# Patient Record
Sex: FEMALE | Race: OTHER | HISPANIC OR LATINO
[De-identification: names, ages, dates, MRNs, and addresses within clinical notes are randomized per-mention and may not be internally consistent; named-entity substitution may affect disease eponyms.]

---

## 2017-01-03 ENCOUNTER — APPOINTMENT (OUTPATIENT)
Dept: OBGYN | Facility: CLINIC | Age: 27
End: 2017-01-03

## 2017-01-05 ENCOUNTER — FORM ENCOUNTER (OUTPATIENT)
Age: 27
End: 2017-01-05

## 2017-01-06 ENCOUNTER — APPOINTMENT (OUTPATIENT)
Dept: ULTRASOUND IMAGING | Facility: IMAGING CENTER | Age: 27
End: 2017-01-06

## 2017-01-06 ENCOUNTER — OUTPATIENT (OUTPATIENT)
Dept: OUTPATIENT SERVICES | Facility: HOSPITAL | Age: 27
LOS: 1 days | End: 2017-01-06
Payer: MEDICAID

## 2017-01-06 DIAGNOSIS — E04.1 NONTOXIC SINGLE THYROID NODULE: ICD-10-CM

## 2017-01-06 DIAGNOSIS — Z33.2 ENCOUNTER FOR ELECTIVE TERMINATION OF PREGNANCY: Chronic | ICD-10-CM

## 2017-01-06 DIAGNOSIS — Z98.89 OTHER SPECIFIED POSTPROCEDURAL STATES: Chronic | ICD-10-CM

## 2017-01-06 PROCEDURE — 76536 US EXAM OF HEAD AND NECK: CPT

## 2017-01-10 ENCOUNTER — APPOINTMENT (OUTPATIENT)
Dept: OBGYN | Facility: CLINIC | Age: 27
End: 2017-01-10

## 2017-01-10 ENCOUNTER — OUTPATIENT (OUTPATIENT)
Dept: OUTPATIENT SERVICES | Facility: HOSPITAL | Age: 27
LOS: 1 days | End: 2017-01-10
Payer: MEDICAID

## 2017-01-10 ENCOUNTER — RESULT CHARGE (OUTPATIENT)
Age: 27
End: 2017-01-10

## 2017-01-10 VITALS
HEIGHT: 67 IN | SYSTOLIC BLOOD PRESSURE: 120 MMHG | BODY MASS INDEX: 30.29 KG/M2 | WEIGHT: 193 LBS | DIASTOLIC BLOOD PRESSURE: 82 MMHG

## 2017-01-10 DIAGNOSIS — N39.0 URINARY TRACT INFECTION, SITE NOT SPECIFIED: ICD-10-CM

## 2017-01-10 DIAGNOSIS — N76.0 ACUTE VAGINITIS: ICD-10-CM

## 2017-01-10 DIAGNOSIS — Z98.89 OTHER SPECIFIED POSTPROCEDURAL STATES: Chronic | ICD-10-CM

## 2017-01-10 DIAGNOSIS — Z33.2 ENCOUNTER FOR ELECTIVE TERMINATION OF PREGNANCY: Chronic | ICD-10-CM

## 2017-01-10 LAB
HCG UR QL: NEGATIVE
QUALITY CONTROL: NORMAL

## 2017-01-10 PROCEDURE — G0463: CPT

## 2017-01-10 PROCEDURE — 81025 URINE PREGNANCY TEST: CPT

## 2017-01-11 DIAGNOSIS — R53.83 OTHER FATIGUE: ICD-10-CM

## 2017-02-28 LAB
CORTIS SERPL-MCNC: 9.5 UG/DL
T4 FREE SERPL-MCNC: 1 NG/DL
TSH SERPL-ACNC: 5.52 UIU/ML

## 2017-03-05 ENCOUNTER — EMERGENCY (EMERGENCY)
Facility: HOSPITAL | Age: 27
LOS: 1 days | Discharge: HOME | End: 2017-03-05
Attending: EMERGENCY MEDICINE | Admitting: EMERGENCY MEDICINE
Payer: MEDICAID

## 2017-03-05 VITALS
OXYGEN SATURATION: 98 % | SYSTOLIC BLOOD PRESSURE: 127 MMHG | HEART RATE: 85 BPM | DIASTOLIC BLOOD PRESSURE: 78 MMHG | RESPIRATION RATE: 17 BRPM | TEMPERATURE: 98 F

## 2017-03-05 VITALS
RESPIRATION RATE: 17 BRPM | HEART RATE: 106 BPM | SYSTOLIC BLOOD PRESSURE: 136 MMHG | TEMPERATURE: 98 F | DIASTOLIC BLOOD PRESSURE: 83 MMHG | OXYGEN SATURATION: 98 %

## 2017-03-05 DIAGNOSIS — Z98.89 OTHER SPECIFIED POSTPROCEDURAL STATES: Chronic | ICD-10-CM

## 2017-03-05 DIAGNOSIS — Z33.2 ENCOUNTER FOR ELECTIVE TERMINATION OF PREGNANCY: Chronic | ICD-10-CM

## 2017-03-05 LAB
ALBUMIN SERPL ELPH-MCNC: 4.3 G/DL — SIGNIFICANT CHANGE UP (ref 3.3–5)
ALP SERPL-CCNC: 81 U/L — SIGNIFICANT CHANGE UP (ref 40–120)
ALT FLD-CCNC: 14 U/L RC — SIGNIFICANT CHANGE UP (ref 10–45)
ANION GAP SERPL CALC-SCNC: 11 MMOL/L — SIGNIFICANT CHANGE UP (ref 5–17)
APTT BLD: 28.4 SEC — SIGNIFICANT CHANGE UP (ref 27.5–37.4)
AST SERPL-CCNC: 18 U/L — SIGNIFICANT CHANGE UP (ref 10–40)
BASOPHILS # BLD AUTO: 0 K/UL — SIGNIFICANT CHANGE UP (ref 0–0.2)
BILIRUB SERPL-MCNC: 0.5 MG/DL — SIGNIFICANT CHANGE UP (ref 0.2–1.2)
BUN SERPL-MCNC: 15 MG/DL — SIGNIFICANT CHANGE UP (ref 7–23)
CALCIUM SERPL-MCNC: 8.6 MG/DL — SIGNIFICANT CHANGE UP (ref 8.4–10.5)
CHLORIDE SERPL-SCNC: 104 MMOL/L — SIGNIFICANT CHANGE UP (ref 96–108)
CO2 SERPL-SCNC: 23 MMOL/L — SIGNIFICANT CHANGE UP (ref 22–31)
CREAT SERPL-MCNC: 0.56 MG/DL — SIGNIFICANT CHANGE UP (ref 0.5–1.3)
EOSINOPHIL # BLD AUTO: 0.1 K/UL — SIGNIFICANT CHANGE UP (ref 0–0.5)
EOSINOPHIL NFR BLD AUTO: 2 % — SIGNIFICANT CHANGE UP (ref 0–6)
GLUCOSE SERPL-MCNC: 105 MG/DL — HIGH (ref 70–99)
HCT VFR BLD CALC: 38.1 % — SIGNIFICANT CHANGE UP (ref 34.5–45)
HGB BLD-MCNC: 12.8 G/DL — SIGNIFICANT CHANGE UP (ref 11.5–15.5)
INR BLD: 1.17 RATIO — HIGH (ref 0.88–1.16)
LYMPHOCYTES # BLD AUTO: 1.3 K/UL — SIGNIFICANT CHANGE UP (ref 1–3.3)
LYMPHOCYTES # BLD AUTO: 38 % — SIGNIFICANT CHANGE UP (ref 13–44)
MCHC RBC-ENTMCNC: 29.2 PG — SIGNIFICANT CHANGE UP (ref 27–34)
MCHC RBC-ENTMCNC: 33.5 GM/DL — SIGNIFICANT CHANGE UP (ref 32–36)
MCV RBC AUTO: 87.3 FL — SIGNIFICANT CHANGE UP (ref 80–100)
MONOCYTES # BLD AUTO: 0.4 K/UL — SIGNIFICANT CHANGE UP (ref 0–0.9)
MONOCYTES NFR BLD AUTO: 5 % — SIGNIFICANT CHANGE UP (ref 2–14)
NEUTROPHILS # BLD AUTO: 1.9 K/UL — SIGNIFICANT CHANGE UP (ref 1.8–7.4)
NEUTROPHILS NFR BLD AUTO: 44 % — SIGNIFICANT CHANGE UP (ref 43–77)
PLATELET # BLD AUTO: 301 K/UL — SIGNIFICANT CHANGE UP (ref 150–400)
POTASSIUM SERPL-MCNC: 4.1 MMOL/L — SIGNIFICANT CHANGE UP (ref 3.5–5.3)
POTASSIUM SERPL-SCNC: 4.1 MMOL/L — SIGNIFICANT CHANGE UP (ref 3.5–5.3)
PROT SERPL-MCNC: 7.9 G/DL — SIGNIFICANT CHANGE UP (ref 6–8.3)
PROTHROM AB SERPL-ACNC: 12.7 SEC — SIGNIFICANT CHANGE UP (ref 10–13.1)
RBC # BLD: 4.37 M/UL — SIGNIFICANT CHANGE UP (ref 3.8–5.2)
RBC # FLD: 14.1 % — SIGNIFICANT CHANGE UP (ref 10.3–14.5)
SODIUM SERPL-SCNC: 138 MMOL/L — SIGNIFICANT CHANGE UP (ref 135–145)
WBC # BLD: 3.5 K/UL — LOW (ref 3.8–10.5)
WBC # FLD AUTO: 3.5 K/UL — LOW (ref 3.8–10.5)

## 2017-03-05 PROCEDURE — 96374 THER/PROPH/DIAG INJ IV PUSH: CPT

## 2017-03-05 PROCEDURE — 80053 COMPREHEN METABOLIC PANEL: CPT

## 2017-03-05 PROCEDURE — 73110 X-RAY EXAM OF WRIST: CPT | Mod: 26,50

## 2017-03-05 PROCEDURE — 99284 EMERGENCY DEPT VISIT MOD MDM: CPT

## 2017-03-05 PROCEDURE — 85610 PROTHROMBIN TIME: CPT

## 2017-03-05 PROCEDURE — 73610 X-RAY EXAM OF ANKLE: CPT | Mod: 26,50

## 2017-03-05 PROCEDURE — 86140 C-REACTIVE PROTEIN: CPT

## 2017-03-05 PROCEDURE — 73610 X-RAY EXAM OF ANKLE: CPT

## 2017-03-05 PROCEDURE — 73110 X-RAY EXAM OF WRIST: CPT

## 2017-03-05 PROCEDURE — 73120 X-RAY EXAM OF HAND: CPT

## 2017-03-05 PROCEDURE — 85730 THROMBOPLASTIN TIME PARTIAL: CPT

## 2017-03-05 PROCEDURE — 85652 RBC SED RATE AUTOMATED: CPT

## 2017-03-05 PROCEDURE — 73120 X-RAY EXAM OF HAND: CPT | Mod: 26,50

## 2017-03-05 PROCEDURE — 85027 COMPLETE CBC AUTOMATED: CPT

## 2017-03-05 PROCEDURE — 99284 EMERGENCY DEPT VISIT MOD MDM: CPT | Mod: 25

## 2017-03-05 RX ADMIN — Medication 125 MILLIGRAM(S): at 12:04

## 2017-03-05 NOTE — ED PROVIDER NOTE - OBJECTIVE STATEMENT
27 year old, with pmh emphysema, seronegative arthitis, presenting with swollen lymph nodes, swollen right hand worst in her last 3 digits compromising her ability to  and hold things. new bruising on her thighs, swollen feet. flares similar have happened since age 19, usually gets prednisone 60mg and then tapers. currently not on steroids. symptoms refractory to ibu and tylenol. Has tried methotrexate 1.5 years ago but she developed fatty liver disease. this is her worst "flare" in 1.5 years ago. diagnosed with thyroid nodules 3 months ago with no acute interventions yet.     PMD: kaykay bustamante  Rheum: in between doctors  endocrinolgist: gayla orozco 27 year old, with pmh emphysema, seronegative arthitis, presenting with swollen lymph nodes, swollen right hand worst in her last 3 digits compromising her ability to  and hold things. new bruising on her thighs, swollen feet. flares similar have happened since age 19, usually gets prednisone 60mg and then tapers. currently not on steroids. symptoms refractory to ibu and tylenol. Has tried methotrexate 1.5 years ago but she developed fatty liver disease. this is her worst "flare" in 1.5 years ago. diagnosed with thyroid nodules 3 months ago with no acute interventions yet.   denies recent travel or fevers.   PMD: kaykay bustamante  Rheum: in between doctors  endocrinolgist: gayla orozco

## 2017-03-05 NOTE — ED PROVIDER NOTE - ATTENDING CONTRIBUTION TO CARE
Patient with chronic arthritis of unclear etiology (patient reporting that workups have not been diagnostic to date) presenting with exacerbation of her chronic arthritis - having problems moving or using her hands.  Has not seen her rheumatologist recently and is getting frustrated that her symptoms are only getting worse without a known cause.  Does report that her symptoms have responded to steroids in the past but currently not on them.    On exam patient VS WNL, well appearing. RRR S1/S2, CTABL, abdomen soft.  Diffuse polyarthritis noted without erythema/warmth noted.  Small old bruising on inner thighs - patient reporting this is new.    Suspect exacerbation of chronic arthritis of unclear cause.  Brusing on thighs appears more suggestive of a possible vasculitis - unclear if this is associated with the underlying arthritis/rheumatologic disease.  Will check basic labs for platelet/clotting deficiency at this time, will also treat with steroids in ED for symptom management.  Had lengthy discussion with patient - will refer to rheumatology and pain management for re-evaluate and second opinion of her chronic issue as ED workup unlikely to be diagnostic at this time - she agreed with plan.

## 2017-03-05 NOTE — ED PROVIDER NOTE - CARE PLAN
Principal Discharge DX:	Joint swelling  Instructions for follow-up, activity and diet:	take 20mg Prednisone for 2 weeks  You may take 600mg of ibuprofen (example: motrin or advil) every 4-6 hours for baseline pain control as indicated with respect to the warnings on the label. This is an over the counter medication.   Follow up with [facility] Rheumatology clinic within 1 week   You must return for new, worsening or concerning symptom; specifically including those listed on the attached sheet. Principal Discharge DX:	Joint swelling  Instructions for follow-up, activity and diet:	take 20mg Prednisone for 2 weeks  You may take 600mg of ibuprofen (example: motrin or advil) every 4-6 hours for baseline pain control as indicated with respect to the warnings on the label. This is an over the counter medication.   Follow up with [facility] Rheumatology clinic within 1 week   You must return for new, worsening or concerning symptom; specifically including those listed on the attached sheet.

## 2017-03-05 NOTE — ED PROVIDER NOTE - PMH
Anemia    Asthma  No h/o intubation, dose not use ventolin on daily basis, denies hospitalization due to asthma  Chronic tonsillitis    Emphysema lung    Enlarged lymph nodes  dx: 2014  History of Clostridium difficile infection  2016, not an active infection  Migraine    Multigravida    Seronegative arthritis    Vitamin D deficiency

## 2017-03-05 NOTE — ED PROVIDER NOTE - NS ED ROS FT
ROS: No fever/chills, no eye pain, left neck lymph node swelling but no throat pain, no chest pain, no shortness of breath, no abdominal pain,  no dysuria, no muscle pain, no rashes, no focal neurologic complaints, no known mental health issues.

## 2017-03-05 NOTE — ED PROVIDER NOTE - PLAN OF CARE
take 20mg Prednisone for 2 weeks  You may take 600mg of ibuprofen (example: motrin or advil) every 4-6 hours for baseline pain control as indicated with respect to the warnings on the label. This is an over the counter medication.   Follow up with [facility] Rheumatology clinic within 1 week   You must return for new, worsening or concerning symptom; specifically including those listed on the attached sheet.

## 2017-03-05 NOTE — ED PROVIDER NOTE - PROGRESS NOTE DETAILS
Evan PGY2: discussed case with Dr. Montana from Rheum, patients records reviewed over the phone. plan for prednisone and follow up with xrays here in ER.

## 2017-03-05 NOTE — ED PROVIDER NOTE - PHYSICAL EXAMINATION
Evan: A & O x 3, NAD, HEENT with neck asymmetry consistent with known thyroid nodule and no facial asymmetry; lungs CTAB, heart with reg rhythm without murmur; abdomen soft NTND; extremities with right proximal phalynx swelling in right hand 3, 4, 5 digits, left 2nd digit proximal phalynx swelling in left hand, left lateral malleolus swelling in left leg; skin with nonspecific ecchymosis in thighs, neuro exam non focal with no motor or sensory deficits

## 2017-03-05 NOTE — ED ADULT NURSE NOTE - OBJECTIVE STATEMENT
26 yo female with PMHx significant for emphysema, asthma, thyroid nodules, and "lupus-like syndrome" with joint swelling presents ambulatory to ED c/o swollen neck and axillary lymph nodes, swollen right hand worst in her last 3 digits compromising her ability to  and hold things, new bruising on her thighs, and swollen feet. Patient states that she has had intermittent "flares" of similar symptoms since aage 19 but has never experienced easy bruising or hand swelling like she is now. Not currently on steroids. No chest pain or shortness of breath at present. Lungs are clear. Abdomen is soft, nondistended, nontender to palpation. Patient moving all extremities and reports bilateral foot pain with walking.

## 2017-03-06 NOTE — ED POST DISCHARGE NOTE - REASON FOR FOLLOW-UP
Other crp elevated 0.89. patient's telephone number is 'not a working number.' left message on cell phone of patient's mother to speak with Aisha. patient will need to give these results to the rheumatologist when she follows up at the clinic this week. -Komal Adamson PA-C

## 2017-03-07 ENCOUNTER — MEDICATION RENEWAL (OUTPATIENT)
Age: 27
End: 2017-03-07

## 2017-03-09 DIAGNOSIS — M79.89 OTHER SPECIFIED SOFT TISSUE DISORDERS: ICD-10-CM

## 2017-03-10 ENCOUNTER — APPOINTMENT (OUTPATIENT)
Dept: RHEUMATOLOGY | Facility: CLINIC | Age: 27
End: 2017-03-10

## 2017-03-10 ENCOUNTER — LABORATORY RESULT (OUTPATIENT)
Age: 27
End: 2017-03-10

## 2017-03-10 ENCOUNTER — FORM ENCOUNTER (OUTPATIENT)
Age: 27
End: 2017-03-10

## 2017-03-10 VITALS
HEART RATE: 83 BPM | DIASTOLIC BLOOD PRESSURE: 83 MMHG | TEMPERATURE: 97.5 F | SYSTOLIC BLOOD PRESSURE: 123 MMHG | OXYGEN SATURATION: 98 % | RESPIRATION RATE: 14 BRPM

## 2017-03-10 DIAGNOSIS — M12.9 ARTHROPATHY, UNSPECIFIED: ICD-10-CM

## 2017-03-10 DIAGNOSIS — Z78.9 OTHER SPECIFIED HEALTH STATUS: ICD-10-CM

## 2017-03-10 DIAGNOSIS — M85.80 OTHER SPECIFIED DISORDERS OF BONE DENSITY AND STRUCTURE, UNSPECIFIED SITE: ICD-10-CM

## 2017-03-10 RX ORDER — PREDNISONE 10 MG/1
10 TABLET ORAL DAILY
Qty: 90 | Refills: 1 | Status: DISCONTINUED | COMMUNITY
Start: 2017-03-10 | End: 2017-03-10

## 2017-03-11 ENCOUNTER — OUTPATIENT (OUTPATIENT)
Dept: OUTPATIENT SERVICES | Facility: HOSPITAL | Age: 27
LOS: 1 days | End: 2017-03-11
Payer: MEDICAID

## 2017-03-11 ENCOUNTER — APPOINTMENT (OUTPATIENT)
Dept: RADIOLOGY | Facility: CLINIC | Age: 27
End: 2017-03-11

## 2017-03-11 DIAGNOSIS — Z33.2 ENCOUNTER FOR ELECTIVE TERMINATION OF PREGNANCY: Chronic | ICD-10-CM

## 2017-03-11 DIAGNOSIS — Z98.89 OTHER SPECIFIED POSTPROCEDURAL STATES: Chronic | ICD-10-CM

## 2017-03-11 DIAGNOSIS — M85.80 OTHER SPECIFIED DISORDERS OF BONE DENSITY AND STRUCTURE, UNSPECIFIED SITE: ICD-10-CM

## 2017-03-11 LAB
APPEARANCE: CLEAR
BILIRUBIN URINE: NEGATIVE
BLOOD URINE: NEGATIVE
C3 SERPL-MCNC: 128 MG/DL
C4 SERPL-MCNC: 16 MG/DL
CK SERPL-CCNC: 37 U/L
COLOR: YELLOW
CREAT SPEC-SCNC: 27 MG/DL
CREAT/PROT UR: <0.1 RATIO
ENA RNP AB SER IA-ACNC: <0.2 AL
ENA SCL70 IGG SER IA-ACNC: <0.2 AL
ENA SM AB SER IA-ACNC: <0.2 AL
ENA SS-A AB SER IA-ACNC: <0.2 AL
ENA SS-B AB SER IA-ACNC: <0.2 AL
GLUCOSE QUALITATIVE U: NORMAL MG/DL
HCYS SERPL-MCNC: 8.3 UMOL/L
KETONES URINE: NEGATIVE
LEUKOCYTE ESTERASE URINE: ABNORMAL
NITRITE URINE: NEGATIVE
PH URINE: 6.5
PROT UR-MCNC: <4 MG/DL
PROT UR-MCNC: <4 MG/DL
PROTEIN URINE: NEGATIVE MG/DL
RHEUMATOID FACT SER QL: 10.7 IU/ML
SPECIFIC GRAVITY URINE: 1.01
UROBILINOGEN URINE: NORMAL MG/DL

## 2017-03-11 PROCEDURE — 77080 DXA BONE DENSITY AXIAL: CPT

## 2017-03-13 ENCOUNTER — LABORATORY RESULT (OUTPATIENT)
Age: 27
End: 2017-03-13

## 2017-03-13 LAB
ANA PAT FLD IF-IMP: ABNORMAL
ANA SER IF-ACNC: ABNORMAL
CCP AB SER IA-ACNC: <8 UNITS
DSDNA AB SER-ACNC: <12 IU/ML
ENDOMYSIUM IGA SER QL: NORMAL
ENDOMYSIUM IGA TITR SER: NORMAL
GLIADIN IGA SER QL: 5.9 UNITS
GLIADIN IGG SER QL: <5 UNITS
GLIADIN PEPTIDE IGA SER-ACNC: NEGATIVE
GLIADIN PEPTIDE IGG SER-ACNC: NEGATIVE
PROT C PPP CHRO-ACNC: 115 %
RF+CCP IGG SER-IMP: NEGATIVE

## 2017-03-15 ENCOUNTER — RX RENEWAL (OUTPATIENT)
Age: 27
End: 2017-03-15

## 2017-03-15 PROBLEM — M85.80 OSTEOPENIA: Status: ACTIVE | Noted: 2017-03-10

## 2017-03-15 LAB
B19V IGG SER QL IA: 6.3 INDEX
B19V IGG+IGM SER-IMP: NORMAL
B19V IGG+IGM SER-IMP: POSITIVE
B19V IGM FLD-ACNC: 0.1 INDEX
B19V IGM SER-ACNC: NEGATIVE
B2 GLYCOPROT1 IGA SERPL IA-ACNC: <5 SAU
B2 GLYCOPROT1 IGG SER-ACNC: <5 SGU
B2 GLYCOPROT1 IGM SER-ACNC: <5 SMU
CARDIOLIPIN IGM SER-MCNC: 5.5 MPL
CARDIOLIPIN IGM SER-MCNC: 5.7 GPL
CONFIRM: 26.7 SEC
DEPRECATED CARDIOLIPIN IGA SER: <5 APL
DRVVT IMM 1:2 NP PPP: NORMAL
DRVVT SCREEN TO CONFIRM RATIO: 0.97 RATIO
SCREEN DRVVT: 29 SEC
TTG IGA SER IA-ACNC: <5 UNITS
TTG IGA SER-ACNC: NEGATIVE
TTG IGG SER IA-ACNC: 5.8 UNITS
TTG IGG SER IA-ACNC: NEGATIVE

## 2017-03-15 RX ORDER — METHYLPREDNISOLONE 8 MG/1
8 TABLET ORAL
Qty: 90 | Refills: 0 | Status: DISCONTINUED | COMMUNITY
Start: 2017-03-10 | End: 2017-03-15

## 2017-03-16 LAB
DNA PLOIDY SPEC FC-IMP: NORMAL
PTR INTERP: NORMAL

## 2017-03-23 ENCOUNTER — APPOINTMENT (OUTPATIENT)
Dept: RHEUMATOLOGY | Facility: CLINIC | Age: 27
End: 2017-03-23

## 2017-03-23 VITALS
DIASTOLIC BLOOD PRESSURE: 76 MMHG | HEIGHT: 67 IN | OXYGEN SATURATION: 98 % | HEART RATE: 84 BPM | BODY MASS INDEX: 29.98 KG/M2 | WEIGHT: 191 LBS | SYSTOLIC BLOOD PRESSURE: 110 MMHG

## 2017-03-29 ENCOUNTER — RX RENEWAL (OUTPATIENT)
Age: 27
End: 2017-03-29

## 2017-03-30 ENCOUNTER — APPOINTMENT (OUTPATIENT)
Dept: RHEUMATOLOGY | Facility: CLINIC | Age: 27
End: 2017-03-30

## 2017-03-30 VITALS
DIASTOLIC BLOOD PRESSURE: 76 MMHG | HEIGHT: 67 IN | BODY MASS INDEX: 30.76 KG/M2 | WEIGHT: 196 LBS | SYSTOLIC BLOOD PRESSURE: 109 MMHG | OXYGEN SATURATION: 97 % | HEART RATE: 96 BPM

## 2017-03-30 RX ORDER — METHOTREXATE 25 MG/ML
50 INJECTION INTRA-ARTERIAL; INTRAMUSCULAR; INTRATHECAL; INTRAVENOUS
Refills: 0 | Status: COMPLETED | OUTPATIENT
Start: 2017-03-30

## 2017-04-04 ENCOUNTER — EMERGENCY (EMERGENCY)
Facility: HOSPITAL | Age: 27
LOS: 0 days | Discharge: HOME | End: 2017-04-05
Attending: EMERGENCY MEDICINE | Admitting: EMERGENCY MEDICINE
Payer: MEDICAID

## 2017-04-04 VITALS
SYSTOLIC BLOOD PRESSURE: 110 MMHG | WEIGHT: 190.04 LBS | HEART RATE: 92 BPM | DIASTOLIC BLOOD PRESSURE: 66 MMHG | RESPIRATION RATE: 18 BRPM | TEMPERATURE: 98 F | OXYGEN SATURATION: 100 %

## 2017-04-04 DIAGNOSIS — Z33.2 ENCOUNTER FOR ELECTIVE TERMINATION OF PREGNANCY: Chronic | ICD-10-CM

## 2017-04-04 DIAGNOSIS — Z98.89 OTHER SPECIFIED POSTPROCEDURAL STATES: Chronic | ICD-10-CM

## 2017-04-04 PROCEDURE — 99284 EMERGENCY DEPT VISIT MOD MDM: CPT

## 2017-04-04 NOTE — ED ADULT NURSE NOTE - CHPI ED SYMPTOMS NEG
no weakness/no confusion/no blurred vision/no change in level of consciousness/no numbness/no loss of consciousness/no fever/no dizziness

## 2017-04-04 NOTE — ED ADULT NURSE NOTE - OBJECTIVE STATEMENT
Patient presents to ED for eval of headache x 12 hours. Patient reports hx of migraines, but reports this one "feels different." Reports nausea, light-sensitivity. Denies injury or trauma. A&Ox3. No acute distress noted at this time.

## 2017-04-05 VITALS
HEART RATE: 80 BPM | RESPIRATION RATE: 16 BRPM | OXYGEN SATURATION: 99 % | SYSTOLIC BLOOD PRESSURE: 116 MMHG | DIASTOLIC BLOOD PRESSURE: 72 MMHG | TEMPERATURE: 98 F

## 2017-04-05 LAB
ALBUMIN SERPL ELPH-MCNC: 3.3 G/DL — SIGNIFICANT CHANGE UP (ref 3.3–5)
ALP SERPL-CCNC: 57 U/L — SIGNIFICANT CHANGE UP (ref 40–120)
ALT FLD-CCNC: 15 U/L — SIGNIFICANT CHANGE UP (ref 12–78)
ANION GAP SERPL CALC-SCNC: 7 MMOL/L — SIGNIFICANT CHANGE UP (ref 5–17)
AST SERPL-CCNC: 10 U/L — LOW (ref 15–37)
BASOPHILS # BLD AUTO: 0.1 K/UL — SIGNIFICANT CHANGE UP (ref 0–0.2)
BASOPHILS NFR BLD AUTO: 1.2 % — SIGNIFICANT CHANGE UP (ref 0–2)
BILIRUB SERPL-MCNC: 0.3 MG/DL — SIGNIFICANT CHANGE UP (ref 0.2–1.2)
BUN SERPL-MCNC: 16 MG/DL — SIGNIFICANT CHANGE UP (ref 7–23)
CALCIUM SERPL-MCNC: 8.6 MG/DL — SIGNIFICANT CHANGE UP (ref 8.5–10.1)
CHLORIDE SERPL-SCNC: 106 MMOL/L — SIGNIFICANT CHANGE UP (ref 96–108)
CO2 SERPL-SCNC: 30 MMOL/L — SIGNIFICANT CHANGE UP (ref 22–31)
CREAT SERPL-MCNC: 0.56 MG/DL — SIGNIFICANT CHANGE UP (ref 0.5–1.3)
EOSINOPHIL # BLD AUTO: 0.1 K/UL — SIGNIFICANT CHANGE UP (ref 0–0.5)
EOSINOPHIL NFR BLD AUTO: 0.9 % — SIGNIFICANT CHANGE UP (ref 0–6)
GLUCOSE SERPL-MCNC: 101 MG/DL — HIGH (ref 70–99)
HCT VFR BLD CALC: 40.1 % — SIGNIFICANT CHANGE UP (ref 34.5–45)
HGB BLD-MCNC: 13.5 G/DL — SIGNIFICANT CHANGE UP (ref 11.5–15.5)
LYMPHOCYTES # BLD AUTO: 2.5 K/UL — SIGNIFICANT CHANGE UP (ref 1–3.3)
LYMPHOCYTES # BLD AUTO: 37.7 % — SIGNIFICANT CHANGE UP (ref 13–44)
MCHC RBC-ENTMCNC: 29.7 PG — SIGNIFICANT CHANGE UP (ref 27–34)
MCHC RBC-ENTMCNC: 33.6 GM/DL — SIGNIFICANT CHANGE UP (ref 32–36)
MCV RBC AUTO: 88.5 FL — SIGNIFICANT CHANGE UP (ref 80–100)
MONOCYTES # BLD AUTO: 0.5 K/UL — SIGNIFICANT CHANGE UP (ref 0–0.9)
MONOCYTES NFR BLD AUTO: 7.2 % — SIGNIFICANT CHANGE UP (ref 2–14)
NEUTROPHILS # BLD AUTO: 3.6 K/UL — SIGNIFICANT CHANGE UP (ref 1.8–7.4)
NEUTROPHILS NFR BLD AUTO: 53.1 % — SIGNIFICANT CHANGE UP (ref 43–77)
PLATELET # BLD AUTO: 348 K/UL — SIGNIFICANT CHANGE UP (ref 150–400)
POTASSIUM SERPL-MCNC: 3.8 MMOL/L — SIGNIFICANT CHANGE UP (ref 3.5–5.3)
POTASSIUM SERPL-SCNC: 3.8 MMOL/L — SIGNIFICANT CHANGE UP (ref 3.5–5.3)
PROT SERPL-MCNC: 7 GM/DL — SIGNIFICANT CHANGE UP (ref 6–8.3)
RBC # BLD: 4.53 M/UL — SIGNIFICANT CHANGE UP (ref 3.8–5.2)
RBC # FLD: 15.5 % — HIGH (ref 10.3–14.5)
SODIUM SERPL-SCNC: 143 MMOL/L — SIGNIFICANT CHANGE UP (ref 135–145)
WBC # BLD: 6.7 K/UL — SIGNIFICANT CHANGE UP (ref 3.8–10.5)
WBC # FLD AUTO: 6.7 K/UL — SIGNIFICANT CHANGE UP (ref 3.8–10.5)

## 2017-04-05 RX ORDER — DIPHENHYDRAMINE HCL 50 MG
25 CAPSULE ORAL ONCE
Qty: 0 | Refills: 0 | Status: COMPLETED | OUTPATIENT
Start: 2017-04-05 | End: 2017-04-05

## 2017-04-05 RX ORDER — MAGNESIUM SULFATE 500 MG/ML
1 VIAL (ML) INJECTION ONCE
Qty: 0 | Refills: 0 | Status: COMPLETED | OUTPATIENT
Start: 2017-04-05 | End: 2017-04-05

## 2017-04-05 RX ORDER — SODIUM CHLORIDE 9 MG/ML
1000 INJECTION INTRAMUSCULAR; INTRAVENOUS; SUBCUTANEOUS ONCE
Qty: 0 | Refills: 0 | Status: COMPLETED | OUTPATIENT
Start: 2017-04-05 | End: 2017-04-05

## 2017-04-05 RX ORDER — KETOROLAC TROMETHAMINE 30 MG/ML
30 SYRINGE (ML) INJECTION ONCE
Qty: 0 | Refills: 0 | Status: DISCONTINUED | OUTPATIENT
Start: 2017-04-05 | End: 2017-04-05

## 2017-04-05 RX ORDER — PROCHLORPERAZINE MALEATE 5 MG
10 TABLET ORAL ONCE
Qty: 0 | Refills: 0 | Status: COMPLETED | OUTPATIENT
Start: 2017-04-05 | End: 2017-04-05

## 2017-04-05 RX ADMIN — Medication 30 MILLIGRAM(S): at 01:29

## 2017-04-05 RX ADMIN — SODIUM CHLORIDE 1000 MILLILITER(S): 9 INJECTION INTRAMUSCULAR; INTRAVENOUS; SUBCUTANEOUS at 01:29

## 2017-04-05 RX ADMIN — Medication 200 GRAM(S): at 02:46

## 2017-04-05 RX ADMIN — Medication 25 MILLIGRAM(S): at 01:29

## 2017-04-05 RX ADMIN — Medication 30 MILLIGRAM(S): at 02:46

## 2017-04-05 RX ADMIN — Medication 10 MILLIGRAM(S): at 01:49

## 2017-04-05 NOTE — ED PROVIDER NOTE - MEDICAL DECISION MAKING DETAILS
Pt much more comfortable, NAD wo any HA at this time.  Improved with fluids and medication.  Stable for DC to follow up with PMD.  Pt agrees with plan

## 2017-04-05 NOTE — ED PROVIDER NOTE - OBJECTIVE STATEMENT
27 F with a hx of migraine ha's presents with co the same.  Sx started at about 1300 and pt has had multiple episodes of vomiting.  No co dizziness, blurry vision, cp, sob, abd pain, neck pain, fevers, diarrhea.  No extremity weakness.

## 2017-04-05 NOTE — ED PROVIDER NOTE - ENMT, MLM
Airway patent, Nasal mucosa clear. Mouth with normal mucosa. Throat has no vesicles, no oropharyngeal exudates and uvula is midline.  Neg kurnigs , Neg brudzinski's. No neck tenderness

## 2017-04-06 DIAGNOSIS — G43.909 MIGRAINE, UNSPECIFIED, NOT INTRACTABLE, WITHOUT STATUS MIGRAINOSUS: ICD-10-CM

## 2017-04-06 DIAGNOSIS — M54.9 DORSALGIA, UNSPECIFIED: ICD-10-CM

## 2017-04-06 DIAGNOSIS — R11.10 VOMITING, UNSPECIFIED: ICD-10-CM

## 2017-04-06 DIAGNOSIS — R51 HEADACHE: ICD-10-CM

## 2017-04-07 ENCOUNTER — APPOINTMENT (OUTPATIENT)
Dept: RHEUMATOLOGY | Facility: CLINIC | Age: 27
End: 2017-04-07

## 2017-04-13 ENCOUNTER — APPOINTMENT (OUTPATIENT)
Dept: RHEUMATOLOGY | Facility: CLINIC | Age: 27
End: 2017-04-13

## 2017-04-13 VITALS
HEART RATE: 76 BPM | BODY MASS INDEX: 31.71 KG/M2 | WEIGHT: 202 LBS | HEIGHT: 67 IN | OXYGEN SATURATION: 96 % | SYSTOLIC BLOOD PRESSURE: 123 MMHG | DIASTOLIC BLOOD PRESSURE: 78 MMHG

## 2017-04-14 LAB
25(OH)D3 SERPL-MCNC: 23.5 NG/ML
ALBUMIN SERPL ELPH-MCNC: 4 G/DL
ALP BLD-CCNC: 62 U/L
ALT SERPL-CCNC: 12 U/L
ANION GAP SERPL CALC-SCNC: 14 MMOL/L
APPEARANCE: CLEAR
AST SERPL-CCNC: 8 U/L
BASOPHILS # BLD AUTO: 0.03 K/UL
BASOPHILS NFR BLD AUTO: 0.3 %
BILIRUB SERPL-MCNC: 0.2 MG/DL
BILIRUBIN URINE: NEGATIVE
BLOOD URINE: NEGATIVE
BUN SERPL-MCNC: 14 MG/DL
CALCIUM SERPL-MCNC: 9.3 MG/DL
CHLORIDE SERPL-SCNC: 101 MMOL/L
CO2 SERPL-SCNC: 26 MMOL/L
COLOR: YELLOW
CREAT SERPL-MCNC: 0.54 MG/DL
CRP SERPL-MCNC: 0.58 MG/DL
EOSINOPHIL # BLD AUTO: 0.07 K/UL
EOSINOPHIL NFR BLD AUTO: 0.8 %
ERYTHROCYTE [SEDIMENTATION RATE] IN BLOOD BY WESTERGREN METHOD: 16 MM/HR
GLUCOSE QUALITATIVE U: NORMAL MG/DL
GLUCOSE SERPL-MCNC: 64 MG/DL
HBA1C MFR BLD HPLC: 5.9 %
HCT VFR BLD CALC: 38.8 %
HGB BLD-MCNC: 12.6 G/DL
IMM GRANULOCYTES NFR BLD AUTO: 0.2 %
KETONES URINE: NEGATIVE
LEUKOCYTE ESTERASE URINE: NEGATIVE
LYMPHOCYTES # BLD AUTO: 2.87 K/UL
LYMPHOCYTES NFR BLD AUTO: 32.6 %
MAN DIFF?: NORMAL
MCHC RBC-ENTMCNC: 29.9 PG
MCHC RBC-ENTMCNC: 32.5 GM/DL
MCV RBC AUTO: 91.9 FL
MONOCYTES # BLD AUTO: 0.56 K/UL
MONOCYTES NFR BLD AUTO: 6.4 %
NEUTROPHILS # BLD AUTO: 5.25 K/UL
NEUTROPHILS NFR BLD AUTO: 59.7 %
NITRITE URINE: NEGATIVE
PH URINE: 6
PLATELET # BLD AUTO: 390 K/UL
POTASSIUM SERPL-SCNC: 3.6 MMOL/L
PROT SERPL-MCNC: 6.8 G/DL
PROTEIN URINE: NEGATIVE MG/DL
RBC # BLD: 4.22 M/UL
RBC # FLD: 19.2 %
SODIUM SERPL-SCNC: 141 MMOL/L
SPECIFIC GRAVITY URINE: 1.02
UROBILINOGEN URINE: NORMAL MG/DL
WBC # FLD AUTO: 8.8 K/UL

## 2017-04-17 ENCOUNTER — OTHER (OUTPATIENT)
Age: 27
End: 2017-04-17

## 2017-04-17 ENCOUNTER — TRANSCRIPTION ENCOUNTER (OUTPATIENT)
Age: 27
End: 2017-04-17

## 2017-04-18 ENCOUNTER — RX RENEWAL (OUTPATIENT)
Age: 27
End: 2017-04-18

## 2017-04-20 ENCOUNTER — APPOINTMENT (OUTPATIENT)
Dept: RHEUMATOLOGY | Facility: CLINIC | Age: 27
End: 2017-04-20

## 2017-04-20 VITALS
SYSTOLIC BLOOD PRESSURE: 111 MMHG | OXYGEN SATURATION: 98 % | BODY MASS INDEX: 31.71 KG/M2 | DIASTOLIC BLOOD PRESSURE: 77 MMHG | HEART RATE: 106 BPM | WEIGHT: 202 LBS | HEIGHT: 67 IN

## 2017-04-20 RX ORDER — AZITHROMYCIN 250 MG/1
250 TABLET, FILM COATED ORAL
Qty: 6 | Refills: 0 | Status: DISCONTINUED | COMMUNITY
Start: 2017-03-23 | End: 2017-04-20

## 2017-04-20 RX ORDER — PROMETHAZINE HYDROCHLORIDE 6.25 MG/5ML
6.25 SOLUTION ORAL
Qty: 1 | Refills: 0 | Status: DISCONTINUED | COMMUNITY
Start: 2017-03-29 | End: 2017-04-20

## 2017-04-21 ENCOUNTER — APPOINTMENT (OUTPATIENT)
Dept: ENDOCRINOLOGY | Facility: CLINIC | Age: 27
End: 2017-04-21

## 2017-04-21 VITALS
WEIGHT: 202 LBS | SYSTOLIC BLOOD PRESSURE: 110 MMHG | OXYGEN SATURATION: 98 % | HEART RATE: 80 BPM | DIASTOLIC BLOOD PRESSURE: 80 MMHG | BODY MASS INDEX: 31.71 KG/M2 | HEIGHT: 67 IN

## 2017-04-24 LAB
25(OH)D3 SERPL-MCNC: 31.4 NG/ML
T4 FREE SERPL-MCNC: 1.3 NG/DL
TSH SERPL-ACNC: 1.01 UIU/ML

## 2017-04-26 ENCOUNTER — APPOINTMENT (OUTPATIENT)
Dept: RHEUMATOLOGY | Facility: CLINIC | Age: 27
End: 2017-04-26

## 2017-04-26 ENCOUNTER — FORM ENCOUNTER (OUTPATIENT)
Age: 27
End: 2017-04-26

## 2017-04-26 VITALS
TEMPERATURE: 98.3 F | DIASTOLIC BLOOD PRESSURE: 81 MMHG | HEART RATE: 98 BPM | SYSTOLIC BLOOD PRESSURE: 121 MMHG | OXYGEN SATURATION: 97 %

## 2017-04-27 ENCOUNTER — OUTPATIENT (OUTPATIENT)
Dept: OUTPATIENT SERVICES | Facility: HOSPITAL | Age: 27
LOS: 1 days | End: 2017-04-27
Payer: MEDICAID

## 2017-04-27 ENCOUNTER — APPOINTMENT (OUTPATIENT)
Dept: RADIOLOGY | Facility: CLINIC | Age: 27
End: 2017-04-27

## 2017-04-27 ENCOUNTER — RX RENEWAL (OUTPATIENT)
Age: 27
End: 2017-04-27

## 2017-04-27 DIAGNOSIS — Z98.89 OTHER SPECIFIED POSTPROCEDURAL STATES: Chronic | ICD-10-CM

## 2017-04-27 DIAGNOSIS — Z00.8 ENCOUNTER FOR OTHER GENERAL EXAMINATION: ICD-10-CM

## 2017-04-27 DIAGNOSIS — Z33.2 ENCOUNTER FOR ELECTIVE TERMINATION OF PREGNANCY: Chronic | ICD-10-CM

## 2017-04-27 PROCEDURE — 72200 X-RAY EXAM SI JOINTS: CPT

## 2017-05-04 ENCOUNTER — APPOINTMENT (OUTPATIENT)
Dept: RHEUMATOLOGY | Facility: CLINIC | Age: 27
End: 2017-05-04

## 2017-05-04 VITALS
HEIGHT: 67 IN | WEIGHT: 207 LBS | SYSTOLIC BLOOD PRESSURE: 109 MMHG | BODY MASS INDEX: 32.49 KG/M2 | DIASTOLIC BLOOD PRESSURE: 75 MMHG | OXYGEN SATURATION: 99 % | HEART RATE: 81 BPM

## 2017-05-11 ENCOUNTER — APPOINTMENT (OUTPATIENT)
Dept: RHEUMATOLOGY | Facility: CLINIC | Age: 27
End: 2017-05-11

## 2017-05-11 VITALS
SYSTOLIC BLOOD PRESSURE: 123 MMHG | DIASTOLIC BLOOD PRESSURE: 73 MMHG | BODY MASS INDEX: 33.59 KG/M2 | WEIGHT: 214 LBS | HEIGHT: 67 IN | HEART RATE: 103 BPM | OXYGEN SATURATION: 98 %

## 2017-05-13 ENCOUNTER — RX RENEWAL (OUTPATIENT)
Age: 27
End: 2017-05-13

## 2017-05-16 LAB
25(OH)D3 SERPL-MCNC: 26.1 NG/ML
ALBUMIN SERPL ELPH-MCNC: 4.4 G/DL
ALP BLD-CCNC: 72 U/L
ALT SERPL-CCNC: 14 U/L
ANION GAP SERPL CALC-SCNC: 20 MMOL/L
APPEARANCE: CLEAR
AST SERPL-CCNC: 10 U/L
BASOPHILS # BLD AUTO: 0.01 K/UL
BASOPHILS NFR BLD AUTO: 0.1 %
BILIRUB SERPL-MCNC: 0.2 MG/DL
BILIRUBIN URINE: NEGATIVE
BLOOD URINE: NEGATIVE
BUN SERPL-MCNC: 17 MG/DL
CALCIUM SERPL-MCNC: 10.1 MG/DL
CHLORIDE SERPL-SCNC: 96 MMOL/L
CO2 SERPL-SCNC: 21 MMOL/L
COLOR: YELLOW
CREAT SERPL-MCNC: 0.71 MG/DL
CRP SERPL-MCNC: 0.4 MG/DL
EOSINOPHIL # BLD AUTO: 0 K/UL
EOSINOPHIL NFR BLD AUTO: 0 %
ERYTHROCYTE [SEDIMENTATION RATE] IN BLOOD BY WESTERGREN METHOD: 24 MM/HR
GLUCOSE QUALITATIVE U: NORMAL MG/DL
GLUCOSE SERPL-MCNC: 145 MG/DL
HCT VFR BLD CALC: 40.6 %
HGB BLD-MCNC: 13.4 G/DL
IMM GRANULOCYTES NFR BLD AUTO: 0.2 %
KETONES URINE: ABNORMAL
LEUKOCYTE ESTERASE URINE: NEGATIVE
LYMPHOCYTES # BLD AUTO: 1.32 K/UL
LYMPHOCYTES NFR BLD AUTO: 10.6 %
MAN DIFF?: NORMAL
MCHC RBC-ENTMCNC: 29.6 PG
MCHC RBC-ENTMCNC: 33 GM/DL
MCV RBC AUTO: 89.6 FL
MONOCYTES # BLD AUTO: 0.41 K/UL
MONOCYTES NFR BLD AUTO: 3.3 %
NEUTROPHILS # BLD AUTO: 10.68 K/UL
NEUTROPHILS NFR BLD AUTO: 85.8 %
NITRITE URINE: NEGATIVE
PH URINE: 6
PLATELET # BLD AUTO: 496 K/UL
POTASSIUM SERPL-SCNC: 4.5 MMOL/L
PROT SERPL-MCNC: 7.9 G/DL
PROTEIN URINE: NEGATIVE MG/DL
RBC # BLD: 4.53 M/UL
RBC # FLD: 17.9 %
SODIUM SERPL-SCNC: 137 MMOL/L
SPECIFIC GRAVITY URINE: 1.03
UROBILINOGEN URINE: 1 MG/DL
WBC # FLD AUTO: 12.45 K/UL

## 2017-05-17 LAB
ADJUSTED MITOGEN: 0.04 IU/ML
ADJUSTED TB AG: 0.01 IU/ML
ANA PAT FLD IF-IMP: ABNORMAL
ANA SER IF-ACNC: ABNORMAL
M TB IFN-G BLD-IMP: ABNORMAL
QUANTIFERON GOLD NIL: 0.02 IU/ML

## 2017-05-18 ENCOUNTER — APPOINTMENT (OUTPATIENT)
Dept: RHEUMATOLOGY | Facility: CLINIC | Age: 27
End: 2017-05-18

## 2017-05-18 ENCOUNTER — FORM ENCOUNTER (OUTPATIENT)
Age: 27
End: 2017-05-18

## 2017-05-18 VITALS
SYSTOLIC BLOOD PRESSURE: 100 MMHG | HEIGHT: 67 IN | DIASTOLIC BLOOD PRESSURE: 70 MMHG | OXYGEN SATURATION: 98 % | HEART RATE: 111 BPM

## 2017-05-18 RX ORDER — PREDNISONE 20 MG/1
20 TABLET ORAL TWICE DAILY
Qty: 60 | Refills: 0 | Status: DISCONTINUED | COMMUNITY
Start: 2017-03-15 | End: 2017-05-18

## 2017-05-19 ENCOUNTER — APPOINTMENT (OUTPATIENT)
Dept: MRI IMAGING | Facility: CLINIC | Age: 27
End: 2017-05-19

## 2017-05-19 ENCOUNTER — OUTPATIENT (OUTPATIENT)
Dept: OUTPATIENT SERVICES | Facility: HOSPITAL | Age: 27
LOS: 1 days | End: 2017-05-19
Payer: MEDICAID

## 2017-05-19 DIAGNOSIS — Z00.8 ENCOUNTER FOR OTHER GENERAL EXAMINATION: ICD-10-CM

## 2017-05-19 DIAGNOSIS — Z33.2 ENCOUNTER FOR ELECTIVE TERMINATION OF PREGNANCY: Chronic | ICD-10-CM

## 2017-05-19 DIAGNOSIS — Z98.89 OTHER SPECIFIED POSTPROCEDURAL STATES: Chronic | ICD-10-CM

## 2017-05-19 PROCEDURE — 73221 MRI JOINT UPR EXTREM W/O DYE: CPT

## 2017-05-31 ENCOUNTER — APPOINTMENT (OUTPATIENT)
Dept: INTERNAL MEDICINE | Facility: CLINIC | Age: 27
End: 2017-05-31

## 2017-05-31 ENCOUNTER — NON-APPOINTMENT (OUTPATIENT)
Age: 27
End: 2017-05-31

## 2017-05-31 VITALS
OXYGEN SATURATION: 98 % | BODY MASS INDEX: 33.74 KG/M2 | TEMPERATURE: 98.4 F | DIASTOLIC BLOOD PRESSURE: 84 MMHG | RESPIRATION RATE: 18 BRPM | SYSTOLIC BLOOD PRESSURE: 106 MMHG | WEIGHT: 215 LBS | HEIGHT: 67 IN | HEART RATE: 86 BPM

## 2017-05-31 DIAGNOSIS — J03.01 ACUTE RECURRENT STREPTOCOCCAL TONSILLITIS: ICD-10-CM

## 2017-05-31 DIAGNOSIS — Z86.19 PERSONAL HISTORY OF OTHER INFECTIOUS AND PARASITIC DISEASES: ICD-10-CM

## 2017-05-31 DIAGNOSIS — R05 COUGH: ICD-10-CM

## 2017-05-31 DIAGNOSIS — J35.01 CHRONIC TONSILLITIS: ICD-10-CM

## 2017-05-31 DIAGNOSIS — R21 RASH AND OTHER NONSPECIFIC SKIN ERUPTION: ICD-10-CM

## 2017-06-05 ENCOUNTER — MEDICATION RENEWAL (OUTPATIENT)
Age: 27
End: 2017-06-05

## 2017-06-05 ENCOUNTER — RX RENEWAL (OUTPATIENT)
Age: 27
End: 2017-06-05

## 2017-06-05 RX ORDER — BUDESONIDE AND FORMOTEROL FUMARATE DIHYDRATE 160; 4.5 UG/1; UG/1
160-4.5 AEROSOL RESPIRATORY (INHALATION) TWICE DAILY
Qty: 1 | Refills: 5 | Status: COMPLETED | COMMUNITY
Start: 2017-05-31 | End: 2017-06-05

## 2017-06-06 RX ORDER — MOMETASONE FUROATE AND FORMOTEROL FUMARATE DIHYDRATE 200; 5 UG/1; UG/1
200-5 AEROSOL RESPIRATORY (INHALATION) TWICE DAILY
Qty: 1 | Refills: 5 | Status: COMPLETED | COMMUNITY
Start: 2017-06-05 | End: 2017-06-06

## 2017-06-08 ENCOUNTER — APPOINTMENT (OUTPATIENT)
Dept: RHEUMATOLOGY | Facility: CLINIC | Age: 27
End: 2017-06-08

## 2017-06-10 ENCOUNTER — RX RENEWAL (OUTPATIENT)
Age: 27
End: 2017-06-10

## 2017-06-10 ENCOUNTER — LABORATORY RESULT (OUTPATIENT)
Age: 27
End: 2017-06-10

## 2017-06-10 ENCOUNTER — APPOINTMENT (OUTPATIENT)
Dept: RHEUMATOLOGY | Facility: CLINIC | Age: 27
End: 2017-06-10

## 2017-06-13 ENCOUNTER — RX RENEWAL (OUTPATIENT)
Age: 27
End: 2017-06-13

## 2017-06-28 ENCOUNTER — APPOINTMENT (OUTPATIENT)
Dept: RHEUMATOLOGY | Facility: CLINIC | Age: 27
End: 2017-06-28

## 2017-06-28 ENCOUNTER — LABORATORY RESULT (OUTPATIENT)
Age: 27
End: 2017-06-28

## 2017-06-28 ENCOUNTER — RX RENEWAL (OUTPATIENT)
Age: 27
End: 2017-06-28

## 2017-06-29 ENCOUNTER — APPOINTMENT (OUTPATIENT)
Dept: PAIN MANAGEMENT | Facility: CLINIC | Age: 27
End: 2017-06-29

## 2017-06-29 ENCOUNTER — EMERGENCY (EMERGENCY)
Facility: HOSPITAL | Age: 27
LOS: 1 days | Discharge: HOME | End: 2017-06-29
Admitting: EMERGENCY MEDICINE
Payer: MEDICAID

## 2017-06-29 VITALS
SYSTOLIC BLOOD PRESSURE: 104 MMHG | DIASTOLIC BLOOD PRESSURE: 66 MMHG | TEMPERATURE: 99 F | RESPIRATION RATE: 16 BRPM | OXYGEN SATURATION: 100 % | HEART RATE: 72 BPM

## 2017-06-29 VITALS
OXYGEN SATURATION: 98 % | DIASTOLIC BLOOD PRESSURE: 77 MMHG | RESPIRATION RATE: 16 BRPM | HEART RATE: 91 BPM | TEMPERATURE: 99 F | SYSTOLIC BLOOD PRESSURE: 120 MMHG

## 2017-06-29 VITALS
DIASTOLIC BLOOD PRESSURE: 82 MMHG | SYSTOLIC BLOOD PRESSURE: 131 MMHG | BODY MASS INDEX: 33.74 KG/M2 | WEIGHT: 215 LBS | HEIGHT: 67 IN | HEART RATE: 109 BPM

## 2017-06-29 DIAGNOSIS — Z33.2 ENCOUNTER FOR ELECTIVE TERMINATION OF PREGNANCY: Chronic | ICD-10-CM

## 2017-06-29 DIAGNOSIS — Z82.0 FAMILY HISTORY OF EPILEPSY AND OTHER DISEASES OF THE NERVOUS SYSTEM: ICD-10-CM

## 2017-06-29 DIAGNOSIS — Z98.89 OTHER SPECIFIED POSTPROCEDURAL STATES: Chronic | ICD-10-CM

## 2017-06-29 LAB
ALBUMIN SERPL ELPH-MCNC: 4 G/DL — SIGNIFICANT CHANGE UP (ref 3.3–5)
ALP SERPL-CCNC: 67 U/L — SIGNIFICANT CHANGE UP (ref 40–120)
ALT FLD-CCNC: 17 U/L RC — SIGNIFICANT CHANGE UP (ref 10–45)
ANION GAP SERPL CALC-SCNC: 13 MMOL/L — SIGNIFICANT CHANGE UP (ref 5–17)
APTT BLD: 29.3 SEC — SIGNIFICANT CHANGE UP (ref 27.5–37.4)
AST SERPL-CCNC: 16 U/L — SIGNIFICANT CHANGE UP (ref 10–40)
BASOPHILS # BLD AUTO: 0 K/UL — SIGNIFICANT CHANGE UP (ref 0–0.2)
BASOPHILS NFR BLD AUTO: 0.3 % — SIGNIFICANT CHANGE UP (ref 0–2)
BILIRUB SERPL-MCNC: 0.3 MG/DL — SIGNIFICANT CHANGE UP (ref 0.2–1.2)
BUN SERPL-MCNC: 8 MG/DL — SIGNIFICANT CHANGE UP (ref 7–23)
CALCIUM SERPL-MCNC: 9.5 MG/DL — SIGNIFICANT CHANGE UP (ref 8.4–10.5)
CHLORIDE SERPL-SCNC: 99 MMOL/L — SIGNIFICANT CHANGE UP (ref 96–108)
CO2 SERPL-SCNC: 26 MMOL/L — SIGNIFICANT CHANGE UP (ref 22–31)
CREAT SERPL-MCNC: 0.58 MG/DL — SIGNIFICANT CHANGE UP (ref 0.5–1.3)
EOSINOPHIL # BLD AUTO: 0 K/UL — SIGNIFICANT CHANGE UP (ref 0–0.5)
EOSINOPHIL NFR BLD AUTO: 0.9 % — SIGNIFICANT CHANGE UP (ref 0–6)
ERYTHROCYTE [SEDIMENTATION RATE] IN BLOOD: 23 MM/HR — HIGH (ref 0–15)
GAS PNL BLDV: SIGNIFICANT CHANGE UP
GLUCOSE SERPL-MCNC: 104 MG/DL — HIGH (ref 70–99)
HCG SERPL-ACNC: <2 MIU/ML — SIGNIFICANT CHANGE UP
HCT VFR BLD CALC: 37.3 % — SIGNIFICANT CHANGE UP (ref 34.5–45)
HGB BLD-MCNC: 13.2 G/DL — SIGNIFICANT CHANGE UP (ref 11.5–15.5)
INR BLD: 1.14 RATIO — SIGNIFICANT CHANGE UP (ref 0.88–1.16)
LYMPHOCYTES # BLD AUTO: 1.3 K/UL — SIGNIFICANT CHANGE UP (ref 1–3.3)
LYMPHOCYTES # BLD AUTO: 24.9 % — SIGNIFICANT CHANGE UP (ref 13–44)
MCHC RBC-ENTMCNC: 33.4 PG — SIGNIFICANT CHANGE UP (ref 27–34)
MCHC RBC-ENTMCNC: 35.3 GM/DL — SIGNIFICANT CHANGE UP (ref 32–36)
MCV RBC AUTO: 94.8 FL — SIGNIFICANT CHANGE UP (ref 80–100)
MONOCYTES # BLD AUTO: 0.4 K/UL — SIGNIFICANT CHANGE UP (ref 0–0.9)
MONOCYTES NFR BLD AUTO: 8.4 % — SIGNIFICANT CHANGE UP (ref 2–14)
NEUTROPHILS # BLD AUTO: 3.5 K/UL — SIGNIFICANT CHANGE UP (ref 1.8–7.4)
NEUTROPHILS NFR BLD AUTO: 65.5 % — SIGNIFICANT CHANGE UP (ref 43–77)
PLATELET # BLD AUTO: 303 K/UL — SIGNIFICANT CHANGE UP (ref 150–400)
POTASSIUM SERPL-MCNC: 3.5 MMOL/L — SIGNIFICANT CHANGE UP (ref 3.5–5.3)
POTASSIUM SERPL-SCNC: 3.5 MMOL/L — SIGNIFICANT CHANGE UP (ref 3.5–5.3)
PROT SERPL-MCNC: 7.2 G/DL — SIGNIFICANT CHANGE UP (ref 6–8.3)
PROTHROM AB SERPL-ACNC: 12.5 SEC — SIGNIFICANT CHANGE UP (ref 9.8–12.7)
RBC # BLD: 3.94 M/UL — SIGNIFICANT CHANGE UP (ref 3.8–5.2)
RBC # FLD: 13.6 % — SIGNIFICANT CHANGE UP (ref 10.3–14.5)
SODIUM SERPL-SCNC: 138 MMOL/L — SIGNIFICANT CHANGE UP (ref 135–145)
WBC # BLD: 5.3 K/UL — SIGNIFICANT CHANGE UP (ref 3.8–10.5)
WBC # FLD AUTO: 5.3 K/UL — SIGNIFICANT CHANGE UP (ref 3.8–10.5)

## 2017-06-29 PROCEDURE — 82330 ASSAY OF CALCIUM: CPT

## 2017-06-29 PROCEDURE — 85730 THROMBOPLASTIN TIME PARTIAL: CPT

## 2017-06-29 PROCEDURE — 84132 ASSAY OF SERUM POTASSIUM: CPT

## 2017-06-29 PROCEDURE — 96375 TX/PRO/DX INJ NEW DRUG ADDON: CPT | Mod: XU

## 2017-06-29 PROCEDURE — 84702 CHORIONIC GONADOTROPIN TEST: CPT

## 2017-06-29 PROCEDURE — 85027 COMPLETE CBC AUTOMATED: CPT

## 2017-06-29 PROCEDURE — 82435 ASSAY OF BLOOD CHLORIDE: CPT

## 2017-06-29 PROCEDURE — 82803 BLOOD GASES ANY COMBINATION: CPT

## 2017-06-29 PROCEDURE — 85014 HEMATOCRIT: CPT

## 2017-06-29 PROCEDURE — 82947 ASSAY GLUCOSE BLOOD QUANT: CPT

## 2017-06-29 PROCEDURE — 85610 PROTHROMBIN TIME: CPT

## 2017-06-29 PROCEDURE — 93010 ELECTROCARDIOGRAM REPORT: CPT

## 2017-06-29 PROCEDURE — 99284 EMERGENCY DEPT VISIT MOD MDM: CPT | Mod: 25

## 2017-06-29 PROCEDURE — 70496 CT ANGIOGRAPHY HEAD: CPT

## 2017-06-29 PROCEDURE — 99285 EMERGENCY DEPT VISIT HI MDM: CPT | Mod: 25

## 2017-06-29 PROCEDURE — 93005 ELECTROCARDIOGRAM TRACING: CPT

## 2017-06-29 PROCEDURE — 84295 ASSAY OF SERUM SODIUM: CPT

## 2017-06-29 PROCEDURE — 83605 ASSAY OF LACTIC ACID: CPT

## 2017-06-29 PROCEDURE — 80053 COMPREHEN METABOLIC PANEL: CPT

## 2017-06-29 PROCEDURE — 85652 RBC SED RATE AUTOMATED: CPT

## 2017-06-29 PROCEDURE — 96374 THER/PROPH/DIAG INJ IV PUSH: CPT | Mod: XU

## 2017-06-29 PROCEDURE — 70496 CT ANGIOGRAPHY HEAD: CPT | Mod: 26

## 2017-06-29 RX ORDER — KETOROLAC TROMETHAMINE 30 MG/ML
15 SYRINGE (ML) INJECTION ONCE
Qty: 0 | Refills: 0 | Status: DISCONTINUED | OUTPATIENT
Start: 2017-06-29 | End: 2017-06-29

## 2017-06-29 RX ORDER — TRIAMCINOLONE ACETONIDE 1 MG/G
0.1 OINTMENT TOPICAL
Qty: 30 | Refills: 0 | Status: COMPLETED | COMMUNITY
Start: 2017-03-13

## 2017-06-29 RX ORDER — ACETAMINOPHEN 500 MG
1000 TABLET ORAL ONCE
Qty: 0 | Refills: 0 | Status: COMPLETED | OUTPATIENT
Start: 2017-06-29 | End: 2017-06-29

## 2017-06-29 RX ORDER — SODIUM CHLORIDE 9 MG/ML
1000 INJECTION INTRAMUSCULAR; INTRAVENOUS; SUBCUTANEOUS ONCE
Qty: 0 | Refills: 0 | Status: COMPLETED | OUTPATIENT
Start: 2017-06-29 | End: 2017-06-29

## 2017-06-29 RX ORDER — MAGNESIUM SULFATE 500 MG/ML
2 VIAL (ML) INJECTION ONCE
Qty: 0 | Refills: 0 | Status: COMPLETED | OUTPATIENT
Start: 2017-06-29 | End: 2017-06-29

## 2017-06-29 RX ORDER — METHOCARBAMOL 750 MG/1
750 TABLET, FILM COATED ORAL
Qty: 20 | Refills: 0 | Status: COMPLETED | COMMUNITY
Start: 2017-02-07

## 2017-06-29 RX ORDER — ERGOCALCIFEROL 1.25 MG/1
1.25 MG CAPSULE, LIQUID FILLED ORAL
Qty: 4 | Refills: 0 | Status: COMPLETED | COMMUNITY
Start: 2017-01-11

## 2017-06-29 RX ORDER — MULTIVITAMIN WITH FOLIC ACID 400 MCG
TABLET ORAL
Qty: 30 | Refills: 0 | Status: COMPLETED | COMMUNITY
Start: 2017-01-10

## 2017-06-29 RX ORDER — METOCLOPRAMIDE HCL 10 MG
10 TABLET ORAL ONCE
Qty: 0 | Refills: 0 | Status: DISCONTINUED | OUTPATIENT
Start: 2017-06-29 | End: 2017-06-29

## 2017-06-29 RX ADMIN — Medication 50 GRAM(S): at 14:02

## 2017-06-29 RX ADMIN — Medication 1000 MILLIGRAM(S): at 12:17

## 2017-06-29 RX ADMIN — SODIUM CHLORIDE 2000 MILLILITER(S): 9 INJECTION INTRAMUSCULAR; INTRAVENOUS; SUBCUTANEOUS at 12:06

## 2017-06-29 RX ADMIN — Medication 400 MILLIGRAM(S): at 12:06

## 2017-06-29 RX ADMIN — Medication 204 MILLIGRAM(S): at 12:16

## 2017-06-29 RX ADMIN — Medication 60 MILLIGRAM(S): at 14:02

## 2017-06-29 NOTE — ED PROVIDER NOTE - ATTENDING CONTRIBUTION TO CARE
MD Fraser:  patient seen and evaluated with the resident.  I was present for key portions of the History & Physical, and I agree with the Impression & Plan.  MD Fraser:  26 yo F, c/o atypical HA, but on progesterone-contraception.  +MHx of migraine but no relief from her migraine meds.  No photophobia.  VS - wnl.  Physical Exam: adult F, NAD, NCAT, PERRL, EOMI, Neck supple, CTA B, RRR, Abd:  s/nd/nt.  Neuro:  PERRL EOMI, neck supple, strength 5/5 throughout, ambulates w/o difficulty.  Impression:  migraine vs dural sinus thrombosis.  Plan:  CTV head, phenergan, reassess.

## 2017-06-29 NOTE — ED ADULT NURSE NOTE - CHPI ED SYMPTOMS NEG
no numbness/no weakness/no confusion/no fever/no loss of consciousness/no change in level of consciousness

## 2017-06-29 NOTE — ED PROVIDER NOTE - PHYSICAL EXAMINATION
Gen: NAD, AOx3  Head: NCAT  HEENT: PERRL, oral mucosa moist, normal conjunctiva, neck supple, +ttp left temporal region   Lung: CTAB, no respiratory distress  CV: rrr, no murmur, Normal perfusion  Abd: soft, NTND  MSK: trace non pitting pedal edema b/l, no visible deformities  Neuro: No focal neurologic deficits, CN II-XII intact, 5/5 global strength and sensation intact  Skin: No rash   Psych: normal affect

## 2017-06-29 NOTE — ED PROVIDER NOTE - PROGRESS NOTE DETAILS
MONTERROSO improved, comes in waves, sleepy, slightly dizzy, labs and CT wnl except for mildly elevated ESR, but not elevated enoguh to be concerned for giant cell arteritis, and mild respiratory acidosis. will reasses after Mg/steroids -Slowey DO HA improved, not dizzy, awake and alert, feels good to go, has about with Dr. Ramos at 4pm will d/c to apt -Sander DO

## 2017-06-29 NOTE — ED PROVIDER NOTE - PLAN OF CARE
1. Return to ED for worsening, progressive or any other concerning symptoms   2. Follow up with your primary care doctor in 2-3days   3. See your neurologist today at 4pm

## 2017-06-29 NOTE — ED PROVIDER NOTE - MEDICAL DECISION MAKING DETAILS
migraine not typical for patient, has mirena IUD, venous sinus thrombosis on differential- will obtain CT with con. also with rheumatologic disease- not DC, will send ESR with temporal ttp and vision changes. no acute deficits now to be concerned for CVA. no infectious symptoms- no concern for meningitis/encephelitis. will tx pain. labs, CT-V and reasses. neuro consult

## 2017-06-29 NOTE — ED PROVIDER NOTE - OBJECTIVE STATEMENT
28yo F with h/o migraine, connective tissue disease of prednisone 20mg daily and IV infusion of abatacept. dull/slow onset of HA, no inciting event, was improved with motrin/excedrin initially, tried sumatriptan x2 no relief, ongoing, daily, intermittent between left/right eye, usually photo/phonophobic- currently not. neurologist told not to take any meds except excedrin for last 2 days, HA now severely worse, behind left eye, sharp, no neck pain. no fever/chills. had transient vision loss yesterday in left eye- took nap and resolved. intermittent chronic blurry vision. no vision change today, thought left eye looked droopy, no focal weakness, no gait off.     Neuro- Jill/Rigo

## 2017-06-29 NOTE — ED ADULT NURSE NOTE - OBJECTIVE STATEMENT
27F comes to ED c/o headache x20 days. Patient has PMH connective tissue disease, urinary retention and migraines. Patient states the headache began feeling similar to her typical migraines which are described as dull with photosensitivity and sensitivity to sound. Patient now has sharp headache to left side of head associated with dizziness, blurry vision, inability to concentrate and unsteady gait. She was told by outpatient provider to only take excedrin OTC. Patient vomited in ED. Yesterday she had a period of vision loss in left eye, and feels her left eye is not opening as wide as right. Patient denies SOB/chest pain/cough/fever/abdominal pain/recent travel. Patient currently has IUD placed, LMP 3 years ago. Neuro exam intact. Bedrails up for safety. Will continue to monitor.

## 2017-06-29 NOTE — ED PROVIDER NOTE - NS ED ROS FT
ROS: no CP/SOB. no cough. no n/v/d/c. no abd pain. no rash. no bleeding. no urinary complaints. no weakness. + vision changes. + HA. no neck/back pain. + extremity swelling.

## 2017-06-29 NOTE — ED PROVIDER NOTE - CARE PLAN
Principal Discharge DX:	Migraine without aura and without status migrainosus, not intractable  Instructions for follow-up, activity and diet:	1. Return to ED for worsening, progressive or any other concerning symptoms   2. Follow up with your primary care doctor in 2-3days   3. See your neurologist today at 4pm

## 2017-07-10 ENCOUNTER — FORM ENCOUNTER (OUTPATIENT)
Age: 27
End: 2017-07-10

## 2017-07-11 ENCOUNTER — OUTPATIENT (OUTPATIENT)
Dept: OUTPATIENT SERVICES | Facility: HOSPITAL | Age: 27
LOS: 1 days | End: 2017-07-11
Payer: MEDICAID

## 2017-07-11 ENCOUNTER — APPOINTMENT (OUTPATIENT)
Dept: ULTRASOUND IMAGING | Facility: CLINIC | Age: 27
End: 2017-07-11

## 2017-07-11 DIAGNOSIS — Z00.8 ENCOUNTER FOR OTHER GENERAL EXAMINATION: ICD-10-CM

## 2017-07-11 DIAGNOSIS — Z33.2 ENCOUNTER FOR ELECTIVE TERMINATION OF PREGNANCY: Chronic | ICD-10-CM

## 2017-07-11 DIAGNOSIS — Z98.89 OTHER SPECIFIED POSTPROCEDURAL STATES: Chronic | ICD-10-CM

## 2017-07-11 PROCEDURE — 76536 US EXAM OF HEAD AND NECK: CPT

## 2017-07-17 ENCOUNTER — APPOINTMENT (OUTPATIENT)
Dept: NEUROLOGY | Facility: CLINIC | Age: 27
End: 2017-07-17

## 2017-07-18 ENCOUNTER — LABORATORY RESULT (OUTPATIENT)
Age: 27
End: 2017-07-18

## 2017-07-18 ENCOUNTER — APPOINTMENT (OUTPATIENT)
Dept: RHEUMATOLOGY | Facility: CLINIC | Age: 27
End: 2017-07-18

## 2017-08-02 ENCOUNTER — APPOINTMENT (OUTPATIENT)
Dept: INTERNAL MEDICINE | Facility: CLINIC | Age: 27
End: 2017-08-02

## 2017-08-15 ENCOUNTER — LABORATORY RESULT (OUTPATIENT)
Age: 27
End: 2017-08-15

## 2017-08-15 ENCOUNTER — APPOINTMENT (OUTPATIENT)
Dept: RHEUMATOLOGY | Facility: CLINIC | Age: 27
End: 2017-08-15
Payer: COMMERCIAL

## 2017-08-15 PROCEDURE — 96413 CHEMO IV INFUSION 1 HR: CPT

## 2017-08-15 PROCEDURE — 36415 COLL VENOUS BLD VENIPUNCTURE: CPT

## 2017-08-15 RX ORDER — METHYLPREDNISOLONE 8 MG/1
8 TABLET ORAL DAILY
Qty: 90 | Refills: 2 | Status: DISCONTINUED | COMMUNITY
Start: 2017-05-18 | End: 2017-08-15

## 2017-08-15 RX ORDER — METHOTREXATE 25 MG/ML
50 INJECTION, SOLUTION INTRA-ARTERIAL; INTRAMUSCULAR; INTRAVENOUS
Qty: 4 | Refills: 4 | Status: DISCONTINUED | COMMUNITY
Start: 2017-03-15 | End: 2017-08-15

## 2017-08-17 ENCOUNTER — FORM ENCOUNTER (OUTPATIENT)
Age: 27
End: 2017-08-17

## 2017-08-18 ENCOUNTER — OUTPATIENT (OUTPATIENT)
Dept: OUTPATIENT SERVICES | Facility: HOSPITAL | Age: 27
LOS: 1 days | End: 2017-08-18
Payer: MEDICAID

## 2017-08-18 DIAGNOSIS — Z98.89 OTHER SPECIFIED POSTPROCEDURAL STATES: Chronic | ICD-10-CM

## 2017-08-18 DIAGNOSIS — Z33.2 ENCOUNTER FOR ELECTIVE TERMINATION OF PREGNANCY: Chronic | ICD-10-CM

## 2017-08-18 DIAGNOSIS — R10.2 PELVIC AND PERINEAL PAIN: ICD-10-CM

## 2017-08-18 PROCEDURE — 72100 X-RAY EXAM L-S SPINE 2/3 VWS: CPT | Mod: 26

## 2017-08-18 PROCEDURE — 71110 X-RAY EXAM RIBS BIL 3 VIEWS: CPT

## 2017-08-18 PROCEDURE — 71110 X-RAY EXAM RIBS BIL 3 VIEWS: CPT | Mod: 26

## 2017-08-18 PROCEDURE — 72100 X-RAY EXAM L-S SPINE 2/3 VWS: CPT

## 2017-08-19 ENCOUNTER — EMERGENCY (EMERGENCY)
Facility: HOSPITAL | Age: 27
LOS: 1 days | Discharge: HOME | End: 2017-08-19
Attending: EMERGENCY MEDICINE | Admitting: EMERGENCY MEDICINE
Payer: MEDICAID

## 2017-08-19 VITALS
OXYGEN SATURATION: 98 % | DIASTOLIC BLOOD PRESSURE: 67 MMHG | RESPIRATION RATE: 20 BRPM | SYSTOLIC BLOOD PRESSURE: 118 MMHG | HEART RATE: 89 BPM | TEMPERATURE: 99 F

## 2017-08-19 VITALS
HEART RATE: 109 BPM | DIASTOLIC BLOOD PRESSURE: 73 MMHG | WEIGHT: 214.95 LBS | RESPIRATION RATE: 16 BRPM | OXYGEN SATURATION: 95 % | TEMPERATURE: 99 F | SYSTOLIC BLOOD PRESSURE: 115 MMHG

## 2017-08-19 DIAGNOSIS — Z98.89 OTHER SPECIFIED POSTPROCEDURAL STATES: Chronic | ICD-10-CM

## 2017-08-19 DIAGNOSIS — Z33.2 ENCOUNTER FOR ELECTIVE TERMINATION OF PREGNANCY: Chronic | ICD-10-CM

## 2017-08-19 DIAGNOSIS — G43.109 MIGRAINE WITH AURA, NOT INTRACTABLE, WITHOUT STATUS MIGRAINOSUS: ICD-10-CM

## 2017-08-19 LAB
ALBUMIN SERPL ELPH-MCNC: 3.9 G/DL — SIGNIFICANT CHANGE UP (ref 3.3–5)
ALP SERPL-CCNC: 69 U/L — SIGNIFICANT CHANGE UP (ref 40–120)
ALT FLD-CCNC: 11 U/L RC — SIGNIFICANT CHANGE UP (ref 10–45)
ANION GAP SERPL CALC-SCNC: 13 MMOL/L — SIGNIFICANT CHANGE UP (ref 5–17)
APPEARANCE UR: CLEAR — SIGNIFICANT CHANGE UP
AST SERPL-CCNC: 14 U/L — SIGNIFICANT CHANGE UP (ref 10–40)
BACTERIA # UR AUTO: ABNORMAL /HPF
BASOPHILS # BLD AUTO: 0 K/UL — SIGNIFICANT CHANGE UP (ref 0–0.2)
BASOPHILS NFR BLD AUTO: 0.6 % — SIGNIFICANT CHANGE UP (ref 0–2)
BILIRUB SERPL-MCNC: 0.4 MG/DL — SIGNIFICANT CHANGE UP (ref 0.2–1.2)
BILIRUB UR-MCNC: NEGATIVE — SIGNIFICANT CHANGE UP
BUN SERPL-MCNC: 12 MG/DL — SIGNIFICANT CHANGE UP (ref 7–23)
CALCIUM SERPL-MCNC: 9.1 MG/DL — SIGNIFICANT CHANGE UP (ref 8.4–10.5)
CHLORIDE SERPL-SCNC: 105 MMOL/L — SIGNIFICANT CHANGE UP (ref 96–108)
CO2 SERPL-SCNC: 22 MMOL/L — SIGNIFICANT CHANGE UP (ref 22–31)
COLOR SPEC: YELLOW — SIGNIFICANT CHANGE UP
CREAT SERPL-MCNC: 0.55 MG/DL — SIGNIFICANT CHANGE UP (ref 0.5–1.3)
DIFF PNL FLD: NEGATIVE — SIGNIFICANT CHANGE UP
EOSINOPHIL # BLD AUTO: 0 K/UL — SIGNIFICANT CHANGE UP (ref 0–0.5)
EOSINOPHIL NFR BLD AUTO: 0.8 % — SIGNIFICANT CHANGE UP (ref 0–6)
EPI CELLS # UR: SIGNIFICANT CHANGE UP /HPF
GLUCOSE SERPL-MCNC: 97 MG/DL — SIGNIFICANT CHANGE UP (ref 70–99)
GLUCOSE UR QL: NEGATIVE — SIGNIFICANT CHANGE UP
HCG UR QL: NEGATIVE — SIGNIFICANT CHANGE UP
HCT VFR BLD CALC: 38.8 % — SIGNIFICANT CHANGE UP (ref 34.5–45)
HGB BLD-MCNC: 13.1 G/DL — SIGNIFICANT CHANGE UP (ref 11.5–15.5)
KETONES UR-MCNC: NEGATIVE — SIGNIFICANT CHANGE UP
LEUKOCYTE ESTERASE UR-ACNC: ABNORMAL
LYMPHOCYTES # BLD AUTO: 1.5 K/UL — SIGNIFICANT CHANGE UP (ref 1–3.3)
LYMPHOCYTES # BLD AUTO: 38.9 % — SIGNIFICANT CHANGE UP (ref 13–44)
MCHC RBC-ENTMCNC: 31 PG — SIGNIFICANT CHANGE UP (ref 27–34)
MCHC RBC-ENTMCNC: 33.8 GM/DL — SIGNIFICANT CHANGE UP (ref 32–36)
MCV RBC AUTO: 91.8 FL — SIGNIFICANT CHANGE UP (ref 80–100)
MONOCYTES # BLD AUTO: 0.5 K/UL — SIGNIFICANT CHANGE UP (ref 0–0.9)
MONOCYTES NFR BLD AUTO: 14.3 % — HIGH (ref 2–14)
NEUTROPHILS # BLD AUTO: 1.7 K/UL — LOW (ref 1.8–7.4)
NEUTROPHILS NFR BLD AUTO: 45.4 % — SIGNIFICANT CHANGE UP (ref 43–77)
NITRITE UR-MCNC: NEGATIVE — SIGNIFICANT CHANGE UP
PH UR: 6 — SIGNIFICANT CHANGE UP (ref 5–8)
PLATELET # BLD AUTO: 319 K/UL — SIGNIFICANT CHANGE UP (ref 150–400)
POTASSIUM SERPL-MCNC: 3.6 MMOL/L — SIGNIFICANT CHANGE UP (ref 3.5–5.3)
POTASSIUM SERPL-SCNC: 3.6 MMOL/L — SIGNIFICANT CHANGE UP (ref 3.5–5.3)
PROT SERPL-MCNC: 7.3 G/DL — SIGNIFICANT CHANGE UP (ref 6–8.3)
PROT UR-MCNC: SIGNIFICANT CHANGE UP
RBC # BLD: 4.22 M/UL — SIGNIFICANT CHANGE UP (ref 3.8–5.2)
RBC # FLD: 12.5 % — SIGNIFICANT CHANGE UP (ref 10.3–14.5)
RBC CASTS # UR COMP ASSIST: SIGNIFICANT CHANGE UP /HPF (ref 0–2)
SODIUM SERPL-SCNC: 140 MMOL/L — SIGNIFICANT CHANGE UP (ref 135–145)
SP GR SPEC: 1.02 — SIGNIFICANT CHANGE UP (ref 1.01–1.02)
UROBILINOGEN FLD QL: NEGATIVE — SIGNIFICANT CHANGE UP
WBC # BLD: 3.8 K/UL — SIGNIFICANT CHANGE UP (ref 3.8–10.5)
WBC # FLD AUTO: 3.8 K/UL — SIGNIFICANT CHANGE UP (ref 3.8–10.5)
WBC UR QL: SIGNIFICANT CHANGE UP /HPF (ref 0–5)

## 2017-08-19 PROCEDURE — 84443 ASSAY THYROID STIM HORMONE: CPT

## 2017-08-19 PROCEDURE — 96374 THER/PROPH/DIAG INJ IV PUSH: CPT

## 2017-08-19 PROCEDURE — 99284 EMERGENCY DEPT VISIT MOD MDM: CPT | Mod: 25

## 2017-08-19 PROCEDURE — 80053 COMPREHEN METABOLIC PANEL: CPT

## 2017-08-19 PROCEDURE — 85027 COMPLETE CBC AUTOMATED: CPT

## 2017-08-19 PROCEDURE — 81025 URINE PREGNANCY TEST: CPT

## 2017-08-19 PROCEDURE — 99284 EMERGENCY DEPT VISIT MOD MDM: CPT

## 2017-08-19 PROCEDURE — 81001 URINALYSIS AUTO W/SCOPE: CPT

## 2017-08-19 RX ORDER — SODIUM CHLORIDE 9 MG/ML
1000 INJECTION INTRAMUSCULAR; INTRAVENOUS; SUBCUTANEOUS
Qty: 0 | Refills: 0 | Status: DISCONTINUED | OUTPATIENT
Start: 2017-08-19 | End: 2017-08-23

## 2017-08-19 RX ORDER — METOCLOPRAMIDE HCL 10 MG
10 TABLET ORAL ONCE
Qty: 0 | Refills: 0 | Status: DISCONTINUED | OUTPATIENT
Start: 2017-08-19 | End: 2017-08-19

## 2017-08-19 RX ORDER — METOCLOPRAMIDE HCL 10 MG
10 TABLET ORAL ONCE
Qty: 0 | Refills: 0 | Status: COMPLETED | OUTPATIENT
Start: 2017-08-19 | End: 2017-08-19

## 2017-08-19 RX ORDER — DIPHENHYDRAMINE HCL 50 MG
25 CAPSULE ORAL ONCE
Qty: 0 | Refills: 0 | Status: COMPLETED | OUTPATIENT
Start: 2017-08-19 | End: 2017-08-19

## 2017-08-19 RX ADMIN — Medication 10 MILLIGRAM(S): at 13:56

## 2017-08-19 RX ADMIN — SODIUM CHLORIDE 150 MILLILITER(S): 9 INJECTION INTRAMUSCULAR; INTRAVENOUS; SUBCUTANEOUS at 13:57

## 2017-08-19 NOTE — ED ADULT NURSE NOTE - OBJECTIVE STATEMENT
Pt presents to Ed with c/o feeling "spacey" experiencing intermittent visual changes  and periods of memory loss. Pt A&Ox3, denies headache or dizziness, no fever or chills  vital signs stable.

## 2017-08-19 NOTE — ED PROVIDER NOTE - ATTENDING CONTRIBUTION TO CARE
------------ATTENDING NOTE------------   26 yo F c/o weeks of vague symptoms, describing feeling confused and losing train of thought, feeling off balance, d/w her Neuro (Chong Sierra) who sent her to ED for Neuro consult, no fevers/trauma, recent ED eval for same 1 month ago, ED Sign Out pending Neuro recs.  - Dustin Aguiar MD   ---------------------------------------------------------------------------------------------------

## 2017-08-19 NOTE — CONSULT NOTE ADULT - PROBLEM SELECTOR RECOMMENDATION 9
Symptoms likely due to migraine with aura.  Patient symptoms resolved, she should follow up with her neurologist on Monday.  She should be notified that the symptoms may return, but should not return to ED unless significantly different than before

## 2017-08-19 NOTE — ED PROVIDER NOTE - OBJECTIVE STATEMENT
27 YOF pmh migraines presents to ed with confused "spacing" out sensation. Pt denies any current headache. Pt feels as if her vision is slightly zoomed in. Pt feels like she's having some word finding difficulties. Pt denies any nausea or vomiting. Pt denies any abdominal pain.

## 2017-08-19 NOTE — CONSULT NOTE ADULT - SUBJECTIVE AND OBJECTIVE BOX
Neurology Consult    Name  MAAME CROUCH    Patient is a 27 year old female w/ PMHx of migraine, lupus, RA, presenting with several weeks of vague symptoms, which initially started with feeling confused and losing train of thought and some word finding difficulty.  On 8/16, she began feeling off balance and had a strange sensation of drifting and not being able to move her eyes.  Patient sent email to her Neurologist (Chong Sierra) who informed her to go to the ED if symptoms worsen. Neurology consulted for symptoms.                                                      MEDICATIONS  (STANDING):  sodium chloride 0.9%. 1000 milliLiter(s) (150 mL/Hr) IV Continuous <Continuous>    MEDICATIONS  (PRN):      Allergies    bee stings (Anaphylaxis)  Demerol HCl (Rash)  morphine (Faint; Other (Moderate))    Intolerances        Objective  Vital Signs Last 24 Hrs  T(C): 37.4 (19 Aug 2017 12:45), Max: 37.4 (19 Aug 2017 12:45)  T(F): 99.3 (19 Aug 2017 12:45), Max: 99.3 (19 Aug 2017 12:45)  HR: 109 (19 Aug 2017 12:45) (109 - 109)  BP: 115/73 (19 Aug 2017 12:45) (115/73 - 115/73)  BP(mean): --  RR: 16 (19 Aug 2017 12:45) (16 - 16)  SpO2: 95% (19 Aug 2017 12:45) (95% - 95%)    General Exam   General appearance: No acute distress, well-nourished  Respiratory:    non-labored respirations               Neurological Exam  Mental Status:  alert and oriented x3, fluent speech, following commands, repetition and naming intact    Cranial Nerves: PERRL, EOMI without nystagmus, visual fields intact no facial droop, no dysarthria    Motor:   Tone:   normal               Strength:  Upper extremity                          Delt       Bicep    Tricep                                                  R         5/5        5/5        5/5       5/5                                               L          5/5        5/5        5/5      5/5    Lower extremity                           HF          KE          KF        DF         PF                                               R        5/5        5/5        5/5       5/5       5/5                                               L         5/5        5/5        5/5      5/5        5/5    Pronator drift:   none           Dysmetria: none with finger-to-nose testing  Tremor:  none appreciated at rest or in action    Sensation: intact grossly to light touch    Deep Tendon Reflexes: 2+ throughout  Toes flexor bilaterally     Gait:  normal gait    Other Studies  Romberg negative    08-19    140  |  105  |  12  ----------------------------<  97  3.6   |  22  |  0.55    Ca    9.1      19 Aug 2017 14:04    TPro  7.3  /  Alb  3.9  /  TBili  0.4  /  DBili  x   /  AST  14  /  ALT  11  /  AlkPhos  69  08-19 08-19    140  |  105  |  12  ----------------------------<  97  3.6   |  22  |  0.55    Ca    9.1      19 Aug 2017 14:04    TPro  7.3  /  Alb  3.9  /  TBili  0.4  /  DBili  x   /  AST  14  /  ALT  11  /  AlkPhos  69  08-19    LIVER FUNCTIONS - ( 19 Aug 2017 14:04 )  Alb: 3.9 g/dL / Pro: 7.3 g/dL / ALK PHOS: 69 U/L / ALT: 11 U/L RC / AST: 14 U/L / GGT: x             Radiology

## 2017-08-19 NOTE — CONSULT NOTE ADULT - ASSESSMENT
27 year old female w/ PMHx of migraine, lupus, RA, presenting with several weeks of vague symptoms including word finding difficulty and sensation of motion

## 2017-08-19 NOTE — ED ADULT NURSE NOTE - PMH
Anemia    Asthma  No h/o intubation, dose not use ventolin on daily basis, denies hospitalization due to asthma  Chronic tonsillitis    Emphysema lung    Enlarged lymph nodes  dx: 2014  History of Clostridium difficile infection  2016, not an active infection  Lupus    Migraine    Multigravida    RA (rheumatoid arthritis)    Seronegative arthritis    Vitamin D deficiency

## 2017-08-19 NOTE — ED PROVIDER NOTE - PROGRESS NOTE DETAILS
Pt feels much better, no longer has her spaced out feeling. Will d/c home. Neurology recommends d/c home with follow up with Dr. Sierra on monday.

## 2017-08-20 LAB — TSH SERPL-MCNC: 2.35 UIU/ML — SIGNIFICANT CHANGE UP (ref 0.27–4.2)

## 2017-08-22 ENCOUNTER — FORM ENCOUNTER (OUTPATIENT)
Age: 27
End: 2017-08-22

## 2017-08-23 ENCOUNTER — INPATIENT (INPATIENT)
Facility: HOSPITAL | Age: 27
LOS: 2 days | Discharge: HOME | DRG: 581 | End: 2017-08-26
Attending: INTERNAL MEDICINE | Admitting: INTERNAL MEDICINE
Payer: COMMERCIAL

## 2017-08-23 ENCOUNTER — APPOINTMENT (OUTPATIENT)
Dept: MRI IMAGING | Facility: HOSPITAL | Age: 27
End: 2017-08-23

## 2017-08-23 ENCOUNTER — OUTPATIENT (OUTPATIENT)
Dept: OUTPATIENT SERVICES | Facility: HOSPITAL | Age: 27
LOS: 1 days | End: 2017-08-23
Payer: COMMERCIAL

## 2017-08-23 ENCOUNTER — APPOINTMENT (OUTPATIENT)
Dept: RHEUMATOLOGY | Facility: CLINIC | Age: 27
End: 2017-08-23
Payer: COMMERCIAL

## 2017-08-23 VITALS
DIASTOLIC BLOOD PRESSURE: 78 MMHG | OXYGEN SATURATION: 99 % | HEART RATE: 103 BPM | SYSTOLIC BLOOD PRESSURE: 119 MMHG | RESPIRATION RATE: 18 BRPM | TEMPERATURE: 99 F | WEIGHT: 210.1 LBS

## 2017-08-23 VITALS
OXYGEN SATURATION: 98 % | DIASTOLIC BLOOD PRESSURE: 66 MMHG | BODY MASS INDEX: 33.12 KG/M2 | HEART RATE: 110 BPM | SYSTOLIC BLOOD PRESSURE: 114 MMHG | TEMPERATURE: 98 F | RESPIRATION RATE: 18 BRPM | WEIGHT: 211 LBS | HEIGHT: 67 IN

## 2017-08-23 DIAGNOSIS — L03.90 CELLULITIS, UNSPECIFIED: ICD-10-CM

## 2017-08-23 DIAGNOSIS — R51 HEADACHE: ICD-10-CM

## 2017-08-23 DIAGNOSIS — Z98.89 OTHER SPECIFIED POSTPROCEDURAL STATES: Chronic | ICD-10-CM

## 2017-08-23 DIAGNOSIS — G93.9 DISORDER OF BRAIN, UNSPECIFIED: ICD-10-CM

## 2017-08-23 DIAGNOSIS — Z33.2 ENCOUNTER FOR ELECTIVE TERMINATION OF PREGNANCY: Chronic | ICD-10-CM

## 2017-08-23 LAB
ALBUMIN SERPL ELPH-MCNC: 4.5 G/DL — SIGNIFICANT CHANGE UP (ref 3.3–5)
ALP SERPL-CCNC: 75 U/L — SIGNIFICANT CHANGE UP (ref 40–120)
ALT FLD-CCNC: 12 U/L RC — SIGNIFICANT CHANGE UP (ref 10–45)
ANION GAP SERPL CALC-SCNC: 14 MMOL/L — SIGNIFICANT CHANGE UP (ref 5–17)
AST SERPL-CCNC: 14 U/L — SIGNIFICANT CHANGE UP (ref 10–40)
BASOPHILS # BLD AUTO: 0 K/UL — SIGNIFICANT CHANGE UP (ref 0–0.2)
BASOPHILS NFR BLD AUTO: 0.6 % — SIGNIFICANT CHANGE UP (ref 0–2)
BILIRUB SERPL-MCNC: 0.3 MG/DL — SIGNIFICANT CHANGE UP (ref 0.2–1.2)
BUN SERPL-MCNC: 13 MG/DL — SIGNIFICANT CHANGE UP (ref 7–23)
CALCIUM SERPL-MCNC: 9.4 MG/DL — SIGNIFICANT CHANGE UP (ref 8.4–10.5)
CHLORIDE SERPL-SCNC: 101 MMOL/L — SIGNIFICANT CHANGE UP (ref 96–108)
CO2 SERPL-SCNC: 26 MMOL/L — SIGNIFICANT CHANGE UP (ref 22–31)
CREAT SERPL-MCNC: 0.64 MG/DL — SIGNIFICANT CHANGE UP (ref 0.5–1.3)
EOSINOPHIL # BLD AUTO: 0 K/UL — SIGNIFICANT CHANGE UP (ref 0–0.5)
EOSINOPHIL NFR BLD AUTO: 0.5 % — SIGNIFICANT CHANGE UP (ref 0–6)
GLUCOSE SERPL-MCNC: 99 MG/DL — SIGNIFICANT CHANGE UP (ref 70–99)
HCT VFR BLD CALC: 40.3 % — SIGNIFICANT CHANGE UP (ref 34.5–45)
HGB BLD-MCNC: 13.6 G/DL — SIGNIFICANT CHANGE UP (ref 11.5–15.5)
LYMPHOCYTES # BLD AUTO: 1.8 K/UL — SIGNIFICANT CHANGE UP (ref 1–3.3)
LYMPHOCYTES # BLD AUTO: 31.2 % — SIGNIFICANT CHANGE UP (ref 13–44)
MCHC RBC-ENTMCNC: 30.9 PG — SIGNIFICANT CHANGE UP (ref 27–34)
MCHC RBC-ENTMCNC: 33.7 GM/DL — SIGNIFICANT CHANGE UP (ref 32–36)
MCV RBC AUTO: 91.8 FL — SIGNIFICANT CHANGE UP (ref 80–100)
MONOCYTES # BLD AUTO: 0.6 K/UL — SIGNIFICANT CHANGE UP (ref 0–0.9)
MONOCYTES NFR BLD AUTO: 9.9 % — SIGNIFICANT CHANGE UP (ref 2–14)
NEUTROPHILS # BLD AUTO: 3.4 K/UL — SIGNIFICANT CHANGE UP (ref 1.8–7.4)
NEUTROPHILS NFR BLD AUTO: 57.7 % — SIGNIFICANT CHANGE UP (ref 43–77)
PLATELET # BLD AUTO: 361 K/UL — SIGNIFICANT CHANGE UP (ref 150–400)
POTASSIUM SERPL-MCNC: 3.3 MMOL/L — LOW (ref 3.5–5.3)
POTASSIUM SERPL-SCNC: 3.3 MMOL/L — LOW (ref 3.5–5.3)
PROT SERPL-MCNC: 7.9 G/DL — SIGNIFICANT CHANGE UP (ref 6–8.3)
RBC # BLD: 4.39 M/UL — SIGNIFICANT CHANGE UP (ref 3.8–5.2)
RBC # FLD: 12.6 % — SIGNIFICANT CHANGE UP (ref 10.3–14.5)
SODIUM SERPL-SCNC: 141 MMOL/L — SIGNIFICANT CHANGE UP (ref 135–145)
WBC # BLD: 5.9 K/UL — SIGNIFICANT CHANGE UP (ref 3.8–10.5)
WBC # FLD AUTO: 5.9 K/UL — SIGNIFICANT CHANGE UP (ref 3.8–10.5)

## 2017-08-23 PROCEDURE — 70491 CT SOFT TISSUE NECK W/DYE: CPT | Mod: 26

## 2017-08-23 PROCEDURE — 99213 OFFICE O/P EST LOW 20 MIN: CPT

## 2017-08-23 PROCEDURE — 99285 EMERGENCY DEPT VISIT HI MDM: CPT

## 2017-08-23 PROCEDURE — 70553 MRI BRAIN STEM W/O & W/DYE: CPT | Mod: 26

## 2017-08-23 PROCEDURE — A9585: CPT

## 2017-08-23 PROCEDURE — 70553 MRI BRAIN STEM W/O & W/DYE: CPT

## 2017-08-23 RX ORDER — FLUTICASONE PROPIONATE 50 MCG
1 SPRAY, SUSPENSION NASAL DAILY
Qty: 0 | Refills: 0 | Status: DISCONTINUED | OUTPATIENT
Start: 2017-08-23 | End: 2017-08-26

## 2017-08-23 RX ORDER — HYDROCHLOROTHIAZIDE 25 MG
0 TABLET ORAL
Qty: 0 | Refills: 0 | COMMUNITY

## 2017-08-23 RX ORDER — TOPIRAMATE 25 MG
0 TABLET ORAL
Qty: 0 | Refills: 0 | COMMUNITY

## 2017-08-23 RX ORDER — SODIUM CHLORIDE 9 MG/ML
1000 INJECTION INTRAMUSCULAR; INTRAVENOUS; SUBCUTANEOUS ONCE
Qty: 0 | Refills: 0 | Status: COMPLETED | OUTPATIENT
Start: 2017-08-23 | End: 2017-08-23

## 2017-08-23 RX ORDER — BUDESONIDE AND FORMOTEROL FUMARATE DIHYDRATE 160; 4.5 UG/1; UG/1
2 AEROSOL RESPIRATORY (INHALATION)
Qty: 0 | Refills: 0 | Status: DISCONTINUED | OUTPATIENT
Start: 2017-08-23 | End: 2017-08-26

## 2017-08-23 RX ORDER — ACETAMINOPHEN 500 MG
650 TABLET ORAL EVERY 6 HOURS
Qty: 0 | Refills: 0 | Status: DISCONTINUED | OUTPATIENT
Start: 2017-08-23 | End: 2017-08-26

## 2017-08-23 RX ORDER — LEVOTHYROXINE SODIUM 125 MCG
0 TABLET ORAL
Qty: 0 | Refills: 0 | COMMUNITY

## 2017-08-23 RX ORDER — LEVOTHYROXINE SODIUM 125 MCG
50 TABLET ORAL DAILY
Qty: 0 | Refills: 0 | Status: DISCONTINUED | OUTPATIENT
Start: 2017-08-23 | End: 2017-08-26

## 2017-08-23 RX ORDER — ALBUTEROL 90 UG/1
2 AEROSOL, METERED ORAL
Qty: 0 | Refills: 0 | Status: DISCONTINUED | OUTPATIENT
Start: 2017-08-23 | End: 2017-08-26

## 2017-08-23 RX ORDER — HYDROXYCHLOROQUINE SULFATE 200 MG
200 TABLET ORAL
Qty: 0 | Refills: 0 | Status: DISCONTINUED | OUTPATIENT
Start: 2017-08-23 | End: 2017-08-26

## 2017-08-23 RX ORDER — ACETAMINOPHEN 500 MG
1000 TABLET ORAL ONCE
Qty: 0 | Refills: 0 | Status: COMPLETED | OUTPATIENT
Start: 2017-08-23 | End: 2017-08-23

## 2017-08-23 RX ORDER — HYDROCHLOROTHIAZIDE 25 MG
12.5 TABLET ORAL DAILY
Qty: 0 | Refills: 0 | Status: DISCONTINUED | OUTPATIENT
Start: 2017-08-23 | End: 2017-08-26

## 2017-08-23 RX ORDER — TOPIRAMATE 25 MG
50 TABLET ORAL
Qty: 0 | Refills: 0 | Status: DISCONTINUED | OUTPATIENT
Start: 2017-08-23 | End: 2017-08-26

## 2017-08-23 RX ORDER — VANCOMYCIN HCL 1 G
1000 VIAL (EA) INTRAVENOUS EVERY 12 HOURS
Qty: 0 | Refills: 0 | Status: DISCONTINUED | OUTPATIENT
Start: 2017-08-23 | End: 2017-08-25

## 2017-08-23 RX ORDER — VANCOMYCIN HCL 1 G
1000 VIAL (EA) INTRAVENOUS ONCE
Qty: 0 | Refills: 0 | Status: COMPLETED | OUTPATIENT
Start: 2017-08-23 | End: 2017-08-23

## 2017-08-23 RX ORDER — FOLIC ACID 0.8 MG
1 TABLET ORAL DAILY
Qty: 0 | Refills: 0 | Status: DISCONTINUED | OUTPATIENT
Start: 2017-08-23 | End: 2017-08-26

## 2017-08-23 RX ORDER — MONTELUKAST 4 MG/1
10 TABLET, CHEWABLE ORAL DAILY
Qty: 0 | Refills: 0 | Status: DISCONTINUED | OUTPATIENT
Start: 2017-08-23 | End: 2017-08-26

## 2017-08-23 RX ORDER — POTASSIUM CHLORIDE 20 MEQ
40 PACKET (EA) ORAL ONCE
Qty: 0 | Refills: 0 | Status: COMPLETED | OUTPATIENT
Start: 2017-08-23 | End: 2017-08-23

## 2017-08-23 RX ORDER — PANTOPRAZOLE SODIUM 20 MG/1
40 TABLET, DELAYED RELEASE ORAL
Qty: 0 | Refills: 0 | Status: DISCONTINUED | OUTPATIENT
Start: 2017-08-23 | End: 2017-08-26

## 2017-08-23 RX ADMIN — SODIUM CHLORIDE 1000 MILLILITER(S): 9 INJECTION INTRAMUSCULAR; INTRAVENOUS; SUBCUTANEOUS at 18:01

## 2017-08-23 RX ADMIN — Medication 400 MILLIGRAM(S): at 18:02

## 2017-08-23 RX ADMIN — Medication 250 MILLIGRAM(S): at 18:01

## 2017-08-23 RX ADMIN — Medication 40 MILLIEQUIVALENT(S): at 18:02

## 2017-08-23 NOTE — ED PROVIDER NOTE - OBJECTIVE STATEMENT
26yo female pmh migraines, lupus on prednisone 50mg, RA on Orencia (8/15 last infusion)p/w fevers and left sided submental abscess 2d ago increasing in size. +clear drainage when expressed. Subjective fevers/chills since last night temporary improvement with Motrin. Denies n/v/d, dysuria, travel, back pain. Coworker with sinus infection.   MRI brain this morning "showed something" as per rheum NP Nicole Brown.

## 2017-08-23 NOTE — CONSULT NOTE ADULT - PROBLEM SELECTOR RECOMMENDATION 9
- IV abx   - CNT monitoring size   - continue care as per primary team - IV Vanco   - CNT monitoring size   - continue care as per primary team

## 2017-08-23 NOTE — ED PROVIDER NOTE - ATTENDING CONTRIBUTION TO CARE
Att yo female PMH lupus on prednisone, Orencia presents with left submental abscess x 2 days with fevers, chills, nausea; no vomiting; no dental pain; hx of sepsis in the past s/p fingertip incision for abscess; on exam nad, tachycardic, febrile, 3 x 3 cm left anterior neck area of induration with mild fluctuance and overlying erythema (per patient erythema has gotten larger since yesterday); warm to touch; concern for necks abscess with cellulitis and early signs of sepsis especially in setting of previous sepsis and immunosuppressants; Plan; labs, cultures, iv abx, ivf, antipyretics, ct neck to assess for extension of abscess; admission

## 2017-08-23 NOTE — ED ADULT NURSE NOTE - OBJECTIVE STATEMENT
26 yo F presents to ED A+Ox3 c/o left side neck abscess. Pt. states "it started out looking like a pimple" two days ago. Pt. states she "squeezed" the area two days ago, drained clear, pink-tinged fluid. Pt. states left side of neck is becoming more increasingly swollen. Red, raised area noted to left side of neck. Pt. reports nausea and x2 days of subjective fever at home. Denies chest pain, SOB, difficulty breathing/difficulty swallowing. Breathing unlabored on RA. Skin warm pink and dry. Abdomen soft. Comfort and safety measures in place.

## 2017-08-23 NOTE — ED PROVIDER NOTE - MEDICAL DECISION MAKING DETAILS
Jadon Mandel, PGY2: 27F pmh RA/SLE on prednisone and abatacept p/w left ant neck abscess with overlying cellulitis. No tenderness or induration within oral cavity. Tachycardic. Rectal temp, cultures, CT neck with IV contrast, IVF, tylenol and vanc (penicillin allergic). Jadon Mandel, PGY2: 27F pmh RA/SLE on prednisone and abatacept p/w left ant neck abscess with overlying cellulitis. No tenderness or induration within oral cavity. Tachycardic. Rectal temp, cultures, CT neck with IV contrast, IVF, tylenol and vanc (penicillin allergic).  Att yo female PMH lupus on prednisone, Orencia presents with left submental abscess x 2 days with fevers, chills, nausea; no vomiting; no dental pain; hx of sepsis in the past s/p fingertip incision for abscess; on exam nad, tachycardic, febrile, 3 x 3 cm left anterior neck area of induration with mild fluctuance and overlying erythema (per patient erythema has gotten larger since yesterday); warm to touch; concern for necks abscess with cellulitis and early signs of sepsis especially in setting of previous sepsis and immunosuppressants; Plan; labs, cultures, iv abx, ivf, antipyretics, ct neck to assess for extension of abscess; admission

## 2017-08-23 NOTE — H&P ADULT - ASSESSMENT
· Assessment		  27y old female with pmhx of lupus recently on prednisone, R.A. on Orencia infusion (last given 8/15/17) presents with two left submental cutaneous abscess vs cellulitis     Problem/Recommendation - 1:  Problem: Cellulitis and abscess. Recommendation: - IV Vanco   - ent evaluated pt - s/p laryngoscopy - neg   - poss iR for drain

## 2017-08-23 NOTE — H&P ADULT - HISTORY OF PRESENT ILLNESS
28yo female pmh migraines, lupus on prednisone 50mg, RA on Orencia (8/15 last infusion)p/w fevers and left sided submental abscess 2d ago increasing in size. +clear drainage when expressed. Subjective fevers/chills since last night temporary improvement with Motrin. Denies n/v/d, dysuria, travel, back pain. Coworker with sinus infection. dysphagia + , no hoarseness of voice, no dyspnea

## 2017-08-23 NOTE — H&P ADULT - NSHPPHYSICALEXAM_GEN_ALL_CORE
Gen: NAD, well-developed  Head: Normocephalic, Atraumatic  Face: no edema  Eyes: no scleral injection  Nose: Nares bilaterally patent, no discharge  Mouth: No Stridor / Drooling / Trismus.  Mucosa moist, tongue/uvula midline, oropharynx clear  Neck: induration and fluctuance of 3x3cm left anterior neck , TTP, no active bleeding or drainage.  Resp: breathing easily, no stridor  CV: no peripheral edema/cyanosis

## 2017-08-23 NOTE — ED PROVIDER NOTE - PROGRESS NOTE DETAILS
CT shows fat stranding but no deeper spread of abscess. ENT consulted. IV vanc given. Pt comfortable. Will admit as patient is immunocompromised. Patient seen by Dr. Chahal in ED. Rosaline Lema DO

## 2017-08-23 NOTE — ED PROVIDER NOTE - FAMILY HISTORY
Mother  Still living? Yes, Estimated age: 51-60  Family history of thyroid disease, Age at diagnosis: Age Unknown     Father  Still living? Yes, Estimated age: 51-60  Family history of hyperlipidemia, Age at diagnosis: Age Unknown

## 2017-08-23 NOTE — CONSULT NOTE ADULT - SUBJECTIVE AND OBJECTIVE BOX
CC: left neck abscess     HPI: Patient is a 27y old female with pmhx of lupus recently on prednisone, R.A. on Orencia infusion (last given 8/15/17) presents with left submental abscess x 2 days. Pt states abscess is increasing in size and tenderness and is associated with subjective fevers, chills and generalize malaise. Pt attempted to express yesterday with slight clear drainage but no pain relief. Today, her neck has become increasingly painful, erythematic, and warm to touch. Pt notes trismus, slight dysphagia/pressure when she swallows, and slight difficulty breathing while laying supine. Pt denies any odynophagia, hemoptysis, sore throat, inability to tolerated secretions, SOB.         PAST MEDICAL & SURGICAL HISTORY:  RA (rheumatoid arthritis)  Lupus  Asthma: No h/o intubation, dose not use ventolin on daily basis, denies hospitalization due to asthma  History of Clostridium difficile infection: 2016, not an active infection  Chronic tonsillitis  Migraine  Emphysema lung  Enlarged lymph nodes: dx:   Multigravida  Anemia  Vitamin D deficiency  Seronegative arthritis  , legal:   S/P arthroscopy of knee:     Allergies    bee stings (Anaphylaxis)  Demerol HCl (Rash)  morphine (Faint; Other (Moderate))    Intolerances    penicillin (Other)    MEDICATIONS  (STANDING):  vancomycin  IVPB 1000 milliGRAM(s) IV Intermittent every 12 hours    MEDICATIONS  (PRN):  acetaminophen   Tablet. 650 milliGRAM(s) Oral every 6 hours PRN Mild Pain (1 - 3)      Social History: **??**    ROS: ENT, GI, , CV, Pulm, Neuro, Psych, MS, Heme, Endo, Constitutional; all negative except as noted in HPI    Vital Signs Last 24 Hrs  T(C): 37.2 (23 Aug 2017 19:30), Max: 37.9 (23 Aug 2017 15:37)  T(F): 98.9 (23 Aug 2017 19:30), Max: 100.3 (23 Aug 2017 15:37)  HR: 92 (23 Aug 2017 19:30) (89 - 103)  BP: 108/76 (23 Aug 2017 19:30) (108/76 - 119/78)  BP(mean): --  RR: 18 (23 Aug 2017 19:30) (18 - 18)  SpO2: 100% (23 Aug 2017 19:30) (99% - 100%)                          13.6   5.9   )-----------( 361      ( 23 Aug 2017 15:51 )             40.3        141  |  101  |  13  ----------------------------<  99  3.3<L>   |  26  |  0.64    Ca    9.4      23 Aug 2017 15:51    TPro  7.9  /  Alb  4.5  /  TBili  0.3  /  DBili  x   /  AST  14  /  ALT  12  /  AlkPhos  75         PHYSICAL EXAM:  Gen: NAD, well-developed  Head: Normocephalic, Atraumatic  Face: no edema/erythema/fluctuance, parotid glands soft without mass  Eyes: PERRL, EOMI, no scleral injection  Ears: Right - ear canal clear, TM intact without effusion / erythema.  No evidence of any fluid drainage.  No Mastoid tenderness / erythema/ ear bulging            Left - ear canal clear, TM intact without effusion / erythema.  No evidence of any fluid drainage.  No Mastoid tenderness / erythema/ ear bulging  Nose: Nares bilaterally patent, no discharge  Mouth: No Stridor / Drooling / Trismus.  Mucosa moist, tongue/uvula midline, oropharynx clear  Neck: Flat, supple, no lymphadenopathy, trachea midline, no masses  Resp: breathing easily, no stridor  CV: no peripheral edema/cyanosis    Fiberoptic Indirect laryngoscopy:  (With Scope #2) CC: left neck abscess     HPI: Patient is a 27y old female with pmhx of lupus recently on prednisone, R.A. on Orencia infusion (last given 8/15/17) presents with left submental abscess x 2 days. Pt states abscess is increasing in size and tenderness and is associated with subjective fevers, chills and generalize malaise. Pt attempted to express yesterday with slight clear drainage but no pain relief. Today, her neck has become increasingly painful, erythematic, and warm to touch. Pt notes trismus, slight dysphagia/pressure when she swallows, and slight difficulty breathing while laying supine. Pt denies any odynophagia, hemoptysis, sore throat, inability to tolerated secretions, and SOB.         PAST MEDICAL & SURGICAL HISTORY:  RA (rheumatoid arthritis)  Lupus  Asthma: No h/o intubation, dose not use ventolin on daily basis, denies hospitalization due to asthma  History of Clostridium difficile infection: 2016, not an active infection  Chronic tonsillitis  Migraine  Emphysema lung  Enlarged lymph nodes: dx:   Multigravida  Anemia  Vitamin D deficiency  Seronegative arthritis  , legal:   S/P arthroscopy of knee:     Allergies    bee stings (Anaphylaxis)  Demerol HCl (Rash)  morphine (Faint; Other (Moderate))    Intolerances    penicillin (Other)    MEDICATIONS  (STANDING):  vancomycin  IVPB 1000 milliGRAM(s) IV Intermittent every 12 hours    MEDICATIONS  (PRN):  acetaminophen   Tablet. 650 milliGRAM(s) Oral every 6 hours PRN Mild Pain (1 - 3)      Social History: denies tobacco     ROS: ENT- neck abscess, GI, , CV, Pulm, Neuro, Psych, MS, Heme, Endo, Constitutional; all negative except as noted in HPI    Vital Signs Last 24 Hrs  T(C): 37.2 (23 Aug 2017 19:30), Max: 37.9 (23 Aug 2017 15:37)  T(F): 98.9 (23 Aug 2017 19:30), Max: 100.3 (23 Aug 2017 15:37)  HR: 92 (23 Aug 2017 19:30) (89 - 103)  BP: 108/76 (23 Aug 2017 19:30) (108/76 - 119/78)  BP(mean): --  RR: 18 (23 Aug 2017 19:30) (18 - 18)  SpO2: 100% (23 Aug 2017 19:30) (99% - 100%)                          13.6   5.9   )-----------( 361      ( 23 Aug 2017 15:51 )             40.3    -    141  |  101  |  13  ----------------------------<  99  3.3<L>   |  26  |  0.64    Ca    9.4      23 Aug 2017 15:51    TPro  7.9  /  Alb  4.5  /  TBili  0.3  /  DBili  x   /  AST  14  /  ALT  12  /  AlkPhos  75         PHYSICAL EXAM:  Gen: NAD, well-developed  Head: Normocephalic, Atraumatic  Face: no edema  Eyes: no scleral injection  Nose: Nares bilaterally patent, no discharge  Mouth: No Stridor / Drooling / Trismus.  Mucosa moist, tongue/uvula midline, oropharynx clear  Neck: induration and fluctuance of 3x3cm left anterior neck , TTP, no active bleeding or drainage.  Resp: breathing easily, no stridor  CV: no peripheral edema/cyanosis    Fiberoptic Indirect laryngoscopy:  (With Scope #2) No bleeding in nasal pharynx or Oral pharynx.  No foreign body or pooling of secretions.  No glottic / supraglottic swelling.  Vocal cords mobile in intact b/l.  Airway patent. CC: left neck abscess     HPI: Patient is a 27y old female with pmhx of lupus recently on prednisone, R.A. on Orencia infusion (last given 8/15/17) presents with left submental abscess x 2 days. Pt states abscess is increasing in size and tenderness and is associated with subjective fevers, chills and generalize malaise. Pt attempted to express yesterday with slight clear drainage but no pain relief. Today, her neck has become increasingly painful, erythematic, and warm to touch. Pt notes trismus, slight dysphagia/pressure when she swallows, and slight difficulty breathing while laying supine. Pt denies any odynophagia, hemoptysis, sore throat, inability to tolerated secretions, and SOB.         PAST MEDICAL & SURGICAL HISTORY:  RA (rheumatoid arthritis)  Lupus  Asthma: No h/o intubation, dose not use ventolin on daily basis, denies hospitalization due to asthma  History of Clostridium difficile infection: 2016, not an active infection  Chronic tonsillitis  Migraine  Emphysema lung  Enlarged lymph nodes: dx:   Multigravida  Anemia  Vitamin D deficiency  Seronegative arthritis  , legal:   S/P arthroscopy of knee:     Allergies    bee stings (Anaphylaxis)  Demerol HCl (Rash)  morphine (Faint; Other (Moderate))    Intolerances    penicillin (Other)    MEDICATIONS  (STANDING):  vancomycin  IVPB 1000 milliGRAM(s) IV Intermittent every 12 hours    MEDICATIONS  (PRN):  acetaminophen   Tablet. 650 milliGRAM(s) Oral every 6 hours PRN Mild Pain (1 - 3)      Social History: denies tobacco     ROS: ENT- neck abscess, GI, , CV, Pulm, Neuro, Psych, MS, Heme, Endo, Constitutional; all negative except as noted in HPI    Vital Signs Last 24 Hrs  T(C): 37.2 (23 Aug 2017 19:30), Max: 37.9 (23 Aug 2017 15:37)  T(F): 98.9 (23 Aug 2017 19:30), Max: 100.3 (23 Aug 2017 15:37)  HR: 92 (23 Aug 2017 19:30) (89 - 103)  BP: 108/76 (23 Aug 2017 19:30) (108/76 - 119/78)  BP(mean): --  RR: 18 (23 Aug 2017 19:30) (18 - 18)  SpO2: 100% (23 Aug 2017 19:30) (99% - 100%)                          13.6   5.9   )-----------( 361      ( 23 Aug 2017 15:51 )             40.3        141  |  101  |  13  ----------------------------<  99  3.3<L>   |  26  |  0.64    Ca    9.4      23 Aug 2017 15:51    TPro  7.9  /  Alb  4.5  /  TBili  0.3  /  DBili  x   /  AST  14  /  ALT  12  /  AlkPhos  75    < from: CT Neck Soft Tissue w/ IV Cont (17 @ 17:03) >  EXAM:  CT NECK SOFT TISSUE IC                            PROCEDURE DATE:  2017            INTERPRETATION:  INDICATIONS:  Left neck swelling with abscess. Evaluate   for local spread.    TECHNIQUE:   Axial images were obtained on following theadministration   of 90 cc of Omnipaque 300 nonionic intravenous contrast, with 10 cc   discarded. Sagittal and coronal reformatted images were obtained.      COMPARISON EXAMINATION:  Neck ultrasound 2014.    FINDINGS:  AERODIGESTIVE TRACT:  Normal.  LYMPH NODES:  No adenopathy is seen.  PAROTID GLANDS:  Normal.  SUBMANDIBULAR GLANDS:  Normal.  THYROID GLAND:  Normal.  VISUALIZED PARANASAL SINUSES:  Clear.  VISUALIZED TYMPANOMASTOID CAVITIES: Clear.  BONES:  Normal.  MISCELLANEOUS: Focal cutaneous thickening is seen on the left at the   level of the mandibular symphysis (2:96) there is a central zone of low   density and mild peripheral enhancement and this may represent an area of   cellulitis or a focal abscess. There is stranding of underlying fat   planes in this area both superficial and deep to the platysma in the   region of the submandibular space with mild thickening of the platysma   muscle. A similar but smaller area of cutaneous thickening is seen just   cephalad tothis (2:81)    Scattered blebs are seen within the upper lobes.    IMPRESSION:  Cutaneous soft tissue thickening in 2 locations at the level   of the mandible on the left with underlying fat stranding. These may   represent small cutaneous abscesses or cellulitis.    No deep abscess collection identified.      < end of copied text >         PHYSICAL EXAM:  Gen: NAD, well-developed  Head: Normocephalic, Atraumatic  Face: no edema  Eyes: no scleral injection  Nose: Nares bilaterally patent, no discharge  Mouth: No Stridor / Drooling / Trismus.  Mucosa moist, tongue/uvula midline, oropharynx clear  Neck: induration and fluctuance of 3x3cm left anterior neck , TTP, no active bleeding or drainage.  Resp: breathing easily, no stridor  CV: no peripheral edema/cyanosis    Fiberoptic Indirect laryngoscopy:  (With Scope #2) No bleeding in nasal pharynx or Oral pharynx.  No foreign body or pooling of secretions.  No glottic / supraglottic swelling.  Vocal cords mobile in intact b/l.  Airway patent. CC: left neck abscess     HPI: Patient is a 27y old female with pmhx of lupus recently on prednisone, R.A. on Orencia infusion (last given 8/15/17) presents with left submental abscess x 2 days. Pt states abscess is increasing in size and tenderness and is associated with subjective fevers, chills and generalize malaise. Pt attempted to express yesterday with slight clear drainage but no pain relief. Today, her neck has become increasingly painful, erythematic, and warm to touch. Pt notes trismus, slight dysphagia/pressure when she swallows, and slight difficulty breathing while laying supine. Pt denies any odynophagia, hemoptysis, sore throat, inability to tolerated secretions, and SOB. Pt also has history of sepsis approximately 2 years ago from splinter in her thumb due to being immunocompromised.         PAST MEDICAL & SURGICAL HISTORY:  RA (rheumatoid arthritis)  Lupus  Asthma: No h/o intubation, dose not use ventolin on daily basis, denies hospitalization due to asthma  History of Clostridium difficile infection: 2016, not an active infection  Chronic tonsillitis  Migraine  Emphysema lung  Enlarged lymph nodes: dx:   Multigravida  Anemia  Vitamin D deficiency  Seronegative arthritis  , legal:   S/P arthroscopy of knee: 2004    Allergies    bee stings (Anaphylaxis)  Demerol HCl (Rash)  morphine (Faint; Other (Moderate))    Intolerances    penicillin (Other)    MEDICATIONS  (STANDING):  vancomycin  IVPB 1000 milliGRAM(s) IV Intermittent every 12 hours    MEDICATIONS  (PRN):  acetaminophen   Tablet. 650 milliGRAM(s) Oral every 6 hours PRN Mild Pain (1 - 3)      Social History: denies tobacco     ROS: ENT- neck abscess, GI, , CV, Pulm, Neuro, Psych, MS, Heme, Endo, Constitutional; all negative except as noted in HPI    Vital Signs Last 24 Hrs  T(C): 37.2 (23 Aug 2017 19:30), Max: 37.9 (23 Aug 2017 15:37)  T(F): 98.9 (23 Aug 2017 19:30), Max: 100.3 (23 Aug 2017 15:37)  HR: 92 (23 Aug 2017 19:30) (89 - 103)  BP: 108/76 (23 Aug 2017 19:30) (108/76 - 119/78)  BP(mean): --  RR: 18 (23 Aug 2017 19:30) (18 - 18)  SpO2: 100% (23 Aug 2017 19:30) (99% - 100%)                          13.6   5.9   )-----------( 361      ( 23 Aug 2017 15:51 )             40.3        141  |  101  |  13  ----------------------------<  99  3.3<L>   |  26  |  0.64    Ca    9.4      23 Aug 2017 15:51    TPro  7.9  /  Alb  4.5  /  TBili  0.3  /  DBili  x   /  AST  14  /  ALT  12  /  AlkPhos  75    < from: CT Neck Soft Tissue w/ IV Cont (17 @ 17:03) >  EXAM:  CT NECK SOFT TISSUE IC                            PROCEDURE DATE:  2017            INTERPRETATION:  INDICATIONS:  Left neck swelling with abscess. Evaluate   for local spread.    TECHNIQUE:   Axial images were obtained on following theadministration   of 90 cc of Omnipaque 300 nonionic intravenous contrast, with 10 cc   discarded. Sagittal and coronal reformatted images were obtained.      COMPARISON EXAMINATION:  Neck ultrasound 2014.    FINDINGS:  AERODIGESTIVE TRACT:  Normal.  LYMPH NODES:  No adenopathy is seen.  PAROTID GLANDS:  Normal.  SUBMANDIBULAR GLANDS:  Normal.  THYROID GLAND:  Normal.  VISUALIZED PARANASAL SINUSES:  Clear.  VISUALIZED TYMPANOMASTOID CAVITIES: Clear.  BONES:  Normal.  MISCELLANEOUS: Focal cutaneous thickening is seen on the left at the   level of the mandibular symphysis (2:96) there is a central zone of low   density and mild peripheral enhancement and this may represent an area of   cellulitis or a focal abscess. There is stranding of underlying fat   planes in this area both superficial and deep to the platysma in the   region of the submandibular space with mild thickening of the platysma   muscle. A similar but smaller area of cutaneous thickening is seen just   cephalad tothis (2:81)    Scattered blebs are seen within the upper lobes.    IMPRESSION:  Cutaneous soft tissue thickening in 2 locations at the level   of the mandible on the left with underlying fat stranding. These may   represent small cutaneous abscesses or cellulitis.    No deep abscess collection identified.      < end of copied text >         PHYSICAL EXAM:  Gen: NAD, well-developed  Head: Normocephalic, Atraumatic  Face: no edema  Eyes: no scleral injection  Nose: Nares bilaterally patent, no discharge  Mouth: No Stridor / Drooling / Trismus.  Mucosa moist, tongue/uvula midline, oropharynx clear  Neck: induration and fluctuance of 3x3cm left anterior neck , TTP, no active bleeding or drainage.  Resp: breathing easily, no stridor  CV: no peripheral edema/cyanosis    Fiberoptic Indirect laryngoscopy:  (With Scope #2) No bleeding in nasal pharynx or Oral pharynx.  No foreign body or pooling of secretions.  No glottic / supraglottic swelling.  Vocal cords mobile in intact b/l.  Airway patent.

## 2017-08-23 NOTE — ED PROVIDER NOTE - SKIN WOUND DESCRIPTION
induration and fluctuance of 3x3cm left anterior neck , TTP, no active bleeding or drainage. No tenderness within oral cavity induration and fluctuance of 3x3cm left anterior neck , TTP, no active bleeding or drainage. No tenderness within oral cavity. Localized overlying erythema

## 2017-08-24 ENCOUNTER — APPOINTMENT (OUTPATIENT)
Dept: ULTRASOUND IMAGING | Facility: HOSPITAL | Age: 27
End: 2017-08-24

## 2017-08-24 LAB
ANION GAP SERPL CALC-SCNC: 14 MMOL/L — SIGNIFICANT CHANGE UP (ref 5–17)
BUN SERPL-MCNC: 10 MG/DL — SIGNIFICANT CHANGE UP (ref 7–23)
CALCIUM SERPL-MCNC: 9.6 MG/DL — SIGNIFICANT CHANGE UP (ref 8.4–10.5)
CHLORIDE SERPL-SCNC: 105 MMOL/L — SIGNIFICANT CHANGE UP (ref 96–108)
CO2 SERPL-SCNC: 23 MMOL/L — SIGNIFICANT CHANGE UP (ref 22–31)
CREAT SERPL-MCNC: 0.61 MG/DL — SIGNIFICANT CHANGE UP (ref 0.5–1.3)
GLUCOSE SERPL-MCNC: 102 MG/DL — HIGH (ref 70–99)
POTASSIUM SERPL-MCNC: 3.6 MMOL/L — SIGNIFICANT CHANGE UP (ref 3.5–5.3)
POTASSIUM SERPL-SCNC: 3.6 MMOL/L — SIGNIFICANT CHANGE UP (ref 3.5–5.3)
SODIUM SERPL-SCNC: 142 MMOL/L — SIGNIFICANT CHANGE UP (ref 135–145)

## 2017-08-24 PROCEDURE — 99254 IP/OBS CNSLTJ NEW/EST MOD 60: CPT

## 2017-08-24 RX ORDER — OXYCODONE AND ACETAMINOPHEN 5; 325 MG/1; MG/1
1 TABLET ORAL ONCE
Qty: 0 | Refills: 0 | Status: DISCONTINUED | OUTPATIENT
Start: 2017-08-24 | End: 2017-08-24

## 2017-08-24 RX ADMIN — Medication 650 MILLIGRAM(S): at 23:52

## 2017-08-24 RX ADMIN — Medication 1 SPRAY(S): at 12:41

## 2017-08-24 RX ADMIN — Medication 50 MILLIGRAM(S): at 05:07

## 2017-08-24 RX ADMIN — BUDESONIDE AND FORMOTEROL FUMARATE DIHYDRATE 2 PUFF(S): 160; 4.5 AEROSOL RESPIRATORY (INHALATION) at 05:08

## 2017-08-24 RX ADMIN — Medication 50 MICROGRAM(S): at 05:07

## 2017-08-24 RX ADMIN — Medication 200 MILLIGRAM(S): at 09:14

## 2017-08-24 RX ADMIN — Medication 50 MILLIGRAM(S): at 17:42

## 2017-08-24 RX ADMIN — MONTELUKAST 10 MILLIGRAM(S): 4 TABLET, CHEWABLE ORAL at 11:34

## 2017-08-24 RX ADMIN — OXYCODONE AND ACETAMINOPHEN 1 TABLET(S): 5; 325 TABLET ORAL at 20:27

## 2017-08-24 RX ADMIN — Medication 1 MILLIGRAM(S): at 11:34

## 2017-08-24 RX ADMIN — PANTOPRAZOLE SODIUM 40 MILLIGRAM(S): 20 TABLET, DELAYED RELEASE ORAL at 05:07

## 2017-08-24 RX ADMIN — Medication 12.5 MILLIGRAM(S): at 05:07

## 2017-08-24 RX ADMIN — Medication 200 MILLIGRAM(S): at 17:42

## 2017-08-24 RX ADMIN — OXYCODONE AND ACETAMINOPHEN 1 TABLET(S): 5; 325 TABLET ORAL at 21:15

## 2017-08-24 RX ADMIN — Medication 250 MILLIGRAM(S): at 05:07

## 2017-08-24 RX ADMIN — Medication 650 MILLIGRAM(S): at 22:52

## 2017-08-24 RX ADMIN — Medication 250 MILLIGRAM(S): at 17:42

## 2017-08-24 RX ADMIN — Medication 10 MILLIGRAM(S): at 11:34

## 2017-08-24 NOTE — CONSULT NOTE ADULT - SUBJECTIVE AND OBJECTIVE BOX
"HPI: 28yo female pmh migraines, lupus on prednisone 50mg, RA on Orencia (8/15 last infusion)p/w fevers and left sided submental abscess 2d ago increasing in size. +clear drainage when expressed. Subjective fevers/chills since last night temporary improvement with Motrin. Denies n/v/d, dysuria, travel, back pain. Coworker with sinus infection. dysphagia + , no hoarseness of voice, no dyspnea (23 Aug 2017 21:23)"    Saw/spoke to patient. No measured fevers, has chills. Patient has pain and swelling at L side of the neck, developing over past few days. Patient noted drainage from site with clear, nonpurulent material. Patient has multiple children who have had "MRSA" abscesses on their bodies, and the family has had difficulty with MRSA spreading from person to person. They have attempted decolonization previously. Patient has not had a sore throat, Has not had any signs of odontogenic infection or sinus infection. No recent antibiotics.    PAST MEDICAL & SURGICAL HISTORY:  RA (rheumatoid arthritis)  Lupus  Asthma: No h/o intubation, dose not use ventolin on daily basis, denies hospitalization due to asthma  History of Clostridium difficile infection: 2016, not an active infection  Chronic tonsillitis  Migraine  Emphysema lung  Enlarged lymph nodes: dx:   Multigravida  Anemia  Vitamin D deficiency  Seronegative arthritis  , legal:   S/P arthroscopy of knee:     Allergies    bee stings (Anaphylaxis)  Demerol HCl (Rash)  morphine (Faint; Other (Moderate))  penicillin (Other)    ANTIMICROBIALS:  vancomycin  IVPB 1000 every 12 hours  hydroxychloroquine 200 two times a day with meals    OTHER MEDS:  acetaminophen   Tablet. 650 milliGRAM(s) Oral every 6 hours PRN  topiramate 50 milliGRAM(s) Oral two times a day  PARoxetine 10 milliGRAM(s) Oral daily  buDESOnide 160 MICROgram(s)/formoterol 4.5 MICROgram(s) Inhaler 2 Puff(s) Inhalation two times a day  ALBUTerol    90 MICROgram(s) HFA Inhaler 2 Puff(s) Inhalation four times a day PRN  pantoprazole    Tablet 40 milliGRAM(s) Oral before breakfast  montelukast 10 milliGRAM(s) Oral daily  fluticasone propionate 50 MICROgram(s)/spray Nasal Spray 1 Spray(s) Both Nostrils daily  levothyroxine 50 MICROGram(s) Oral daily  folic acid 1 milliGRAM(s) Oral daily  hydrochlorothiazide 12.5 milliGRAM(s) Oral daily    SOCIAL HISTORY: No tobacco, no alcohol, no illicit drugs    FAMILY HISTORY:  Family history of hyperlipidemia  Family history of thyroid disease    Drug Dosing Weight  Height (cm): 170.18 (24 Aug 2017 02:31)  Weight (kg): 95.3 (23 Aug 2017 14:31)  BMI (kg/m2): 32.9 (24 Aug 2017 02:31)  BSA (m2): 2.06 (24 Aug 2017 02:31)    PE:    Vital Signs Last 24 Hrs  T(C): 37.4 (24 Aug 2017 07:13), Max: 37.9 (23 Aug 2017 15:37)  T(F): 99.3 (24 Aug 2017 07:13), Max: 100.3 (23 Aug 2017 15:37)  HR: 75 (24 Aug 2017 07:13) (68 - 103)  BP: 120/75 (24 Aug 2017 07:13) (100/69 - 120/75)  BP(mean): --  RR: 18 (24 Aug 2017 07:13) (17 - 18)  SpO2: 98% (24 Aug 2017 07:13) (98% - 100%)    Gen: AOx3, NAD, non-toxic, pleasant  HEENT: L sided neck wound s/p I+D, packing in place  CV: S1+S2 normal, no murmurs, nontachycardic  Resp: Clear bilat, no resp distress, no crackles/wheezes  Abd: Soft, nontender, +BS  Ext: No LE edema, no wounds, has L thigh small papular lesion  : No Oscar, no suprapubic tenderness  IV/Skin: No thrombophlebitis, mild spreading redness from L side of neck  Neuro: No focal deficits, no sensory deficits    LABS:                        13.6   5.9   )-----------( 361      ( 23 Aug 2017 15:51 )             40.3     08-    141  |  101  |  13  ----------------------------<  99  3.3<L>   |  26  |  0.64    Ca    9.4      23 Aug 2017 15:51    TPro  7.9  /  Alb  4.5  /  TBili  0.3  /  DBili  x   /  AST  14  /  ALT  12  /  AlkPhos  75      MICROBIOLOGY:    BCX x3 Pending    RADIOLOGY:     MRI Head:    IMPRESSION:  No acute infarction, hemorrhage, mass, lesion or extra-axial collections.   Nonspecific focus of increased signal intensity on the T2-weighted images   in the left frontal subcortical white matter. Differential diagnostic   possibilities include prior foci of infection, inflammation,   demyelination or ischemia.    CT Neck :    IMPRESSION:  Cutaneous soft tissue thickening in 2 locations at the level   of the mandible on the left with underlying fat stranding. These may   represent small cutaneous abscesses or cellulitis.    No deep abscess collection identified.

## 2017-08-24 NOTE — PROGRESS NOTE ADULT - SUBJECTIVE AND OBJECTIVE BOX
chief complaint : fever, chin  swelling     SUBJECTIVE / OVERNIGHT EVENTS:pt says her shin feels less swollen       MEDICATIONS  (STANDING):  vancomycin  IVPB 1000 milliGRAM(s) IV Intermittent every 12 hours  topiramate 50 milliGRAM(s) Oral two times a day  hydroxychloroquine 200 milliGRAM(s) Oral two times a day with meals  PARoxetine 10 milliGRAM(s) Oral daily  buDESOnide 160 MICROgram(s)/formoterol 4.5 MICROgram(s) Inhaler 2 Puff(s) Inhalation two times a day  pantoprazole    Tablet 40 milliGRAM(s) Oral before breakfast  montelukast 10 milliGRAM(s) Oral daily  fluticasone propionate 50 MICROgram(s)/spray Nasal Spray 1 Spray(s) Both Nostrils daily  levothyroxine 50 MICROGram(s) Oral daily  folic acid 1 milliGRAM(s) Oral daily  hydrochlorothiazide 12.5 milliGRAM(s) Oral daily    MEDICATIONS  (PRN):  acetaminophen   Tablet. 650 milliGRAM(s) Oral every 6 hours PRN Mild Pain (1 - 3)  ALBUTerol    90 MICROgram(s) HFA Inhaler 2 Puff(s) Inhalation four times a day PRN Bronchospasm        CAPILLARY BLOOD GLUCOSE        I&O's Summary    24 Aug 2017 07:01  -  24 Aug 2017 22:00  --------------------------------------------------------  IN: 370 mL / OUT: 0 mL / NET: 370 mL        Constitutional: No fever, fatigue  Skin: No rash.  Eyes: No recent vision problems or eye pain.  ENT: No congestion, ear pain, or sore throat.  Cardiovascular: No chest pain or palpation.  Respiratory: No cough, shortness of breath, congestion, or wheezing.  Gastrointestinal: No abdominal pain, nausea, vomiting, or diarrhea.  Genitourinary: No dysuria.  Musculoskeletal: No joint swelling.  Neurologic: No headache.    PHYSICAL EXAM:  GENERAL: NAD  EYES: EOMI, PERRLA  NECK: Supple, No JVD, left submental area - dec swelling / dressing +  CHEST/LUNG:   HEART:  S1 , S2 +  ABDOMEN: soft, bs+  EXTREMITIES:  no edema  NEUROLOGY:alert awake oriented      LABS:                        13.6   5.9   )-----------( 361      ( 23 Aug 2017 15:51 )             40.3     08-24    142  |  105  |  10  ----------------------------<  102<H>  3.6   |  23  |  0.61    Ca    9.6      24 Aug 2017 17:35    TPro  7.9  /  Alb  4.5  /  TBili  0.3  /  DBili  x   /  AST  14  /  ALT  12  /  AlkPhos  75  08-23              RADIOLOGY & ADDITIONAL TESTS:    Imaging Personally Reviewed:    Consultant(s) Notes Reviewed:      Care Discussed with Consultants/Other Providers:

## 2017-08-24 NOTE — CONSULT NOTE ADULT - ASSESSMENT
27y old female with pmhx of lupus recently on prednisone, R.A. on Orencia infusion (last given 8/15/17) presents with two left submental cutaneous abscess vs cellulitis
26 yo female pmh migraines, lupus on prednisone 50mg, RA on Orencia (8/15 last infusion)p/w fevers and left sided submental abscess. Concern for staph aureus abscess in setting of diagnosed MRSA infection in her children. She also has a small lesion on left thigh. S/p I+D with ENT, appreciate procedure. Patient stable with no measured fevers, no leukocytosis. Immunocompromised due to treatment for SLE/RA. Overall improved since yesterday.  - Continue Vancomycin 1g q 12, trough before 4th dose  - F/U BCX  - Send wound culture to establish PO options  - ENT follow up      Joey Mcdonald MD  Pager [phone]  After 5pm and on weekends call [phone]
Statement: The Patient is a 27 year old female with past medical history of lupus recently on prednisone, R.A. on Orencia infusion (last given 8/15/17) presents to the emergency room at [facility] with a chief complaint of left neck pain ans swelling x 2 days. DDx submental cellulitis, plunging ranula, verus more probable submental abscess with surrounding cellulitis. Airway widely patent on laryngoscopy:    Plan:  1) Incision and drainage of submental abscess at bedside  2) packing can be removed on 08/24 in the evening  3) IV antibiotics

## 2017-08-24 NOTE — CHART NOTE - NSCHARTNOTEFT_GEN_A_CORE
[facility]  Head & Neck Surgery Procedure Note      Name:                  Aisha Vidal	              Surgeon:	Alberto Chahal MD  					  Date of Procedure: 08/23/2017                          Assistant:  None    Record Number:	[id number]                              Anesthesia:  Local Anesthesia       Preoperative diagnosis:	Dysphagia, unspecified (R13.10)  	  Postoperative diagnosis:	Dysphagia, unspecified (R13.10)    Procedure:		Flexible Fiberoptic Laryngoscopy  ([id number])    INDICATION:   The Patient is a 27 year old female with past medical history of lupus recently on prednisone, R.A. on Orencia infusion (last given 8/15/17) presents to the emergency room at [facility] with a chief complaint of left neck pain ans swelling x 2 days. Pt states swelling and pain is increasing nd is associated with subjective fevers, chills and generalize malaise. Pt attempted to express yesterday with slight clear drainage but no pain relief. Today, her neck has become increasingly painful, erythematic, and warm to touch. Pt notes trismus, slight dysphagia/pressure when she swallows, and slight difficulty breathing while laying supine. Pt has difficulty swallowing and occassional changes in voice. Patient denies shortness of breath.    PROCEDURE: The patient was seen and her bilateral nasal cavities were prepped and sprayed with topical anesthesia of neosynephrine.   Following this, a fiberoptic flexible laryngoscope was passed into the left nasal cavity, and then slowly and meticulously moved posteriorly to the nasopharynx.  There was no evidence of clotted blood within the left anterior and posterior superior lateral nasal wall. There was clear mucous within the nasal cavity.  Once at nasopharynx the skull base and lateral walls of the eustachian tubes were examined for lesions and masses.  There was no blood or masses within the nasopharynx. There was mild prominence of the nasopharyngeal soft tissue consistent with inflammatory reaction.  The fiberoptic endoscope was then carefully directed inferiorly and moved to the hypopharynx and glottis.  There was no fullness of the base of tongue.  There was 1-2+ prominence of the tonsils bilaterally (equally) and without exudate. There was no evidence of retropharyngeal erythema or edema.  There was mild  diffuse erythema  of the pharyngeal wall and supraglottic structure consistent with GERD.  The true vocal cords appeared to be mobile bilaterally.  There was no evidence of pooling of secretions, or obvious aspiration or penetration of liquid into the glottis.  The airway was widely patent. The patient tolerated the procedure well..

## 2017-08-24 NOTE — CONSULT NOTE ADULT - SUBJECTIVE AND OBJECTIVE BOX
· HPI Objective Statement: The Patient is a 27 year old female with past medical history of lupus recently on prednisone, R.A. on Orencia infusion (last given 8/15/17) presents to the emergency room at [facility] with a chief complaint of left neck pain ans swelling x 2 days. Pt states swelling and pain is increasing nd is associated with subjective fevers, chills and generalize malaise. Pt attempted to express yesterday with slight clear drainage but no pain relief. Today, her neck has become increasingly painful, erythematic, and warm to touch. Pt notes trismus, slight dysphagia/pressure when she swallows, and slight difficulty breathing while laying supine. Pt has difficulty swallowing and occassional changes in voice. Patient denies shortness of breath.  .	  · Presenting Symptoms: FEVER, PAIN	  · Negative Findings: no decreased eating/drinking, no nausea, no numbness, no vomiting, no weakness	  · Location: left neck	  · Radiation: none	  · Timing: none	  · Duration: day(s)	  · Severity: SEVERE	  · Aggravating Factors: palpation, eating, talking, swallowing	    HIV:   HIV Status:  · Offered: Declined	    PAST MEDICAL/SURGICAL/FAMILY/SOCIAL HISTORY:   Past Medical History:  Anemia    Asthma  No h/o intubation, dose not use ventolin on daily basis, denies hospitalization due to asthma  Chronic tonsillitis    Emphysema lung    Enlarged lymph nodes  dx: 2014  History of Clostridium difficile infection  2016, not an active infection  Lupus    Migraine    Multigravida    RA (rheumatoid arthritis)    Seronegative arthritis    Vitamin D deficiency.    Past Surgical History:  , legal    S/P arthroscopy of knee  2004.    Family History:  Mother  Still living? Yes, Estimated age: 51-60  Family history of thyroid disease, Age at diagnosis: Age Unknown     Father  Still living? Yes, Estimated age: 51-60  Family history of hyperlipidemia, Age at diagnosis: Age Unknown.    Tobacco Usage:  · Tobacco Usage	Never smoker	    ALLERGIES AND HOME MEDICATIONS:   Allergies:        Allergies:  	bee stings: Miscellaneous, Anaphylaxis, bee stings  	Demerol HCl: Drug, Rash  	morphine: Drug, Faint, Other (Moderate), Originally Entered as [Moderate Chest pain] reaction to [morphine]    Home Medications:   * Patient Currently Takes Medications as of 23-Aug-2017 14:35 documented in Prescription Writer  · 	Singulair 10 mg oral tablet: 1 tab(s) orally once a day  · 	Symbicort 80 mcg-4.5 mcg/inh inhalation aerosol: 2 puff(s) inhaled 2 times a day  · 	Ventolin HFA 90 mcg/inh inhalation aerosol: Last Dose Taken:  ,  inhaled prn  · 	Macrobid: Last Dose Taken:  , 50 milligram(s) orally prn  · 	mametosone: Last Dose Taken:  , 50 microgram(s) orally once a day  · 	Nasonex 50 mcg/inh nasal spray: Last Dose Taken:  , 2 spray(s) nasal once a day  · 	synthroid:     · 	plaquinal:     · 	topamax:     · 	Paxil: Last Dose Taken:  ,  orally   · 	hydroCHLOROthiazide: Last Dose Taken:  ,  orally     LABS  CBC Full  -  ( 23 Aug 2017 15:51 )  WBC Count : 5.9 K/uL  Hemoglobin : 13.6 g/dL  Hematocrit : 40.3 %  Platelet Count - Automated : 361 K/uL  Mean Cell Volume : 91.8 fl  Mean Cell Hemoglobin : 30.9 pg  Mean Cell Hemoglobin Concentration : 33.7 gm/dL  Auto Neutrophil # : 3.4 K/uL  Auto Lymphocyte # : 1.8 K/uL  Auto Monocyte # : 0.6 K/uL  Auto Eosinophil # : 0.0 K/uL  Auto Basophil # : 0.0 K/uL  Auto Neutrophil % : 57.7 %  Auto Lymphocyte % : 31.2 %  Auto Monocyte % : 9.9 %  Auto Eosinophil % : 0.5 %  Auto Basophil % : 0.6 %      -------------------------------------------------------------------------------------  Cat Scan of Neck (2017)  IMPRESSION: Cutaneous soft tissue thickening in 2 locations at the level of  the mandible on the left with underlying fat stranding. These may represent  small cutaneous abscesses or cellulitis.    No deep abscess collection identified.    AMBER MARTÍNEZ M.D., RADIOLOGY RESIDENT  This document has been electronically signed.  KARTHIK CARDENAS M.D., ATTENDING RADIOLOGIST  This document has been electronically signed  -------------------------------------------------------------

## 2017-08-24 NOTE — CONSULT NOTE ADULT - ENMT COMMENTS
Left neck sided wound, dysphagia
Flexible Fiberoptic Laryngoscopy: clear nasopharynx to glottis, true vocal cords mobile bilaterally, airway widely patent

## 2017-08-25 LAB
ANION GAP SERPL CALC-SCNC: 15 MMOL/L — SIGNIFICANT CHANGE UP (ref 5–17)
BUN SERPL-MCNC: 10 MG/DL — SIGNIFICANT CHANGE UP (ref 7–23)
CALCIUM SERPL-MCNC: 8.5 MG/DL — SIGNIFICANT CHANGE UP (ref 8.4–10.5)
CHLORIDE SERPL-SCNC: 102 MMOL/L — SIGNIFICANT CHANGE UP (ref 96–108)
CO2 SERPL-SCNC: 19 MMOL/L — LOW (ref 22–31)
CREAT SERPL-MCNC: 0.46 MG/DL — LOW (ref 0.5–1.3)
GLUCOSE SERPL-MCNC: 98 MG/DL — SIGNIFICANT CHANGE UP (ref 70–99)
HCT VFR BLD CALC: 34.9 % — SIGNIFICANT CHANGE UP (ref 34.5–45)
HGB BLD-MCNC: 11.4 G/DL — LOW (ref 11.5–15.5)
MCHC RBC-ENTMCNC: 29.1 PG — SIGNIFICANT CHANGE UP (ref 27–34)
MCHC RBC-ENTMCNC: 32.7 GM/DL — SIGNIFICANT CHANGE UP (ref 32–36)
MCV RBC AUTO: 89 FL — SIGNIFICANT CHANGE UP (ref 80–100)
PLATELET # BLD AUTO: 358 K/UL — SIGNIFICANT CHANGE UP (ref 150–400)
POTASSIUM SERPL-MCNC: 3.8 MMOL/L — SIGNIFICANT CHANGE UP (ref 3.5–5.3)
POTASSIUM SERPL-SCNC: 3.8 MMOL/L — SIGNIFICANT CHANGE UP (ref 3.5–5.3)
RBC # BLD: 3.92 M/UL — SIGNIFICANT CHANGE UP (ref 3.8–5.2)
RBC # FLD: 14.6 % — HIGH (ref 10.3–14.5)
SODIUM SERPL-SCNC: 136 MMOL/L — SIGNIFICANT CHANGE UP (ref 135–145)
VANCOMYCIN TROUGH SERPL-MCNC: 4.1 UG/ML — LOW (ref 10–20)
WBC # BLD: 3.12 K/UL — LOW (ref 3.8–10.5)
WBC # FLD AUTO: 3.12 K/UL — LOW (ref 3.8–10.5)

## 2017-08-25 PROCEDURE — 99232 SBSQ HOSP IP/OBS MODERATE 35: CPT

## 2017-08-25 RX ORDER — VANCOMYCIN HCL 1 G
1250 VIAL (EA) INTRAVENOUS EVERY 12 HOURS
Qty: 0 | Refills: 0 | Status: DISCONTINUED | OUTPATIENT
Start: 2017-08-25 | End: 2017-08-26

## 2017-08-25 RX ORDER — OXYCODONE HYDROCHLORIDE 5 MG/1
5 TABLET ORAL ONCE
Qty: 0 | Refills: 0 | Status: DISCONTINUED | OUTPATIENT
Start: 2017-08-25 | End: 2017-08-25

## 2017-08-25 RX ADMIN — Medication 650 MILLIGRAM(S): at 14:23

## 2017-08-25 RX ADMIN — PANTOPRAZOLE SODIUM 40 MILLIGRAM(S): 20 TABLET, DELAYED RELEASE ORAL at 06:33

## 2017-08-25 RX ADMIN — Medication 1 MILLIGRAM(S): at 14:23

## 2017-08-25 RX ADMIN — MONTELUKAST 10 MILLIGRAM(S): 4 TABLET, CHEWABLE ORAL at 14:23

## 2017-08-25 RX ADMIN — Medication 50 MILLIGRAM(S): at 17:36

## 2017-08-25 RX ADMIN — BUDESONIDE AND FORMOTEROL FUMARATE DIHYDRATE 2 PUFF(S): 160; 4.5 AEROSOL RESPIRATORY (INHALATION) at 06:48

## 2017-08-25 RX ADMIN — OXYCODONE HYDROCHLORIDE 5 MILLIGRAM(S): 5 TABLET ORAL at 03:44

## 2017-08-25 RX ADMIN — Medication 166.67 MILLIGRAM(S): at 17:46

## 2017-08-25 RX ADMIN — Medication 650 MILLIGRAM(S): at 15:23

## 2017-08-25 RX ADMIN — Medication 200 MILLIGRAM(S): at 17:36

## 2017-08-25 RX ADMIN — Medication 50 MICROGRAM(S): at 06:33

## 2017-08-25 RX ADMIN — Medication 50 MILLIGRAM(S): at 06:33

## 2017-08-25 RX ADMIN — BUDESONIDE AND FORMOTEROL FUMARATE DIHYDRATE 2 PUFF(S): 160; 4.5 AEROSOL RESPIRATORY (INHALATION) at 17:36

## 2017-08-25 RX ADMIN — Medication 200 MILLIGRAM(S): at 08:35

## 2017-08-25 RX ADMIN — Medication 12.5 MILLIGRAM(S): at 06:33

## 2017-08-25 RX ADMIN — Medication 10 MILLIGRAM(S): at 14:23

## 2017-08-25 RX ADMIN — OXYCODONE HYDROCHLORIDE 5 MILLIGRAM(S): 5 TABLET ORAL at 04:44

## 2017-08-25 RX ADMIN — Medication 1 SPRAY(S): at 14:22

## 2017-08-25 RX ADMIN — Medication 250 MILLIGRAM(S): at 06:48

## 2017-08-25 NOTE — PROVIDER CONTACT NOTE (OTHER) - ACTION/TREATMENT ORDERED:
NP Janett Doyle aware and stated to given next Vanco IVPB dose as ordered.  No new orders at this time.

## 2017-08-25 NOTE — PROGRESS NOTE ADULT - ASSESSMENT
27 year old female with submental abscess with surrounding cellulitis s/p Incision and drainage, improving.    - Continue IV Vancomycin as per ID  - f/u wound cultures  - no need for further packing, can change gauze daily or as needed  - please milk wound 3X daily after being given pain medicine  - Warm compresses as needed  - continue care per primary team   - If D/C home, can followup with Dr. Chahal [phone] or ENT Clinic at [facility] [phone]

## 2017-08-25 NOTE — PROGRESS NOTE ADULT - SUBJECTIVE AND OBJECTIVE BOX
POD/STATUS POST/ENT ISSUE: s/p Incision and drainage of submental abcess    INTERVAL HPI: Patient seen/examined at bedside. Doing well, no acute events overnight. Patient's packing removed last night, pain well improved. Tolerating regular diet. No drooling, denies drainage from wound. Denies fevers/chills.    Vital Signs Last 24 Hrs  T(C): 36.9 (25 Aug 2017 00:05), Max: 37.4 (24 Aug 2017 07:13)  T(F): 98.5 (25 Aug 2017 00:05), Max: 99.3 (24 Aug 2017 07:13)  HR: 78 (25 Aug 2017 06:30) (75 - 94)  BP: 100/68 (25 Aug 2017 06:30) (98/59 - 120/75)  BP(mean): --  RR: 18 (25 Aug 2017 06:30) (18 - 18)  SpO2: 99% (25 Aug 2017 06:30) (97% - 99%)    PHYSICAL EXAM:  Gen: NAD, well-developed  Head: Normocephalic, Atraumatic  Eyes: PERRL, EOMI, no scleral injection  Nose: Nares bilaterally patent, no discharge  Mouth: Mucosa moist, tongue/uvula midline, oropharynx clear  Neck: Flat, supple, Submental wound - no purulent drainage, minimal tenderness, erythema improved.  Resp: breathing easily, no stridor  CV: no peripheral edema/cyanosis    LABS:                        13.6   5.9   )-----------( 361      ( 23 Aug 2017 15:51 )             40.3       IMAGING/ADDITIONAL STUDIES: POD/STATUS POST/ENT ISSUE: s/p Incision and drainage of submental abcess    INTERVAL HPI: Patient seen/examined at bedside. Doing well, no acute events overnight. Patient's packing removed last night, pain well improved. Tolerating regular diet. No drooling, denies drainage from wound. Denies fevers/chills.    Vital Signs Last 24 Hrs  T(C): 36.9 (25 Aug 2017 00:05), Max: 37.4 (24 Aug 2017 07:13)  T(F): 98.5 (25 Aug 2017 00:05), Max: 99.3 (24 Aug 2017 07:13)  HR: 78 (25 Aug 2017 06:30) (75 - 94)  BP: 100/68 (25 Aug 2017 06:30) (98/59 - 120/75)  BP(mean): --  RR: 18 (25 Aug 2017 06:30) (18 - 18)  SpO2: 99% (25 Aug 2017 06:30) (97% - 99%)    PHYSICAL EXAM:  Gen: NAD, well-developed  Head: Normocephalic, Atraumatic  Eyes: PERRL, EOMI, no scleral injection  Nose: Nares bilaterally patent, no discharge  Mouth: Mucosa moist, tongue/uvula midline, oropharynx clear. No floor of mouth tenderness, no swelling or pain on palpation.  Neck: Flat, supple, Submental wound - + mild tenderness, erythema improved, + mild 2cc of purulent drainage.  Resp: breathing easily, no stridor  CV: no peripheral edema/cyanosis    LABS:                        13.6   5.9   )-----------( 361      ( 23 Aug 2017 15:51 )             40.3       IMAGING/ADDITIONAL STUDIES:

## 2017-08-25 NOTE — PROGRESS NOTE ADULT - ASSESSMENT
26 yo female pmh migraines, lupus on prednisone 50mg, RA on Orencia (8/15 last infusion)p/w fevers and left sided submental abscess. Concern for staph aureus abscess in setting of diagnosed MRSA infection in her children. She also has a small lesion on left thigh. S/p I+D with ENT, appreciate procedure. Patient stable with no measured fevers, no leukocytosis. Immunocompromised due to treatment for SLE/RA. Improving overall.  - Continue Vancomycin 1250 q 12 (increased for low trough)  - If planning for discharge over weekend, can consider sending on Linezolid 600mg po q 12 (would be expensive); unless wound culture available--then can send on doxycycline or bactrim if sensitive (abx course 7-10 days)  - F/U BCX  - F/U wound culture  - Over weekend call [phone] with questions      Joey Mcdonald MD  Pager [phone]  After 5pm and on weekends call [phone]

## 2017-08-25 NOTE — PROGRESS NOTE ADULT - ASSESSMENT
27 year old female with submental abscess with surrounding cellulitis s/p Incision and drainage.  - Continue IV Vancomycin as per ID  - f/u wound cultures  - no need for further packing, can change gauze daily or as needed.   - continue care per primary team 27 year old female with submental abscess with surrounding cellulitis s/p Incision and drainage.  - Continue IV Vancomycin as per ID  - f/u wound cultures  - no need for further packing, can change gauze daily or as needed  - please milk wound 3X daily after being given pain medicine  - continue care per primary team 27 year old female with submental abscess with surrounding cellulitis s/p Incision and drainage.  - Continue IV Vancomycin as per ID  - f/u wound cultures  - consider broadening IV antibiotic coverage while we await culture results  - no need for further packing, can change gauze daily or as needed  - please milk wound 3X daily after being given pain medicine  - continue care per primary team

## 2017-08-25 NOTE — PROGRESS NOTE ADULT - SUBJECTIVE AND OBJECTIVE BOX
chief complaint : fever, chin  swelling     SUBJECTIVE / OVERNIGHT EVENTS:pt says her shin feels less swollen     MEDICATIONS  (STANDING):  topiramate 50 milliGRAM(s) Oral two times a day  hydroxychloroquine 200 milliGRAM(s) Oral two times a day with meals  PARoxetine 10 milliGRAM(s) Oral daily  buDESOnide 160 MICROgram(s)/formoterol 4.5 MICROgram(s) Inhaler 2 Puff(s) Inhalation two times a day  pantoprazole    Tablet 40 milliGRAM(s) Oral before breakfast  montelukast 10 milliGRAM(s) Oral daily  fluticasone propionate 50 MICROgram(s)/spray Nasal Spray 1 Spray(s) Both Nostrils daily  levothyroxine 50 MICROGram(s) Oral daily  folic acid 1 milliGRAM(s) Oral daily  hydrochlorothiazide 12.5 milliGRAM(s) Oral daily  vancomycin  IVPB 1250 milliGRAM(s) IV Intermittent every 12 hours    MEDICATIONS  (PRN):  acetaminophen   Tablet. 650 milliGRAM(s) Oral every 6 hours PRN Mild Pain (1 - 3)  ALBUTerol    90 MICROgram(s) HFA Inhaler 2 Puff(s) Inhalation four times a day PRN Bronchospasm    Vital Signs Last 24 Hrs  T(C): 36.6 (25 Aug 2017 15:43), Max: 36.9 (25 Aug 2017 00:05)  T(F): 97.8 (25 Aug 2017 15:43), Max: 98.5 (25 Aug 2017 00:05)  HR: 95 (25 Aug 2017 15:43) (78 - 95)  BP: 96/59 (25 Aug 2017 15:43) (96/59 - 100/68)  BP(mean): --  RR: 18 (25 Aug 2017 15:43) (18 - 18)  SpO2: 97% (25 Aug 2017 15:43) (97% - 99%)    Constitutional: No fever, fatigue  Skin: No rash.  Eyes: No recent vision problems or eye pain.  ENT: No congestion, ear pain, or sore throat.  Cardiovascular: No chest pain or palpation.  Respiratory: No cough, shortness of breath, congestion, or wheezing.  Gastrointestinal: No abdominal pain, nausea, vomiting, or diarrhea.  Genitourinary: No dysuria.  Musculoskeletal: No joint swelling.  Neurologic: No headache.    PHYSICAL EXAM:  GENERAL: NAD  EYES: EOMI, PERRLA  NECK: Supple, No JVD, left submental area - dec swelling / dressing +  CHEST/LUNG:   HEART:  S1 , S2 +  ABDOMEN: soft, bs+  EXTREMITIES:  no edema  NEUROLOGY:alert awake oriented    LABS:      136  |  102  |  10  ----------------------------<  98  3.8   |  19<L>  |  0.46<L>    Ca    8.5      25 Aug 2017 07:19      Creatinine Trend: 0.46 <--, 0.61 <--, 0.64 <--, 0.55 <--                        11.4   3.12  )-----------( 358      ( 25 Aug 2017 07:22 )             34.9     Urine Studies:  Urinalysis Basic - ( 19 Aug 2017 14:14 )    Color: Yellow / Appearance: Clear / S.024 / pH:   Gluc:  / Ketone: Negative  / Bili: Negative / Urobili: Negative   Blood:  / Protein: Trace / Nitrite: Negative   Leuk Esterase: Small / RBC: 0-2 /HPF / WBC 3-5 /HPF   Sq Epi:  / Non Sq Epi: Few /HPF / Bacteria: Few /HPF

## 2017-08-25 NOTE — CHART NOTE - NSCHARTNOTEFT_GEN_A_CORE
MAAME CROUCH  27y Female  Patient is a 27y old  Female who presents with a chief complaint of worsening under the chin wound associated with intermittent dysphagia (23 Aug 2017 21:23)    DX:  Submental Cellulitis      Event Summary:  -Notified by RN, Patient w/ Vancomycin through level = 4.1.    Interventions:  -Vancomycin increased to 1250mg IV q12.   -f/u repeat through level   -ID on board f/u with team  -Endorsed to day team         ESTUARDO Carrington-BC  Medicine Department  #[phone]

## 2017-08-25 NOTE — PROGRESS NOTE ADULT - SUBJECTIVE AND OBJECTIVE BOX
CC: F/U for abscess     Saw/spoke to patient. No fevers, no chills. Still had drainage from abscess overnight. Pain to palpation. Decreasing redness.    Allergies  bee stings (Anaphylaxis)  Demerol HCl (Rash)  morphine (Faint; Other (Moderate))    ANTIMICROBIALS:  hydroxychloroquine 200 two times a day with meals  vancomycin  IVPB 1250 every 12 hours    PE:    Vital Signs Last 24 Hrs  T(C): 36.6 (25 Aug 2017 08:11), Max: 36.9 (25 Aug 2017 00:05)  T(F): 97.9 (25 Aug 2017 08:11), Max: 98.5 (25 Aug 2017 00:05)  HR: 92 (25 Aug 2017 08:11) (78 - 94)  BP: 96/67 (25 Aug 2017 08:11) (96/67 - 100/68)  BP(mean): --  RR: 18 (25 Aug 2017 08:11) (18 - 18)  SpO2: 98% (25 Aug 2017 08:11) (97% - 99%)    Gen: AOx3, NAD, non-toxic, pleasant  HEENT: L sided wound to neck, decreasing surrounding redness, mild bloody drainage on dressing  CV: S1+S2 normal, no murmurs, nontachycardic  Resp: Clear bilat, no resp distress, no crackles/wheezes  Abd: Soft, nontender, +BS  Ext: No LE edema, no wounds    LABS:                        11.4   3.12  )-----------( 358      ( 25 Aug 2017 07:22 )             34.9     08-25    136  |  102  |  10  ----------------------------<  98  3.8   |  19<L>  |  0.46<L>    Ca    8.5      25 Aug 2017 07:19    TPro  7.9  /  Alb  4.5  /  TBili  0.3  /  DBili  x   /  AST  14  /  ALT  12  /  AlkPhos  75  08-23    MICROBIOLOGY:  Vancomycin Level, Trough: 4.1 ug/mL (08-25-17 @ 05:48)    8/23 BCX x3 NGTD    Wound culture Pending    RADIOLOGY:    No new available

## 2017-08-25 NOTE — PROGRESS NOTE ADULT - SUBJECTIVE AND OBJECTIVE BOX
POD/STATUS POST/ENT ISSUE: s/p Incision and drainage of submental abscess    INTERVAL HPI: Patient seen/examined at bedside. Doing well, no acute events today. Patient's packing removed last night. Pt. states she feels that both the size and pain are well improved. Tolerating regular diet. No drooling, denies drainage from wound. Denies fevers/chills.    Vital Signs Last 24 Hrs  T(C): 36.6 (25 Aug 2017 15:43), Max: 36.9 (25 Aug 2017 00:05)  T(F): 97.8 (25 Aug 2017 15:43), Max: 98.5 (25 Aug 2017 00:05)  HR: 95 (25 Aug 2017 15:43) (78 - 95)  BP: 96/59 (25 Aug 2017 15:43) (96/59 - 100/68)  BP(mean): --  RR: 18 (25 Aug 2017 15:43) (18 - 18)  SpO2: 97% (25 Aug 2017 15:43) (97% - 99%)    LABS:                        11.4   3.12  )-----------( 358      ( 25 Aug 2017 07:22 )             34.9       PHYSICAL EXAM:  Gen: NAD, well-developed  Head: Normocephalic, Atraumatic  Face: no edema/erythema/fluctuance, parotid glands soft without mass  Eyes: PERRL, EOMI, no scleral injection  Nose: Nares bilaterally patent, no discharge  Mouth: No Stridor / Drooling / Trismus.  Mucosa moist, tongue/uvula midline, oropharynx clear  Neck: Flat, supple, Submental wound - + mild tenderness, erythema improved.  No discharge.  Resp: breathing easily, no stridor  CV: no peripheral edema/cyanosis

## 2017-08-26 ENCOUNTER — TRANSCRIPTION ENCOUNTER (OUTPATIENT)
Age: 27
End: 2017-08-26

## 2017-08-26 VITALS
DIASTOLIC BLOOD PRESSURE: 73 MMHG | OXYGEN SATURATION: 97 % | TEMPERATURE: 98 F | HEART RATE: 80 BPM | RESPIRATION RATE: 18 BRPM | SYSTOLIC BLOOD PRESSURE: 107 MMHG

## 2017-08-26 LAB
-  AMPICILLIN/SULBACTAM: SIGNIFICANT CHANGE UP
-  CEFAZOLIN: SIGNIFICANT CHANGE UP
-  CIPROFLOXACIN: SIGNIFICANT CHANGE UP
-  CLINDAMYCIN: SIGNIFICANT CHANGE UP
-  DAPTOMYCIN: SIGNIFICANT CHANGE UP
-  ERYTHROMYCIN: SIGNIFICANT CHANGE UP
-  GENTAMICIN: SIGNIFICANT CHANGE UP
-  LEVOFLOXACIN: SIGNIFICANT CHANGE UP
-  LINEZOLID: SIGNIFICANT CHANGE UP
-  MOXIFLOXACIN(AEROBIC): SIGNIFICANT CHANGE UP
-  OXACILLIN: SIGNIFICANT CHANGE UP
-  PENICILLIN: SIGNIFICANT CHANGE UP
-  RIFAMPIN: SIGNIFICANT CHANGE UP
-  TETRACYCLINE: SIGNIFICANT CHANGE UP
-  TRIMETHOPRIM/SULFAMETHOXAZOLE: SIGNIFICANT CHANGE UP
-  VANCOMYCIN: SIGNIFICANT CHANGE UP
HCT VFR BLD CALC: 42.1 % — SIGNIFICANT CHANGE UP (ref 34.5–45)
HGB BLD-MCNC: 13.6 G/DL — SIGNIFICANT CHANGE UP (ref 11.5–15.5)
MCHC RBC-ENTMCNC: 29 PG — SIGNIFICANT CHANGE UP (ref 27–34)
MCHC RBC-ENTMCNC: 32.3 GM/DL — SIGNIFICANT CHANGE UP (ref 32–36)
MCV RBC AUTO: 89.8 FL — SIGNIFICANT CHANGE UP (ref 80–100)
METHOD TYPE: SIGNIFICANT CHANGE UP
PLATELET # BLD AUTO: 418 K/UL — HIGH (ref 150–400)
RBC # BLD: 4.69 M/UL — SIGNIFICANT CHANGE UP (ref 3.8–5.2)
RBC # FLD: 14.4 % — SIGNIFICANT CHANGE UP (ref 10.3–14.5)
WBC # BLD: 3.59 K/UL — LOW (ref 3.8–10.5)
WBC # FLD AUTO: 3.59 K/UL — LOW (ref 3.8–10.5)

## 2017-08-26 PROCEDURE — 94640 AIRWAY INHALATION TREATMENT: CPT

## 2017-08-26 PROCEDURE — 70491 CT SOFT TISSUE NECK W/DYE: CPT

## 2017-08-26 PROCEDURE — 80053 COMPREHEN METABOLIC PANEL: CPT

## 2017-08-26 PROCEDURE — 96375 TX/PRO/DX INJ NEW DRUG ADDON: CPT

## 2017-08-26 PROCEDURE — 80202 ASSAY OF VANCOMYCIN: CPT

## 2017-08-26 PROCEDURE — 87040 BLOOD CULTURE FOR BACTERIA: CPT

## 2017-08-26 PROCEDURE — 87186 SC STD MICRODIL/AGAR DIL: CPT

## 2017-08-26 PROCEDURE — 80048 BASIC METABOLIC PNL TOTAL CA: CPT

## 2017-08-26 PROCEDURE — 96374 THER/PROPH/DIAG INJ IV PUSH: CPT

## 2017-08-26 PROCEDURE — 85027 COMPLETE CBC AUTOMATED: CPT

## 2017-08-26 PROCEDURE — 99285 EMERGENCY DEPT VISIT HI MDM: CPT | Mod: 25

## 2017-08-26 PROCEDURE — 87070 CULTURE OTHR SPECIMN AEROBIC: CPT

## 2017-08-26 RX ORDER — ACETAMINOPHEN 500 MG
2 TABLET ORAL
Qty: 0 | Refills: 0 | COMMUNITY
Start: 2017-08-26

## 2017-08-26 RX ADMIN — PANTOPRAZOLE SODIUM 40 MILLIGRAM(S): 20 TABLET, DELAYED RELEASE ORAL at 05:17

## 2017-08-26 RX ADMIN — BUDESONIDE AND FORMOTEROL FUMARATE DIHYDRATE 2 PUFF(S): 160; 4.5 AEROSOL RESPIRATORY (INHALATION) at 05:16

## 2017-08-26 RX ADMIN — Medication 200 MILLIGRAM(S): at 08:47

## 2017-08-26 RX ADMIN — Medication 1 SPRAY(S): at 11:13

## 2017-08-26 RX ADMIN — Medication 10 MILLIGRAM(S): at 11:13

## 2017-08-26 RX ADMIN — Medication 50 MILLIGRAM(S): at 05:16

## 2017-08-26 RX ADMIN — Medication 50 MICROGRAM(S): at 05:16

## 2017-08-26 RX ADMIN — MONTELUKAST 10 MILLIGRAM(S): 4 TABLET, CHEWABLE ORAL at 11:13

## 2017-08-26 RX ADMIN — Medication 166.67 MILLIGRAM(S): at 05:17

## 2017-08-26 RX ADMIN — Medication 1 MILLIGRAM(S): at 11:13

## 2017-08-26 RX ADMIN — Medication 12.5 MILLIGRAM(S): at 05:16

## 2017-08-26 NOTE — DISCHARGE NOTE ADULT - PLAN OF CARE
Resolution of infection Diet and activity as noted  Continue medications as prescribed  Return for any worsening symptoms

## 2017-08-26 NOTE — PROGRESS NOTE ADULT - SUBJECTIVE AND OBJECTIVE BOX
POD/STATUS POST/ENT ISSUE: s/p Incision and drainage of submental abscess    INTERVAL HPI: Patient seen/examined at bedside. Doing well, no acute events today. Pt.notes improvement in facial pain and swelling, tolerating regular diet no voice changes or foul taste in mouth denies any drooling, denies drainage from wound. Denies fevers/chills.        Vital Signs Last 24 Hrs  T(C): 36.8 (26 Aug 2017 07:15), Max: 36.8 (26 Aug 2017 07:15)  T(F): 98.3 (26 Aug 2017 07:15), Max: 98.3 (26 Aug 2017 07:15)  HR: 80 (26 Aug 2017 07:15) (69 - 95)  BP: 107/73 (26 Aug 2017 07:15) (96/59 - 108/72)  RR: 18 (26 Aug 2017 07:15) (18 - 18)  SpO2: 97% (26 Aug 2017 07:15) (97% - 99%)    PHYSICAL EXAM:  Gen: NAD, well-developed speech fluent, no stridor  Head: Normocephalic, Atraumatic  Eyes: PERRL, EOMI, no scleral injection  Nose: Nares bilaterally patent, no discharge  Mouth: Mucosa moist,  FOM soft no sublingual swelling salivary ducts intact no obvious drainage tongue/uvula midline, oropharynx clear no intra-oral tenderness or masses  Neck: s/p I&D of submental abscess, with scant drainage no bleeding or overlying erythema or induration. No anterior neck swelling neck landmarks visible  Flat, supple, no lymphadenopathy, trachea midline, no masses  Resp: breathing easily, no stridor      LABS:                        11.4   3.12  )-----------( 358      ( 25 Aug 2017 07:22 )             34.9

## 2017-08-26 NOTE — PROGRESS NOTE ADULT - PROBLEM SELECTOR PLAN 1
cont iv vanco  f.u cx  ID input appreciated  poss dc tomorrow
cont iv vanco  f.u cx  ID input appreciated
- Continue ABX per ID reccs  - f/u wound cultures  - no need for further packing, can change gauze daily or as needed  - please massage wound 3X daily after being given pain medicine  - Warm compresses as needed  - continue care per primary team   - If D/C home, can followup with Dr. Chahal [phone] or ENT Clinic at [facility] [phone]  - Clear for D/C from ENT perspective

## 2017-08-26 NOTE — DISCHARGE NOTE ADULT - CARE PROVIDER_API CALL
Garth Ordaz), Internal Medicine; Pulmonary Disease  [address] [place] [address]  Phone: [phone]  Fax: [phone]

## 2017-08-26 NOTE — DISCHARGE NOTE ADULT - CARE PLAN
Principal Discharge DX:	Cellulitis and abscess  Goal:	Resolution of infection  Instructions for follow-up, activity and diet:	Diet and activity as noted  Continue medications as prescribed  Return for any worsening symptoms

## 2017-08-26 NOTE — DISCHARGE NOTE ADULT - PATIENT PORTAL LINK FT
“You can access the [facility] Patient Portal, offered by [facility], by registering with the following website: [email or web address]”

## 2017-08-26 NOTE — DISCHARGE NOTE ADULT - MEDICATION SUMMARY - MEDICATIONS TO TAKE
I will START or STAY ON the medications listed below when I get home from the hospital:    SUMAtriptan 6 mg/0.5 mL subcutaneous solution  --  subcutaneous , As Needed  -- Indication: For Migraine    acetaminophen 325 mg oral tablet  -- 2 tab(s) by mouth every 6 hours, As needed, Mild Pain (1 - 3)  -- Indication: For Pain    Topamax 25 mg oral tablet  -- 2 tab(s) by mouth 2 times a day  -- Indication: For Migraine prophylaxis    Paxil 10 mg oral tablet  -- 1 tab(s) by mouth once a day  -- Indication: For Anxiety/Depression    Plaquenil 200 mg oral tablet  -- 1 tab(s) by mouth 2 times a day  -- Indication: For Anti-infective    doxycycline monohydrate 100 mg oral capsule  -- 1 cap(s) by mouth 2 times a day MDD:2  -- Indication: For Cellulitis    Symbicort 160 mcg-4.5 mcg/inh inhalation aerosol  -- 1 puff(s) inhaled once a day  -- Indication: For Asthma    Ventolin HFA 90 mcg/inh inhalation aerosol  -- 2 puff(s) inhaled every 4 to 6 hours, As Needed  -- Indication: For Asthma    hydroCHLOROthiazide 12.5 mg oral capsule  -- 1 cap(s) by mouth once a day  -- Indication: For HTN    Singulair 10 mg oral tablet  -- 1 tab(s) by mouth once a day  -- Indication: For Asthma    Flonase 50 mcg/inh nasal spray  -- 1 spray(s) into nose once a day  -- Indication: For Rhinitis    ocular lubricant ophthalmic solution  -- 1 drop(s) to each affected eye once a day  -- Indication: For Dry eyeys    omeprazole 40 mg oral delayed release capsule  -- 1 cap(s) by mouth once a day, As Needed  -- Indication: For Need for prophylactic measure    Synthroid 50 mcg (0.05 mg) oral tablet  -- 1 tab(s) by mouth once a day  -- Indication: For Hypothyroid    Vitamin D3 1000 intl units oral tablet  -- 1 tab(s) by mouth 2 times a day  -- Indication: For Nutritional supplement    folic acid 1 mg oral tablet  -- 1 tab(s) by mouth once a day  -- Indication: For Nutritional supplement

## 2017-08-26 NOTE — DISCHARGE NOTE ADULT - HOSPITAL COURSE
To be completed by MD 28yo female pmh migraines, lupus on prednisone 50mg, RA on Orencia (8/15 last infusion)p/w fevers and left sided submental abscess 2d ago increasing in size. +clear drainage when expressed.  Dx: Cellulitis     evaluated by ID ,was given iv vanco and dced with po doxy as per ID recommendation

## 2017-08-28 LAB
CULTURE RESULTS: SIGNIFICANT CHANGE UP
SPECIMEN SOURCE: SIGNIFICANT CHANGE UP

## 2017-08-29 ENCOUNTER — RX RENEWAL (OUTPATIENT)
Age: 27
End: 2017-08-29

## 2017-08-29 LAB
CULTURE RESULTS: SIGNIFICANT CHANGE UP
ORGANISM # SPEC MICROSCOPIC CNT: SIGNIFICANT CHANGE UP
ORGANISM # SPEC MICROSCOPIC CNT: SIGNIFICANT CHANGE UP
SPECIMEN SOURCE: SIGNIFICANT CHANGE UP

## 2017-08-31 ENCOUNTER — APPOINTMENT (OUTPATIENT)
Dept: PAIN MANAGEMENT | Facility: CLINIC | Age: 27
End: 2017-08-31
Payer: COMMERCIAL

## 2017-08-31 VITALS
HEART RATE: 77 BPM | BODY MASS INDEX: 32.96 KG/M2 | WEIGHT: 210 LBS | SYSTOLIC BLOOD PRESSURE: 112 MMHG | HEIGHT: 67 IN | DIASTOLIC BLOOD PRESSURE: 75 MMHG

## 2017-08-31 PROCEDURE — 99213 OFFICE O/P EST LOW 20 MIN: CPT

## 2017-08-31 RX ORDER — METOCLOPRAMIDE HYDROCHLORIDE 10 MG/1
10 TABLET, ORALLY DISINTEGRATING ORAL
Qty: 10 | Refills: 0 | Status: DISCONTINUED | COMMUNITY
Start: 2017-08-21 | End: 2017-08-31

## 2017-09-05 ENCOUNTER — APPOINTMENT (OUTPATIENT)
Dept: RHEUMATOLOGY | Facility: CLINIC | Age: 27
End: 2017-09-05

## 2017-10-10 ENCOUNTER — APPOINTMENT (OUTPATIENT)
Dept: RHEUMATOLOGY | Facility: CLINIC | Age: 27
End: 2017-10-10

## 2017-10-11 ENCOUNTER — APPOINTMENT (OUTPATIENT)
Dept: RHEUMATOLOGY | Facility: CLINIC | Age: 27
End: 2017-10-11
Payer: COMMERCIAL

## 2017-10-11 PROCEDURE — 99215 OFFICE O/P EST HI 40 MIN: CPT

## 2017-10-11 RX ORDER — PROPRANOLOL HYDROCHLORIDE 10 MG/1
10 TABLET ORAL TWICE DAILY
Qty: 20 | Refills: 0 | Status: DISCONTINUED | COMMUNITY
Start: 2017-06-28 | End: 2017-10-11

## 2017-10-11 RX ORDER — DOXYCYCLINE HYCLATE 100 MG
100 CAPSULE ORAL
Refills: 0 | Status: DISCONTINUED | COMMUNITY
End: 2017-10-11

## 2017-10-11 RX ORDER — MULTIVIT WITH CALCIUM,IRON,MIN
TABLET ORAL DAILY
Qty: 1 | Refills: 1 | Status: DISCONTINUED | COMMUNITY
Start: 2017-01-10 | End: 2017-10-11

## 2017-10-11 RX ORDER — ZOLMITRIPTAN 2.5 MG/1
2.5 SPRAY, METERED NASAL
Qty: 2 | Refills: 1 | Status: DISCONTINUED | COMMUNITY
Start: 2017-06-28 | End: 2017-10-11

## 2017-10-12 ENCOUNTER — LABORATORY RESULT (OUTPATIENT)
Age: 27
End: 2017-10-12

## 2017-10-12 LAB
25(OH)D3 SERPL-MCNC: 28.1 NG/ML
ALBUMIN SERPL ELPH-MCNC: 4.3 G/DL
ALP BLD-CCNC: 83 U/L
ALT SERPL-CCNC: 14 U/L
ANION GAP SERPL CALC-SCNC: 14 MMOL/L
AST SERPL-CCNC: 16 U/L
BASOPHILS # BLD AUTO: 0.03 K/UL
BASOPHILS NFR BLD AUTO: 0.9 %
BILIRUB SERPL-MCNC: 0.4 MG/DL
BUN SERPL-MCNC: 13 MG/DL
CALCIUM SERPL-MCNC: 9.8 MG/DL
CHLORIDE SERPL-SCNC: 104 MMOL/L
CO2 SERPL-SCNC: 22 MMOL/L
CREAT SERPL-MCNC: 0.72 MG/DL
CRP SERPL-MCNC: 1.7 MG/DL
EOSINOPHIL # BLD AUTO: 0.09 K/UL
EOSINOPHIL NFR BLD AUTO: 2.6 %
ERYTHROCYTE [SEDIMENTATION RATE] IN BLOOD BY WESTERGREN METHOD: 26 MM/HR
GLUCOSE SERPL-MCNC: 108 MG/DL
HCT VFR BLD CALC: 39.4 %
HGB BLD-MCNC: 13 G/DL
IMM GRANULOCYTES NFR BLD AUTO: 0 %
LYMPHOCYTES # BLD AUTO: 1.46 K/UL
LYMPHOCYTES NFR BLD AUTO: 41.6 %
MAN DIFF?: NORMAL
MCHC RBC-ENTMCNC: 28.5 PG
MCHC RBC-ENTMCNC: 33 GM/DL
MCV RBC AUTO: 86.4 FL
MONOCYTES # BLD AUTO: 0.5 K/UL
MONOCYTES NFR BLD AUTO: 14.2 %
NEUTROPHILS # BLD AUTO: 1.43 K/UL
NEUTROPHILS NFR BLD AUTO: 40.7 %
PLATELET # BLD AUTO: 357 K/UL
POTASSIUM SERPL-SCNC: 4.1 MMOL/L
PROT SERPL-MCNC: 8 G/DL
RBC # BLD: 4.56 M/UL
RBC # FLD: 14.3 %
SODIUM SERPL-SCNC: 140 MMOL/L
TSH SERPL-ACNC: 1.56 UIU/ML
WBC # FLD AUTO: 3.51 K/UL

## 2017-10-13 LAB
APPEARANCE: ABNORMAL
BILIRUBIN URINE: NEGATIVE
BLOOD URINE: NEGATIVE
COLOR: YELLOW
GLUCOSE QUALITATIVE U: NEGATIVE MG/DL
KETONES URINE: NEGATIVE
LEUKOCYTE ESTERASE URINE: NEGATIVE
NITRITE URINE: NEGATIVE
PH URINE: 7
PROTEIN URINE: ABNORMAL MG/DL
SPECIFIC GRAVITY URINE: 1.02
UROBILINOGEN URINE: NEGATIVE MG/DL

## 2017-10-18 ENCOUNTER — APPOINTMENT (OUTPATIENT)
Dept: OTOLARYNGOLOGY | Facility: CLINIC | Age: 27
End: 2017-10-18
Payer: COMMERCIAL

## 2017-10-18 VITALS
BODY MASS INDEX: 31.39 KG/M2 | DIASTOLIC BLOOD PRESSURE: 75 MMHG | HEIGHT: 67 IN | SYSTOLIC BLOOD PRESSURE: 112 MMHG | WEIGHT: 200 LBS | RESPIRATION RATE: 18 BRPM | HEART RATE: 77 BPM

## 2017-10-18 PROCEDURE — 99213 OFFICE O/P EST LOW 20 MIN: CPT

## 2017-10-19 ENCOUNTER — RX RENEWAL (OUTPATIENT)
Age: 27
End: 2017-10-19

## 2017-10-20 ENCOUNTER — APPOINTMENT (OUTPATIENT)
Dept: OPHTHALMOLOGY | Facility: CLINIC | Age: 27
End: 2017-10-20

## 2017-10-23 ENCOUNTER — APPOINTMENT (OUTPATIENT)
Dept: ALLERGY | Facility: CLINIC | Age: 27
End: 2017-10-23
Payer: COMMERCIAL

## 2017-10-23 VITALS
WEIGHT: 206 LBS | BODY MASS INDEX: 32.33 KG/M2 | RESPIRATION RATE: 16 BRPM | DIASTOLIC BLOOD PRESSURE: 80 MMHG | SYSTOLIC BLOOD PRESSURE: 120 MMHG | HEIGHT: 67 IN | HEART RATE: 72 BPM

## 2017-10-23 PROCEDURE — 99244 OFF/OP CNSLTJ NEW/EST MOD 40: CPT | Mod: 25

## 2017-10-23 PROCEDURE — 95018 ALL TSTG PERQ&IQ DRUGS/BIOL: CPT

## 2017-10-23 PROCEDURE — 95004 PERQ TESTS W/ALRGNC XTRCS: CPT

## 2017-10-23 PROCEDURE — 94060 EVALUATION OF WHEEZING: CPT

## 2017-10-23 RX ORDER — LEFLUNOMIDE 20 MG/1
20 TABLET, FILM COATED ORAL DAILY
Qty: 30 | Refills: 0 | Status: DISCONTINUED | COMMUNITY
Start: 2017-08-29 | End: 2017-10-23

## 2017-10-30 ENCOUNTER — APPOINTMENT (OUTPATIENT)
Dept: ALLERGY | Facility: CLINIC | Age: 27
End: 2017-10-30
Payer: COMMERCIAL

## 2017-10-30 ENCOUNTER — APPOINTMENT (OUTPATIENT)
Dept: PAIN MANAGEMENT | Facility: CLINIC | Age: 27
End: 2017-10-30
Payer: COMMERCIAL

## 2017-10-30 VITALS
HEIGHT: 67 IN | DIASTOLIC BLOOD PRESSURE: 80 MMHG | WEIGHT: 201 LBS | RESPIRATION RATE: 14 BRPM | SYSTOLIC BLOOD PRESSURE: 116 MMHG | BODY MASS INDEX: 31.55 KG/M2 | HEART RATE: 80 BPM

## 2017-10-30 VITALS
SYSTOLIC BLOOD PRESSURE: 115 MMHG | HEART RATE: 82 BPM | WEIGHT: 201 LBS | BODY MASS INDEX: 31.55 KG/M2 | HEIGHT: 67 IN | DIASTOLIC BLOOD PRESSURE: 79 MMHG

## 2017-10-30 PROCEDURE — 95018 ALL TSTG PERQ&IQ DRUGS/BIOL: CPT

## 2017-10-30 PROCEDURE — 95024 IQ TESTS W/ALLERGENIC XTRCS: CPT

## 2017-10-30 PROCEDURE — 99214 OFFICE O/P EST MOD 30 MIN: CPT | Mod: 25

## 2017-10-30 PROCEDURE — 99213 OFFICE O/P EST LOW 20 MIN: CPT

## 2017-10-30 RX ORDER — FLUTICASONE PROPIONATE 100 UG/1
100 POWDER, METERED RESPIRATORY (INHALATION) TWICE DAILY
Qty: 60 | Refills: 3 | Status: DISCONTINUED | COMMUNITY
Start: 2017-06-06 | End: 2017-10-30

## 2017-10-30 RX ORDER — SALMETEROL XINAFOATE 50 UG/1
50 POWDER, METERED ORAL; RESPIRATORY (INHALATION)
Qty: 3 | Refills: 3 | Status: DISCONTINUED | COMMUNITY
Start: 2017-06-06 | End: 2017-10-30

## 2017-11-11 ENCOUNTER — APPOINTMENT (OUTPATIENT)
Dept: RHEUMATOLOGY | Facility: CLINIC | Age: 27
End: 2017-11-11
Payer: COMMERCIAL

## 2017-11-11 ENCOUNTER — LABORATORY RESULT (OUTPATIENT)
Age: 27
End: 2017-11-11

## 2017-11-11 PROCEDURE — 96413 CHEMO IV INFUSION 1 HR: CPT

## 2017-11-11 PROCEDURE — 36415 COLL VENOUS BLD VENIPUNCTURE: CPT

## 2017-11-14 ENCOUNTER — RX RENEWAL (OUTPATIENT)
Age: 27
End: 2017-11-14

## 2017-11-21 ENCOUNTER — APPOINTMENT (OUTPATIENT)
Dept: VASCULAR SURGERY | Facility: CLINIC | Age: 27
End: 2017-11-21
Payer: COMMERCIAL

## 2017-11-21 DIAGNOSIS — Z87.891 PERSONAL HISTORY OF NICOTINE DEPENDENCE: ICD-10-CM

## 2017-11-21 DIAGNOSIS — Z82.61 FAMILY HISTORY OF ARTHRITIS: ICD-10-CM

## 2017-11-21 DIAGNOSIS — E06.3 AUTOIMMUNE THYROIDITIS: ICD-10-CM

## 2017-11-21 DIAGNOSIS — Z83.3 FAMILY HISTORY OF DIABETES MELLITUS: ICD-10-CM

## 2017-11-21 PROCEDURE — 93970 EXTREMITY STUDY: CPT

## 2017-11-21 PROCEDURE — 99202 OFFICE O/P NEW SF 15 MIN: CPT | Mod: 25

## 2017-11-24 ENCOUNTER — EMERGENCY (EMERGENCY)
Facility: HOSPITAL | Age: 27
LOS: 0 days | Discharge: HOME | End: 2017-11-24
Attending: EMERGENCY MEDICINE | Admitting: EMERGENCY MEDICINE
Payer: COMMERCIAL

## 2017-11-24 VITALS
DIASTOLIC BLOOD PRESSURE: 70 MMHG | TEMPERATURE: 97 F | SYSTOLIC BLOOD PRESSURE: 130 MMHG | OXYGEN SATURATION: 100 % | HEART RATE: 96 BPM | WEIGHT: 160.06 LBS | HEIGHT: 67 IN | RESPIRATION RATE: 20 BRPM

## 2017-11-24 DIAGNOSIS — Z98.89 OTHER SPECIFIED POSTPROCEDURAL STATES: Chronic | ICD-10-CM
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
## 2017-11-24 DIAGNOSIS — S83.005A UNSPECIFIED DISLOCATION OF LEFT PATELLA, INITIAL ENCOUNTER: ICD-10-CM
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
## 2017-11-24 DIAGNOSIS — Z33.2 ENCOUNTER FOR ELECTIVE TERMINATION OF PREGNANCY: Chronic | ICD-10-CM

## 2017-11-24 DIAGNOSIS — M25.562 PAIN IN LEFT KNEE: ICD-10-CM

## 2017-11-24 PROCEDURE — 99283 EMERGENCY DEPT VISIT LOW MDM: CPT | Mod: 25

## 2017-11-24 PROCEDURE — 73562 X-RAY EXAM OF KNEE 3: CPT | Mod: 26,LT

## 2017-11-24 NOTE — ED PROVIDER NOTE - OBJECTIVE STATEMENT
26 yo female with knee injury.  Pt was standing, felt the leg give way and twist.  She felt the left knee cap dislocate.  She has had a patellar dislocation of the right knee 2004 and this was a similar sensation.  She held on to the patella for the ride to the ED and when they moved her to the ED stretcher she put pressure on the knee cap in anticipation of the move and the patella popped back in place.  Now she denies pain or spasm.  Received 30mg toradol en route by EMS.  No numbness/weakness.

## 2017-11-24 NOTE — ED PROVIDER NOTE - MUSCULOSKELETAL, MLM
LLE appears wnl.  No deformity.  FROM without pain or limitation.  Popliteal pulse 2+, DP and PT 2+, sensation intact. No significant edema or ecchymosis to knee.

## 2017-11-24 NOTE — ED ADULT NURSE NOTE - OBJECTIVE STATEMENT
pt BIBA c/o possible dislocated knee. pt hx dislocation of knee. pt was able to pop back knee when moving onto ED stretcher. pt has no pain at this time and able to move extremity without difficulty.

## 2017-11-25 ENCOUNTER — EMERGENCY (EMERGENCY)
Facility: HOSPITAL | Age: 27
LOS: 1 days | Discharge: HOME | End: 2017-11-25
Attending: EMERGENCY MEDICINE | Admitting: EMERGENCY MEDICINE
Payer: COMMERCIAL

## 2017-11-25 VITALS
WEIGHT: 195.11 LBS | TEMPERATURE: 98 F | DIASTOLIC BLOOD PRESSURE: 69 MMHG | RESPIRATION RATE: 18 BRPM | SYSTOLIC BLOOD PRESSURE: 110 MMHG | HEART RATE: 76 BPM | OXYGEN SATURATION: 100 % | HEIGHT: 67 IN

## 2017-11-25 VITALS
HEART RATE: 67 BPM | OXYGEN SATURATION: 100 % | DIASTOLIC BLOOD PRESSURE: 74 MMHG | SYSTOLIC BLOOD PRESSURE: 110 MMHG | TEMPERATURE: 98 F | RESPIRATION RATE: 16 BRPM

## 2017-11-25 DIAGNOSIS — Z98.89 OTHER SPECIFIED POSTPROCEDURAL STATES: Chronic | ICD-10-CM

## 2017-11-25 DIAGNOSIS — Z33.2 ENCOUNTER FOR ELECTIVE TERMINATION OF PREGNANCY: Chronic | ICD-10-CM

## 2017-11-25 PROCEDURE — 99283 EMERGENCY DEPT VISIT LOW MDM: CPT

## 2017-11-25 RX ORDER — DIAZEPAM 5 MG
5 TABLET ORAL ONCE
Qty: 0 | Refills: 0 | Status: DISCONTINUED | OUTPATIENT
Start: 2017-11-25 | End: 2017-11-25

## 2017-11-25 RX ORDER — DIAZEPAM 5 MG
1 TABLET ORAL
Qty: 12 | Refills: 0 | OUTPATIENT
Start: 2017-11-25

## 2017-11-25 RX ORDER — ACETAMINOPHEN 500 MG
650 TABLET ORAL ONCE
Qty: 0 | Refills: 0 | Status: COMPLETED | OUTPATIENT
Start: 2017-11-25 | End: 2017-11-25

## 2017-11-25 RX ADMIN — Medication 5 MILLIGRAM(S): at 14:57

## 2017-11-25 RX ADMIN — Medication 650 MILLIGRAM(S): at 14:57

## 2017-11-25 NOTE — ED PROVIDER NOTE - ATTENDING CONTRIBUTION TO CARE
pt with recent patellar dislocation. here with continued discomfort.   on exam, no leg edema or ttp. no ecchymosis.   Recent patellar dislocation  pain meds, muscle relaxers, fu ortho.

## 2017-11-25 NOTE — ED ADULT NURSE NOTE - OBJECTIVE STATEMENT
Pt is a 28 yo female with the co left knee pain sp knee dislocation. Pt reports recently being seen at [facility] and had her knee reduced after a slip and fall two night ago. Pt states she has intermittent spams at time which increases her pain. Pt is able to ambulate but is having increased pain. Pt denies any CP or SOB no N/V/D no cough fever or chills. She has a pmh of Lupus, RA

## 2017-11-25 NOTE — ED PROVIDER NOTE - OBJECTIVE STATEMENT
27y Female PMH Lupus, migraine, rheumatoid arthritis PSH right knee surgery complaining of Left knee pain s/p knee dislocation. Had a dislocation Thanksgiving night two nights ago after slip and fall, was reduced in [facility]. Feels like it is still going into spasms. Difficult to put extreme amounts of weight on the leg. 27y Female PMH Lupus, migraine, rheumatoid arthritis PSH right knee surgery complaining of Left knee pain s/p patellar dislocation. Had a left patellar dislocation Thanksgiving night two nights ago after slip and fall without direct trauma, was spontaneously reduced prior to arriving in [facility]. Feels like it is still going into spasms. Difficult to put extreme amounts of weight on the leg. but still able to ambulate, given knee immobilizer.

## 2017-11-25 NOTE — ED PROVIDER NOTE - PLAN OF CARE
1) Please follow-up with orthopedics (your own or clinic [phone]) as previously scheduled.  Please call today or tomorrow for an appointment.  If you cannot follow-up with your doctor(s), please return to the ED for any urgent issues.  2) If you have any worsening of symptoms or any other concerns please return to the ED immediately.  3) Please continue taking your home medications as directed. You may take acetaminophen (Tylenol) and ibuprofen (Motrin) as per instructions on the medication box for fever/pain, do not exceed the recommended daily limits. Please take VALIUM every 6 hours, as needed, for SEVERE pain. Please do not drive, make any important decisions or operate heavy machinery while on this medication.

## 2017-11-25 NOTE — ED PROVIDER NOTE - MEDICAL DECISION MAKING DETAILS
Continued muscle spasms / pain s/p left patellar dislocation, currently still in place, will discharge with valium and nonnarcotic pain meds

## 2017-11-25 NOTE — ED PROVIDER NOTE - CARE PLAN
Principal Discharge DX:	Muscle spasm  Instructions for follow-up, activity and diet:	1) Please follow-up with orthopedics (your own or clinic [phone]) as previously scheduled.  Please call today or tomorrow for an appointment.  If you cannot follow-up with your doctor(s), please return to the ED for any urgent issues.  2) If you have any worsening of symptoms or any other concerns please return to the ED immediately.  3) Please continue taking your home medications as directed. You may take acetaminophen (Tylenol) and ibuprofen (Motrin) as per instructions on the medication box for fever/pain, do not exceed the recommended daily limits. Please take VALIUM every 6 hours, as needed, for SEVERE pain. Please do not drive, make any important decisions or operate heavy machinery while on this medication.

## 2017-11-25 NOTE — ED PROVIDER NOTE - PHYSICAL EXAMINATION
PE: CONSTITUTIONAL: Well appearing, well nourished, in no apparent distress. ENMT: Airway patent, nasal mucosa clear, mouth with normal mucosa. HEAD: NCAT EYES: PERRL, EOMI bilaterally CARDIAC: RRR, no m/r/g, no pedal edema RESPIRATORY: CTA bilaterally, no adventitious sounds GI: Abdomen non-distended, non-tender MSK: Spine appears normal, range of motion is not limited, swelling and mild-mod TTP of muscles surrounding left knee, no gross deformities NEURO: CNII-XII grossly intact, 5/5 strength, full sensation all extremities, gait intact SKIN: Skin tone normal in color, warm and dry. No evidence of rash.

## 2017-12-05 ENCOUNTER — LABORATORY RESULT (OUTPATIENT)
Age: 27
End: 2017-12-05

## 2017-12-05 ENCOUNTER — APPOINTMENT (OUTPATIENT)
Dept: RHEUMATOLOGY | Facility: CLINIC | Age: 27
End: 2017-12-05
Payer: COMMERCIAL

## 2017-12-05 PROCEDURE — 96413 CHEMO IV INFUSION 1 HR: CPT

## 2017-12-05 PROCEDURE — 36415 COLL VENOUS BLD VENIPUNCTURE: CPT

## 2017-12-06 ENCOUNTER — APPOINTMENT (OUTPATIENT)
Dept: ORTHOPEDIC SURGERY | Facility: CLINIC | Age: 27
End: 2017-12-06
Payer: COMMERCIAL

## 2017-12-06 VITALS — SYSTOLIC BLOOD PRESSURE: 102 MMHG | HEART RATE: 80 BPM | DIASTOLIC BLOOD PRESSURE: 61 MMHG

## 2017-12-06 VITALS — HEIGHT: 67 IN | WEIGHT: 190 LBS | BODY MASS INDEX: 29.82 KG/M2

## 2017-12-06 PROCEDURE — 99203 OFFICE O/P NEW LOW 30 MIN: CPT

## 2017-12-06 PROCEDURE — 73562 X-RAY EXAM OF KNEE 3: CPT | Mod: LT

## 2017-12-06 RX ORDER — DIAZEPAM 5 MG/1
5 TABLET ORAL
Qty: 12 | Refills: 0 | Status: COMPLETED | COMMUNITY
Start: 2017-11-25

## 2017-12-11 ENCOUNTER — FORM ENCOUNTER (OUTPATIENT)
Age: 27
End: 2017-12-11

## 2017-12-12 ENCOUNTER — APPOINTMENT (OUTPATIENT)
Dept: ULTRASOUND IMAGING | Facility: CLINIC | Age: 27
End: 2017-12-12
Payer: COMMERCIAL

## 2017-12-12 ENCOUNTER — OUTPATIENT (OUTPATIENT)
Dept: OUTPATIENT SERVICES | Facility: HOSPITAL | Age: 27
LOS: 1 days | End: 2017-12-12
Payer: COMMERCIAL

## 2017-12-12 ENCOUNTER — APPOINTMENT (OUTPATIENT)
Dept: MRI IMAGING | Facility: CLINIC | Age: 27
End: 2017-12-12
Payer: COMMERCIAL

## 2017-12-12 DIAGNOSIS — Z33.2 ENCOUNTER FOR ELECTIVE TERMINATION OF PREGNANCY: Chronic | ICD-10-CM

## 2017-12-12 DIAGNOSIS — Z00.8 ENCOUNTER FOR OTHER GENERAL EXAMINATION: ICD-10-CM

## 2017-12-12 DIAGNOSIS — Z98.89 OTHER SPECIFIED POSTPROCEDURAL STATES: Chronic | ICD-10-CM

## 2017-12-12 PROCEDURE — 76830 TRANSVAGINAL US NON-OB: CPT | Mod: 26

## 2017-12-12 PROCEDURE — 73721 MRI JNT OF LWR EXTRE W/O DYE: CPT

## 2017-12-12 PROCEDURE — 76856 US EXAM PELVIC COMPLETE: CPT | Mod: 26

## 2017-12-12 PROCEDURE — 76856 US EXAM PELVIC COMPLETE: CPT

## 2017-12-12 PROCEDURE — 76830 TRANSVAGINAL US NON-OB: CPT

## 2017-12-12 PROCEDURE — 73721 MRI JNT OF LWR EXTRE W/O DYE: CPT | Mod: 26,LT

## 2017-12-15 ENCOUNTER — APPOINTMENT (OUTPATIENT)
Dept: ORTHOPEDIC SURGERY | Facility: CLINIC | Age: 27
End: 2017-12-15
Payer: COMMERCIAL

## 2017-12-15 PROCEDURE — 99213 OFFICE O/P EST LOW 20 MIN: CPT

## 2017-12-27 ENCOUNTER — LABORATORY RESULT (OUTPATIENT)
Age: 27
End: 2017-12-27

## 2017-12-27 ENCOUNTER — APPOINTMENT (OUTPATIENT)
Dept: RHEUMATOLOGY | Facility: CLINIC | Age: 27
End: 2017-12-27
Payer: COMMERCIAL

## 2017-12-27 VITALS
HEART RATE: 74 BPM | BODY MASS INDEX: 30.13 KG/M2 | HEIGHT: 67 IN | RESPIRATION RATE: 14 BRPM | SYSTOLIC BLOOD PRESSURE: 118 MMHG | WEIGHT: 192 LBS | OXYGEN SATURATION: 98 % | TEMPERATURE: 98 F | DIASTOLIC BLOOD PRESSURE: 72 MMHG

## 2017-12-27 DIAGNOSIS — Z78.9 OTHER SPECIFIED HEALTH STATUS: ICD-10-CM

## 2017-12-27 DIAGNOSIS — Z87.09 PERSONAL HISTORY OF OTHER DISEASES OF THE RESPIRATORY SYSTEM: ICD-10-CM

## 2017-12-27 DIAGNOSIS — Z87.39 PERSONAL HISTORY OF OTHER DISEASES OF THE MUSCULOSKELETAL SYSTEM AND CONNECTIVE TISSUE: ICD-10-CM

## 2017-12-27 DIAGNOSIS — Z80.9 FAMILY HISTORY OF MALIGNANT NEOPLASM, UNSPECIFIED: ICD-10-CM

## 2017-12-27 DIAGNOSIS — M32.9 SYSTEMIC LUPUS ERYTHEMATOSUS, UNSPECIFIED: ICD-10-CM

## 2017-12-27 PROCEDURE — 99214 OFFICE O/P EST MOD 30 MIN: CPT

## 2017-12-28 LAB
25(OH)D3 SERPL-MCNC: 36 NG/ML
ALBUMIN SERPL ELPH-MCNC: 3.9 G/DL
ALP BLD-CCNC: 78 U/L
ALT SERPL-CCNC: 6 U/L
ANION GAP SERPL CALC-SCNC: 14 MMOL/L
APPEARANCE: ABNORMAL
AST SERPL-CCNC: 9 U/L
BASOPHILS # BLD AUTO: 0.02 K/UL
BASOPHILS NFR BLD AUTO: 0.4 %
BILIRUB SERPL-MCNC: 0.2 MG/DL
BILIRUBIN URINE: NEGATIVE
BLOOD URINE: NEGATIVE
BUN SERPL-MCNC: 16 MG/DL
CALCIUM SERPL-MCNC: 9.3 MG/DL
CHLORIDE SERPL-SCNC: 103 MMOL/L
CO2 SERPL-SCNC: 22 MMOL/L
COLOR: YELLOW
CREAT SERPL-MCNC: 0.78 MG/DL
CRP SERPL-MCNC: 1.5 MG/DL
EOSINOPHIL # BLD AUTO: 0.06 K/UL
EOSINOPHIL NFR BLD AUTO: 1.1 %
ERYTHROCYTE [SEDIMENTATION RATE] IN BLOOD BY WESTERGREN METHOD: 27 MM/HR
GLUCOSE QUALITATIVE U: NEGATIVE MG/DL
GLUCOSE SERPL-MCNC: 110 MG/DL
HCT VFR BLD CALC: 37.3 %
HGB BLD-MCNC: 12.3 G/DL
IMM GRANULOCYTES NFR BLD AUTO: 0 %
KETONES URINE: NEGATIVE
LEUKOCYTE ESTERASE URINE: NEGATIVE
LYMPHOCYTES # BLD AUTO: 1.64 K/UL
LYMPHOCYTES NFR BLD AUTO: 31.2 %
MAN DIFF?: NORMAL
MCHC RBC-ENTMCNC: 28 PG
MCHC RBC-ENTMCNC: 33 GM/DL
MCV RBC AUTO: 85 FL
MONOCYTES # BLD AUTO: 0.45 K/UL
MONOCYTES NFR BLD AUTO: 8.6 %
NEUTROPHILS # BLD AUTO: 3.08 K/UL
NEUTROPHILS NFR BLD AUTO: 58.7 %
NITRITE URINE: NEGATIVE
PH URINE: 5.5
PLATELET # BLD AUTO: 397 K/UL
POTASSIUM SERPL-SCNC: 3.9 MMOL/L
PROT SERPL-MCNC: 7.4 G/DL
PROTEIN URINE: NEGATIVE MG/DL
RBC # BLD: 4.39 M/UL
RBC # FLD: 16.1 %
SODIUM SERPL-SCNC: 139 MMOL/L
SPECIFIC GRAVITY URINE: 1.03
UROBILINOGEN URINE: NEGATIVE MG/DL
WBC # FLD AUTO: 5.25 K/UL

## 2017-12-29 ENCOUNTER — RX RENEWAL (OUTPATIENT)
Age: 27
End: 2017-12-29

## 2018-01-03 ENCOUNTER — APPOINTMENT (OUTPATIENT)
Dept: RHEUMATOLOGY | Facility: CLINIC | Age: 28
End: 2018-01-03
Payer: COMMERCIAL

## 2018-01-03 PROCEDURE — 96413 CHEMO IV INFUSION 1 HR: CPT

## 2018-01-10 NOTE — ED PROVIDER NOTE - NS_EDPROVIDERDISPOUSERTYPE_ED_A_ED
Anesthetic History     PONV          Review of Systems / Medical History  Patient summary reviewed and pertinent labs reviewed    Pulmonary          Smoker         Neuro/Psych              Cardiovascular              Hyperlipidemia    Exercise tolerance: >4 METS     GI/Hepatic/Renal  Within defined limits              Endo/Other  Within defined limits           Other Findings            Physical Exam    Airway  Mallampati: III  TM Distance: 4 - 6 cm  Neck ROM: normal range of motion   Mouth opening: Diminished (comment)     Cardiovascular    Rhythm: regular  Rate: normal         Dental         Pulmonary  Breath sounds clear to auscultation               Abdominal         Other Findings            Anesthetic Plan    ASA: 2  Anesthesia type: general      Post-op pain plan if not by surgeon: peripheral nerve block single    Induction: Intravenous  Anesthetic plan and risks discussed with: Patient and Spouse Attending Attestation (For Attendings USE Only)...

## 2018-01-15 ENCOUNTER — FORM ENCOUNTER (OUTPATIENT)
Age: 28
End: 2018-01-15

## 2018-01-16 ENCOUNTER — APPOINTMENT (OUTPATIENT)
Dept: ULTRASOUND IMAGING | Facility: HOSPITAL | Age: 28
End: 2018-01-16
Payer: COMMERCIAL

## 2018-01-16 ENCOUNTER — OUTPATIENT (OUTPATIENT)
Dept: OUTPATIENT SERVICES | Facility: HOSPITAL | Age: 28
LOS: 1 days | End: 2018-01-16
Payer: COMMERCIAL

## 2018-01-16 DIAGNOSIS — R10.9 UNSPECIFIED ABDOMINAL PAIN: ICD-10-CM

## 2018-01-16 DIAGNOSIS — Z00.8 ENCOUNTER FOR OTHER GENERAL EXAMINATION: ICD-10-CM

## 2018-01-16 DIAGNOSIS — Z33.2 ENCOUNTER FOR ELECTIVE TERMINATION OF PREGNANCY: Chronic | ICD-10-CM

## 2018-01-16 DIAGNOSIS — Z98.89 OTHER SPECIFIED POSTPROCEDURAL STATES: Chronic | ICD-10-CM

## 2018-01-16 PROCEDURE — 76700 US EXAM ABDOM COMPLETE: CPT | Mod: 26

## 2018-01-16 PROCEDURE — 76700 US EXAM ABDOM COMPLETE: CPT

## 2018-01-17 ENCOUNTER — APPOINTMENT (OUTPATIENT)
Dept: ALLERGY | Facility: CLINIC | Age: 28
End: 2018-01-17
Payer: COMMERCIAL

## 2018-01-17 VITALS
HEART RATE: 72 BPM | DIASTOLIC BLOOD PRESSURE: 80 MMHG | SYSTOLIC BLOOD PRESSURE: 120 MMHG | HEIGHT: 67 IN | WEIGHT: 190 LBS | RESPIRATION RATE: 14 BRPM | BODY MASS INDEX: 29.82 KG/M2

## 2018-01-17 PROCEDURE — 94060 EVALUATION OF WHEEZING: CPT

## 2018-01-17 PROCEDURE — 99214 OFFICE O/P EST MOD 30 MIN: CPT | Mod: 25

## 2018-01-30 ENCOUNTER — LABORATORY RESULT (OUTPATIENT)
Age: 28
End: 2018-01-30

## 2018-01-30 ENCOUNTER — APPOINTMENT (OUTPATIENT)
Dept: RHEUMATOLOGY | Facility: CLINIC | Age: 28
End: 2018-01-30
Payer: COMMERCIAL

## 2018-01-30 PROCEDURE — 36415 COLL VENOUS BLD VENIPUNCTURE: CPT

## 2018-01-30 PROCEDURE — 96413 CHEMO IV INFUSION 1 HR: CPT

## 2018-02-07 ENCOUNTER — TRANSCRIPTION ENCOUNTER (OUTPATIENT)
Age: 28
End: 2018-02-07

## 2018-02-21 ENCOUNTER — APPOINTMENT (OUTPATIENT)
Dept: PAIN MANAGEMENT | Facility: CLINIC | Age: 28
End: 2018-02-21
Payer: COMMERCIAL

## 2018-02-21 VITALS
HEART RATE: 83 BPM | BODY MASS INDEX: 29.51 KG/M2 | WEIGHT: 188 LBS | SYSTOLIC BLOOD PRESSURE: 122 MMHG | DIASTOLIC BLOOD PRESSURE: 74 MMHG | HEIGHT: 67 IN

## 2018-02-21 PROCEDURE — 99214 OFFICE O/P EST MOD 30 MIN: CPT

## 2018-02-28 ENCOUNTER — APPOINTMENT (OUTPATIENT)
Dept: RHEUMATOLOGY | Facility: CLINIC | Age: 28
End: 2018-02-28
Payer: COMMERCIAL

## 2018-02-28 PROCEDURE — 36415 COLL VENOUS BLD VENIPUNCTURE: CPT

## 2018-02-28 PROCEDURE — 96413 CHEMO IV INFUSION 1 HR: CPT

## 2018-03-01 ENCOUNTER — EMERGENCY (EMERGENCY)
Facility: HOSPITAL | Age: 28
LOS: 1 days | Discharge: HOME | End: 2018-03-01
Attending: EMERGENCY MEDICINE | Admitting: EMERGENCY MEDICINE
Payer: COMMERCIAL

## 2018-03-01 VITALS
DIASTOLIC BLOOD PRESSURE: 76 MMHG | SYSTOLIC BLOOD PRESSURE: 129 MMHG | OXYGEN SATURATION: 98 % | RESPIRATION RATE: 16 BRPM | HEART RATE: 79 BPM

## 2018-03-01 VITALS
DIASTOLIC BLOOD PRESSURE: 77 MMHG | OXYGEN SATURATION: 97 % | HEIGHT: 67 IN | TEMPERATURE: 98 F | HEART RATE: 78 BPM | WEIGHT: 188.05 LBS | SYSTOLIC BLOOD PRESSURE: 133 MMHG | RESPIRATION RATE: 18 BRPM

## 2018-03-01 DIAGNOSIS — Z98.89 OTHER SPECIFIED POSTPROCEDURAL STATES: Chronic | ICD-10-CM

## 2018-03-01 DIAGNOSIS — Z33.2 ENCOUNTER FOR ELECTIVE TERMINATION OF PREGNANCY: Chronic | ICD-10-CM

## 2018-03-01 LAB
ALBUMIN SERPL ELPH-MCNC: 4 G/DL — SIGNIFICANT CHANGE UP (ref 3.3–5)
ALP SERPL-CCNC: 71 U/L — SIGNIFICANT CHANGE UP (ref 40–120)
ALT FLD-CCNC: 7 U/L RC — LOW (ref 10–45)
ANION GAP SERPL CALC-SCNC: 7 MMOL/L — SIGNIFICANT CHANGE UP (ref 5–17)
APPEARANCE UR: CLEAR — SIGNIFICANT CHANGE UP
AST SERPL-CCNC: 14 U/L — SIGNIFICANT CHANGE UP (ref 10–40)
BACTERIA # UR AUTO: ABNORMAL /HPF
BASOPHILS # BLD AUTO: 0 K/UL — SIGNIFICANT CHANGE UP (ref 0–0.2)
BASOPHILS NFR BLD AUTO: 0.9 % — SIGNIFICANT CHANGE UP (ref 0–2)
BILIRUB SERPL-MCNC: 0.2 MG/DL — SIGNIFICANT CHANGE UP (ref 0.2–1.2)
BILIRUB UR-MCNC: NEGATIVE — SIGNIFICANT CHANGE UP
BUN SERPL-MCNC: 9 MG/DL — SIGNIFICANT CHANGE UP (ref 7–23)
CALCIUM SERPL-MCNC: 9.2 MG/DL — SIGNIFICANT CHANGE UP (ref 8.4–10.5)
CHLORIDE SERPL-SCNC: 102 MMOL/L — SIGNIFICANT CHANGE UP (ref 96–108)
CO2 SERPL-SCNC: 30 MMOL/L — SIGNIFICANT CHANGE UP (ref 22–31)
COLOR SPEC: SIGNIFICANT CHANGE UP
CREAT SERPL-MCNC: 0.56 MG/DL — SIGNIFICANT CHANGE UP (ref 0.5–1.3)
DIFF PNL FLD: NEGATIVE — SIGNIFICANT CHANGE UP
EOSINOPHIL # BLD AUTO: 0.1 K/UL — SIGNIFICANT CHANGE UP (ref 0–0.5)
EOSINOPHIL NFR BLD AUTO: 1 % — SIGNIFICANT CHANGE UP (ref 0–6)
EPI CELLS # UR: SIGNIFICANT CHANGE UP /HPF
GAS PNL BLDV: SIGNIFICANT CHANGE UP
GLUCOSE SERPL-MCNC: 92 MG/DL — SIGNIFICANT CHANGE UP (ref 70–99)
GLUCOSE UR QL: NEGATIVE — SIGNIFICANT CHANGE UP
HCT VFR BLD CALC: 37.9 % — SIGNIFICANT CHANGE UP (ref 34.5–45)
HGB BLD-MCNC: 12.7 G/DL — SIGNIFICANT CHANGE UP (ref 11.5–15.5)
KETONES UR-MCNC: NEGATIVE — SIGNIFICANT CHANGE UP
LEUKOCYTE ESTERASE UR-ACNC: NEGATIVE — SIGNIFICANT CHANGE UP
LYMPHOCYTES # BLD AUTO: 1.8 K/UL — SIGNIFICANT CHANGE UP (ref 1–3.3)
LYMPHOCYTES # BLD AUTO: 34.5 % — SIGNIFICANT CHANGE UP (ref 13–44)
MCHC RBC-ENTMCNC: 30.5 PG — SIGNIFICANT CHANGE UP (ref 27–34)
MCHC RBC-ENTMCNC: 33.4 GM/DL — SIGNIFICANT CHANGE UP (ref 32–36)
MCV RBC AUTO: 91.1 FL — SIGNIFICANT CHANGE UP (ref 80–100)
MONOCYTES # BLD AUTO: 0.4 K/UL — SIGNIFICANT CHANGE UP (ref 0–0.9)
MONOCYTES NFR BLD AUTO: 7.2 % — SIGNIFICANT CHANGE UP (ref 2–14)
NEUTROPHILS # BLD AUTO: 3 K/UL — SIGNIFICANT CHANGE UP (ref 1.8–7.4)
NEUTROPHILS NFR BLD AUTO: 56.4 % — SIGNIFICANT CHANGE UP (ref 43–77)
NITRITE UR-MCNC: NEGATIVE — SIGNIFICANT CHANGE UP
PH UR: 7 — SIGNIFICANT CHANGE UP (ref 5–8)
PLATELET # BLD AUTO: 354 K/UL — SIGNIFICANT CHANGE UP (ref 150–400)
POTASSIUM SERPL-MCNC: 3.3 MMOL/L — LOW (ref 3.5–5.3)
POTASSIUM SERPL-SCNC: 3.3 MMOL/L — LOW (ref 3.5–5.3)
PROT SERPL-MCNC: 7.5 G/DL — SIGNIFICANT CHANGE UP (ref 6–8.3)
PROT UR-MCNC: SIGNIFICANT CHANGE UP
RBC # BLD: 4.16 M/UL — SIGNIFICANT CHANGE UP (ref 3.8–5.2)
RBC # FLD: 13.8 % — SIGNIFICANT CHANGE UP (ref 10.3–14.5)
SODIUM SERPL-SCNC: 139 MMOL/L — SIGNIFICANT CHANGE UP (ref 135–145)
SP GR SPEC: 1.02 — SIGNIFICANT CHANGE UP (ref 1.01–1.02)
UROBILINOGEN FLD QL: NEGATIVE — SIGNIFICANT CHANGE UP
WBC # BLD: 5.3 K/UL — SIGNIFICANT CHANGE UP (ref 3.8–10.5)
WBC # FLD AUTO: 5.3 K/UL — SIGNIFICANT CHANGE UP (ref 3.8–10.5)
WBC UR QL: SIGNIFICANT CHANGE UP /HPF (ref 0–5)

## 2018-03-01 PROCEDURE — 84295 ASSAY OF SERUM SODIUM: CPT

## 2018-03-01 PROCEDURE — 80053 COMPREHEN METABOLIC PANEL: CPT

## 2018-03-01 PROCEDURE — 87086 URINE CULTURE/COLONY COUNT: CPT

## 2018-03-01 PROCEDURE — 96374 THER/PROPH/DIAG INJ IV PUSH: CPT

## 2018-03-01 PROCEDURE — 85014 HEMATOCRIT: CPT

## 2018-03-01 PROCEDURE — 82330 ASSAY OF CALCIUM: CPT

## 2018-03-01 PROCEDURE — 82803 BLOOD GASES ANY COMBINATION: CPT

## 2018-03-01 PROCEDURE — 99284 EMERGENCY DEPT VISIT MOD MDM: CPT | Mod: 25

## 2018-03-01 PROCEDURE — 87040 BLOOD CULTURE FOR BACTERIA: CPT

## 2018-03-01 PROCEDURE — 81001 URINALYSIS AUTO W/SCOPE: CPT

## 2018-03-01 PROCEDURE — 99284 EMERGENCY DEPT VISIT MOD MDM: CPT

## 2018-03-01 PROCEDURE — 85027 COMPLETE CBC AUTOMATED: CPT

## 2018-03-01 PROCEDURE — 82435 ASSAY OF BLOOD CHLORIDE: CPT

## 2018-03-01 PROCEDURE — 82947 ASSAY GLUCOSE BLOOD QUANT: CPT

## 2018-03-01 PROCEDURE — 83605 ASSAY OF LACTIC ACID: CPT

## 2018-03-01 PROCEDURE — 84132 ASSAY OF SERUM POTASSIUM: CPT

## 2018-03-01 RX ORDER — SUMATRIPTAN SUCCINATE 4 MG/.5ML
0 INJECTION, SOLUTION SUBCUTANEOUS
Qty: 0 | Refills: 0 | COMMUNITY

## 2018-03-01 RX ORDER — FLUTICASONE PROPIONATE 50 MCG
1 SPRAY, SUSPENSION NASAL
Qty: 0 | Refills: 0 | COMMUNITY

## 2018-03-01 RX ORDER — LEVOCARNITINE 330 MG/1
0 TABLET ORAL
Qty: 0 | Refills: 0 | COMMUNITY

## 2018-03-01 RX ORDER — FOLIC ACID 0.8 MG
1 TABLET ORAL
Qty: 0 | Refills: 0 | COMMUNITY

## 2018-03-01 RX ORDER — OMEPRAZOLE 10 MG/1
1 CAPSULE, DELAYED RELEASE ORAL
Qty: 0 | Refills: 0 | COMMUNITY

## 2018-03-01 RX ORDER — HYDROXYCHLOROQUINE SULFATE 200 MG
1 TABLET ORAL
Qty: 0 | Refills: 0 | COMMUNITY

## 2018-03-01 RX ORDER — BUDESONIDE AND FORMOTEROL FUMARATE DIHYDRATE 160; 4.5 UG/1; UG/1
1 AEROSOL RESPIRATORY (INHALATION)
Qty: 0 | Refills: 0 | COMMUNITY

## 2018-03-01 RX ORDER — ACETAMINOPHEN 500 MG
1000 TABLET ORAL ONCE
Qty: 0 | Refills: 0 | Status: COMPLETED | OUTPATIENT
Start: 2018-03-01 | End: 2018-03-01

## 2018-03-01 RX ORDER — ALBUTEROL 90 UG/1
2 AEROSOL, METERED ORAL
Qty: 0 | Refills: 0 | COMMUNITY

## 2018-03-01 RX ORDER — SODIUM CHLORIDE 9 MG/ML
1000 INJECTION INTRAMUSCULAR; INTRAVENOUS; SUBCUTANEOUS ONCE
Qty: 0 | Refills: 0 | Status: COMPLETED | OUTPATIENT
Start: 2018-03-01 | End: 2018-03-01

## 2018-03-01 RX ORDER — TOPIRAMATE 25 MG
2 TABLET ORAL
Qty: 0 | Refills: 0 | COMMUNITY

## 2018-03-01 RX ORDER — CHOLECALCIFEROL (VITAMIN D3) 125 MCG
1 CAPSULE ORAL
Qty: 0 | Refills: 0 | COMMUNITY

## 2018-03-01 RX ORDER — LEVOTHYROXINE SODIUM 125 MCG
1 TABLET ORAL
Qty: 0 | Refills: 0 | COMMUNITY

## 2018-03-01 RX ADMIN — SODIUM CHLORIDE 2000 MILLILITER(S): 9 INJECTION INTRAMUSCULAR; INTRAVENOUS; SUBCUTANEOUS at 18:29

## 2018-03-01 RX ADMIN — Medication 400 MILLIGRAM(S): at 18:29

## 2018-03-01 NOTE — ED PROVIDER NOTE - PHYSICAL EXAMINATION
***GEN - well appearing; NAD   ***HEAD - NC/AT  ***EYES/NOSE - PEERL, EOMI, mucous membranes moist, no discharge   ***THROAT: Oral cavity and pharynx normal. No inflammation, swelling, exudate, or lesions.    ***NECK: supple, non-tender no lymphadenopathy  ***PULMONARY - CTA b/l, symmetric breath sounds.   ***CARDIAC- s1s2, RRR, no murmur  ***ABDOMEN - +BS, ND, NT, soft, no guarding, no rebound, no organomegaly  ***BACK - Normal  spine, tenderness to palpation BL lumbar area.    ***EXTREMITIES - symmetric pulses, 2+ dp, capillary refill < 2 seconds, no clubbing, no cyanosis, no edema   ***SKIN - warm, dry, intact, no rash or bruising   ***NEUROLOGIC - a&o x3, CN 3-12 intact, sensation nl, motor 5/5 RUE/LUE/RLE/LLE

## 2018-03-01 NOTE — ED PROVIDER NOTE - OBJECTIVE STATEMENT
28F with past medical history UTI, hypothyroidism, SLE, recurrent migraines, emphysema dx on ct 2014, Asthma, RA. presents with 3 wk h/o back pain.  Initially thought msk, Improves with sitting down. Also complaining of chills without fever and nbnb vomiting, and headache typical of migraine.  Denies dysuria, hematuria, increased frequency, chest pain, shortness of breath, diarrhea, palpitations, cough, weakness, loss of sensation, rash, recent travel.  Sent to ED after speaking with rheum.

## 2018-03-01 NOTE — ED PROVIDER NOTE - DIAGNOSIS COUNSELING, MDM
conducted a detailed discussion... I had a detailed discussion with the patient and/or guardian regarding the historical points, exam findings, and any diagnostic results supporting the discharg diagnosis.

## 2018-03-01 NOTE — ED PROVIDER NOTE - ATTENDING CONTRIBUTION TO CARE
Private Physician Garth Mendoza  28y female pmh Hypothrodism, SLE, migrane, Emphysema dx on ct 2014, Asthma, Rheumatoid Arthtisis. PT comes to ed complains of back pain. onset 3wk ago, "I thought I pullled something" Improves with sitting down. Chills without fever. Nonbloody/non bilious vomiting. and Migrane ha, No dysuria.hematuria,frequency, Spoke to Rheum md and referred to ED. Has hx of prior UTI. and admit for same. PE WDWN female awake alert normocephalic atraumatic chest clear anterior & posterior abd soft +bs +siobhan Left>Right. Neuro no focal defects  Geoffrey Patrick MD, Facep

## 2018-03-01 NOTE — ED PROVIDER NOTE - PROGRESS NOTE DETAILS
Rayna VALDEZ: Pt with normal cbc and reassuring cmp (no sCr elevation). UA not concerning for infection. Pt feeling mildly improved. No clear evidence for etiology of pain. Will dc home with close PMD follow up. Discussed findings with patient. Patient agrees with plan.

## 2018-03-01 NOTE — ED PROVIDER NOTE - MEDICAL DECISION MAKING DETAILS
labs, urine, evaluation for potential pyelo.  Likely msk in origin vs. rheum requring further paul and treatment outpt

## 2018-03-01 NOTE — ED ADULT NURSE NOTE - OBJECTIVE STATEMENT
87 yo presents tot he ED from home. A&Ox4, ambulatory c/o L flank pain x3 weeks and R flank pain x "few days". pt reports that pain was a dull aching pain at first, acute worsening after physical therapy for R knee dislocation. pt reports that since then it has been worsening, "feels like someone is punching me in the back constantly". pt reports "many previous UTI and kidney infection". pt reports she "never gets any symptoms anymore until it gets into the kidney". pt denies urinary frequency, burning, blood in urine. pt reports flank pain is worse upon movement. pt denies abd pain. abd soft nontender. VSS. pt denies fever, chills. pt reports nausea, vomited once this morning. pt reports generalized HA, has history of migraines. pt reports "unsure if I have a HA from the pain or migraine". pt is well appearing. MD at bedside for eval. plan of care discussed. safety and comfort measures maintained.

## 2018-03-01 NOTE — ED ADULT NURSE REASSESSMENT NOTE - NS ED NURSE REASSESS COMMENT FT1
Report received from     MIROSLAVA Gagnon  Pt resting comfortably  Awaiting urine  pt aware urine sample is needed  pt educated on how to give proper sample  verbalized understanding  pending collection   Safety maintained at all times, bed in lowest position, call bell in hand.  Will continue to monitor closely.

## 2018-03-01 NOTE — ED PROVIDER NOTE - NS ED ROS FT
Constitutional: No fever or chills  Eyes: No visual changes  HEENT: No throat pain  CV: No chest pain  Resp: No SOB no cough  GI: as per hpi  : No dysuria  MSK: as per hpi  Skin: No rash  Neuro: No headache

## 2018-03-01 NOTE — ED ADULT TRIAGE NOTE - CHIEF COMPLAINT QUOTE
my doctor sent me in because im having lower back pain; my doctor thinks I have a bladder infection; and I have a migraine right now and I feel sick to my stomach

## 2018-03-02 LAB
CULTURE RESULTS: SIGNIFICANT CHANGE UP
SPECIMEN SOURCE: SIGNIFICANT CHANGE UP

## 2018-03-05 ENCOUNTER — RX RENEWAL (OUTPATIENT)
Age: 28
End: 2018-03-05

## 2018-03-06 LAB
CULTURE RESULTS: SIGNIFICANT CHANGE UP
CULTURE RESULTS: SIGNIFICANT CHANGE UP
SPECIMEN SOURCE: SIGNIFICANT CHANGE UP
SPECIMEN SOURCE: SIGNIFICANT CHANGE UP

## 2018-03-16 ENCOUNTER — APPOINTMENT (OUTPATIENT)
Dept: ORTHOPEDIC SURGERY | Facility: CLINIC | Age: 28
End: 2018-03-16
Payer: COMMERCIAL

## 2018-03-16 PROCEDURE — 99213 OFFICE O/P EST LOW 20 MIN: CPT

## 2018-03-27 ENCOUNTER — LABORATORY RESULT (OUTPATIENT)
Age: 28
End: 2018-03-27

## 2018-03-27 ENCOUNTER — APPOINTMENT (OUTPATIENT)
Dept: RHEUMATOLOGY | Facility: CLINIC | Age: 28
End: 2018-03-27
Payer: COMMERCIAL

## 2018-03-27 PROCEDURE — 36415 COLL VENOUS BLD VENIPUNCTURE: CPT

## 2018-03-27 PROCEDURE — 96413 CHEMO IV INFUSION 1 HR: CPT

## 2018-04-04 ENCOUNTER — APPOINTMENT (OUTPATIENT)
Dept: RHEUMATOLOGY | Facility: CLINIC | Age: 28
End: 2018-04-04
Payer: COMMERCIAL

## 2018-04-04 VITALS
DIASTOLIC BLOOD PRESSURE: 67 MMHG | OXYGEN SATURATION: 98 % | HEART RATE: 78 BPM | RESPIRATION RATE: 14 BRPM | SYSTOLIC BLOOD PRESSURE: 97 MMHG | TEMPERATURE: 98 F

## 2018-04-04 PROCEDURE — 99214 OFFICE O/P EST MOD 30 MIN: CPT

## 2018-04-04 RX ORDER — MONTELUKAST 10 MG/1
10 TABLET, FILM COATED ORAL
Qty: 30 | Refills: 5 | Status: DISCONTINUED | COMMUNITY
Start: 2017-05-31 | End: 2018-04-04

## 2018-04-05 ENCOUNTER — LABORATORY RESULT (OUTPATIENT)
Age: 28
End: 2018-04-05

## 2018-04-05 LAB
CRP SERPL-MCNC: 1.3 MG/DL
ERYTHROCYTE [SEDIMENTATION RATE] IN BLOOD BY WESTERGREN METHOD: 25 MM/HR
HBV CORE IGG+IGM SER QL: NONREACTIVE
HBV SURFACE AB SER QL: REACTIVE
HBV SURFACE AG SER QL: NONREACTIVE
HCV AB SER QL: NONREACTIVE
HCV S/CO RATIO: 0.14 S/CO

## 2018-04-06 ENCOUNTER — RX RENEWAL (OUTPATIENT)
Age: 28
End: 2018-04-06

## 2018-04-06 LAB
25(OH)D3 SERPL-MCNC: 26.9 NG/ML
ADJUSTED MITOGEN: >10 IU/ML
ADJUSTED TB AG: 0 IU/ML
APPEARANCE: ABNORMAL
BILIRUBIN URINE: NEGATIVE
BLOOD URINE: NEGATIVE
COLOR: ABNORMAL
GLUCOSE QUALITATIVE U: NEGATIVE MG/DL
KETONES URINE: NEGATIVE
LEUKOCYTE ESTERASE URINE: NEGATIVE
M TB IFN-G BLD-IMP: NEGATIVE
NITRITE URINE: NEGATIVE
PH URINE: >8.5
PROTEIN URINE: 30 MG/DL
QUANTIFERON GOLD NIL: 0.02 IU/ML
SPECIFIC GRAVITY URINE: 1.02
UROBILINOGEN URINE: 1 MG/DL

## 2018-04-08 LAB
CREAT SPEC-SCNC: 49 MG/DL
CREAT/PROT UR: 0.1 RATIO
PROT UR-MCNC: 7 MG/DL
TSH SERPL-ACNC: 4.24 UIU/ML

## 2018-04-13 ENCOUNTER — APPOINTMENT (OUTPATIENT)
Dept: OPHTHALMOLOGY | Facility: CLINIC | Age: 28
End: 2018-04-13

## 2018-04-18 ENCOUNTER — APPOINTMENT (OUTPATIENT)
Dept: RHEUMATOLOGY | Facility: CLINIC | Age: 28
End: 2018-04-18
Payer: COMMERCIAL

## 2018-04-18 ENCOUNTER — EMERGENCY (EMERGENCY)
Facility: HOSPITAL | Age: 28
LOS: 1 days | Discharge: HOME | End: 2018-04-18
Attending: EMERGENCY MEDICINE
Payer: COMMERCIAL

## 2018-04-18 VITALS
OXYGEN SATURATION: 99 % | DIASTOLIC BLOOD PRESSURE: 75 MMHG | WEIGHT: 186.95 LBS | RESPIRATION RATE: 19 BRPM | TEMPERATURE: 99 F | SYSTOLIC BLOOD PRESSURE: 118 MMHG | HEART RATE: 77 BPM | HEIGHT: 67 IN

## 2018-04-18 VITALS
DIASTOLIC BLOOD PRESSURE: 68 MMHG | HEART RATE: 101 BPM | HEIGHT: 67 IN | OXYGEN SATURATION: 98 % | SYSTOLIC BLOOD PRESSURE: 103 MMHG | RESPIRATION RATE: 14 BRPM | WEIGHT: 187 LBS | TEMPERATURE: 98 F | BODY MASS INDEX: 29.35 KG/M2

## 2018-04-18 DIAGNOSIS — Z98.89 OTHER SPECIFIED POSTPROCEDURAL STATES: Chronic | ICD-10-CM

## 2018-04-18 DIAGNOSIS — Z33.2 ENCOUNTER FOR ELECTIVE TERMINATION OF PREGNANCY: Chronic | ICD-10-CM

## 2018-04-18 LAB
ALBUMIN SERPL ELPH-MCNC: 4.3 G/DL — SIGNIFICANT CHANGE UP (ref 3.3–5)
ALP SERPL-CCNC: 81 U/L — SIGNIFICANT CHANGE UP (ref 40–120)
ALT FLD-CCNC: 5 U/L — LOW (ref 10–45)
ANION GAP SERPL CALC-SCNC: 13 MMOL/L — SIGNIFICANT CHANGE UP (ref 5–17)
APPEARANCE UR: CLEAR — SIGNIFICANT CHANGE UP
APTT BLD: 28.2 SEC — SIGNIFICANT CHANGE UP (ref 27.5–37.4)
AST SERPL-CCNC: 11 U/L — SIGNIFICANT CHANGE UP (ref 10–40)
BASOPHILS # BLD AUTO: 0.1 K/UL — SIGNIFICANT CHANGE UP (ref 0–0.2)
BASOPHILS NFR BLD AUTO: 1.2 % — SIGNIFICANT CHANGE UP (ref 0–2)
BILIRUB SERPL-MCNC: 0.2 MG/DL — SIGNIFICANT CHANGE UP (ref 0.2–1.2)
BILIRUB UR-MCNC: NEGATIVE — SIGNIFICANT CHANGE UP
BUN SERPL-MCNC: 17 MG/DL — SIGNIFICANT CHANGE UP (ref 7–23)
CALCIUM SERPL-MCNC: 9.6 MG/DL — SIGNIFICANT CHANGE UP (ref 8.4–10.5)
CHLORIDE SERPL-SCNC: 98 MMOL/L — SIGNIFICANT CHANGE UP (ref 96–108)
CK MB CFR SERPL CALC: <1 NG/ML — SIGNIFICANT CHANGE UP (ref 0–3.8)
CK SERPL-CCNC: 79 U/L — SIGNIFICANT CHANGE UP (ref 25–170)
CO2 SERPL-SCNC: 26 MMOL/L — SIGNIFICANT CHANGE UP (ref 22–31)
COLOR SPEC: YELLOW — SIGNIFICANT CHANGE UP
CREAT SERPL-MCNC: 0.64 MG/DL — SIGNIFICANT CHANGE UP (ref 0.5–1.3)
D DIMER BLD IA.RAPID-MCNC: 341 NG/ML DDU — HIGH
DIFF PNL FLD: NEGATIVE — SIGNIFICANT CHANGE UP
EOSINOPHIL # BLD AUTO: 0.2 K/UL — SIGNIFICANT CHANGE UP (ref 0–0.5)
EOSINOPHIL NFR BLD AUTO: 4.1 % — SIGNIFICANT CHANGE UP (ref 0–6)
GAS PNL BLDV: SIGNIFICANT CHANGE UP
GLUCOSE SERPL-MCNC: 98 MG/DL — SIGNIFICANT CHANGE UP (ref 70–99)
GLUCOSE UR QL: NEGATIVE — SIGNIFICANT CHANGE UP
HCG SERPL-ACNC: <2 MIU/ML — LOW (ref 5–24)
HCT VFR BLD CALC: 37.8 % — SIGNIFICANT CHANGE UP (ref 34.5–45)
HGB BLD-MCNC: 12.9 G/DL — SIGNIFICANT CHANGE UP (ref 11.5–15.5)
INR BLD: 1.12 RATIO — SIGNIFICANT CHANGE UP (ref 0.88–1.16)
KETONES UR-MCNC: NEGATIVE — SIGNIFICANT CHANGE UP
LEUKOCYTE ESTERASE UR-ACNC: NEGATIVE — SIGNIFICANT CHANGE UP
LYMPHOCYTES # BLD AUTO: 1.8 K/UL — SIGNIFICANT CHANGE UP (ref 1–3.3)
LYMPHOCYTES # BLD AUTO: 42.1 % — SIGNIFICANT CHANGE UP (ref 13–44)
MCHC RBC-ENTMCNC: 30.6 PG — SIGNIFICANT CHANGE UP (ref 27–34)
MCHC RBC-ENTMCNC: 34.2 GM/DL — SIGNIFICANT CHANGE UP (ref 32–36)
MCV RBC AUTO: 89.3 FL — SIGNIFICANT CHANGE UP (ref 80–100)
MONOCYTES # BLD AUTO: 0.4 K/UL — SIGNIFICANT CHANGE UP (ref 0–0.9)
MONOCYTES NFR BLD AUTO: 8.5 % — SIGNIFICANT CHANGE UP (ref 2–14)
NEUTROPHILS # BLD AUTO: 1.9 K/UL — SIGNIFICANT CHANGE UP (ref 1.8–7.4)
NEUTROPHILS NFR BLD AUTO: 44.1 % — SIGNIFICANT CHANGE UP (ref 43–77)
NITRITE UR-MCNC: NEGATIVE — SIGNIFICANT CHANGE UP
PH UR: 6 — SIGNIFICANT CHANGE UP (ref 5–8)
PLATELET # BLD AUTO: 356 K/UL — SIGNIFICANT CHANGE UP (ref 150–400)
POTASSIUM SERPL-MCNC: 3.2 MMOL/L — LOW (ref 3.5–5.3)
POTASSIUM SERPL-SCNC: 3.2 MMOL/L — LOW (ref 3.5–5.3)
PROT SERPL-MCNC: 8 G/DL — SIGNIFICANT CHANGE UP (ref 6–8.3)
PROT UR-MCNC: SIGNIFICANT CHANGE UP
PROTHROM AB SERPL-ACNC: 12.2 SEC — SIGNIFICANT CHANGE UP (ref 9.8–12.7)
RBC # BLD: 4.23 M/UL — SIGNIFICANT CHANGE UP (ref 3.8–5.2)
RBC # FLD: 12.9 % — SIGNIFICANT CHANGE UP (ref 10.3–14.5)
SODIUM SERPL-SCNC: 137 MMOL/L — SIGNIFICANT CHANGE UP (ref 135–145)
SP GR SPEC: 1.03 — HIGH (ref 1.01–1.02)
TROPONIN T SERPL-MCNC: <0.01 NG/ML — SIGNIFICANT CHANGE UP (ref 0–0.06)
UROBILINOGEN FLD QL: NEGATIVE — SIGNIFICANT CHANGE UP
WBC # BLD: 4.4 K/UL — SIGNIFICANT CHANGE UP (ref 3.8–10.5)
WBC # FLD AUTO: 4.4 K/UL — SIGNIFICANT CHANGE UP (ref 3.8–10.5)

## 2018-04-18 PROCEDURE — 99284 EMERGENCY DEPT VISIT MOD MDM: CPT

## 2018-04-18 PROCEDURE — 85014 HEMATOCRIT: CPT

## 2018-04-18 PROCEDURE — 71046 X-RAY EXAM CHEST 2 VIEWS: CPT | Mod: 26

## 2018-04-18 PROCEDURE — 82435 ASSAY OF BLOOD CHLORIDE: CPT

## 2018-04-18 PROCEDURE — 80053 COMPREHEN METABOLIC PANEL: CPT

## 2018-04-18 PROCEDURE — 82803 BLOOD GASES ANY COMBINATION: CPT

## 2018-04-18 PROCEDURE — 82330 ASSAY OF CALCIUM: CPT

## 2018-04-18 PROCEDURE — 82550 ASSAY OF CK (CPK): CPT

## 2018-04-18 PROCEDURE — 81003 URINALYSIS AUTO W/O SCOPE: CPT

## 2018-04-18 PROCEDURE — 84702 CHORIONIC GONADOTROPIN TEST: CPT

## 2018-04-18 PROCEDURE — 84132 ASSAY OF SERUM POTASSIUM: CPT

## 2018-04-18 PROCEDURE — 84484 ASSAY OF TROPONIN QUANT: CPT

## 2018-04-18 PROCEDURE — 85730 THROMBOPLASTIN TIME PARTIAL: CPT

## 2018-04-18 PROCEDURE — 82947 ASSAY GLUCOSE BLOOD QUANT: CPT

## 2018-04-18 PROCEDURE — 83605 ASSAY OF LACTIC ACID: CPT

## 2018-04-18 PROCEDURE — 82553 CREATINE MB FRACTION: CPT

## 2018-04-18 PROCEDURE — 85027 COMPLETE CBC AUTOMATED: CPT

## 2018-04-18 PROCEDURE — 84295 ASSAY OF SERUM SODIUM: CPT

## 2018-04-18 PROCEDURE — 71046 X-RAY EXAM CHEST 2 VIEWS: CPT

## 2018-04-18 PROCEDURE — 99285 EMERGENCY DEPT VISIT HI MDM: CPT

## 2018-04-18 PROCEDURE — 71275 CT ANGIOGRAPHY CHEST: CPT | Mod: 26

## 2018-04-18 PROCEDURE — 85379 FIBRIN DEGRADATION QUANT: CPT

## 2018-04-18 PROCEDURE — 85610 PROTHROMBIN TIME: CPT

## 2018-04-18 PROCEDURE — 71275 CT ANGIOGRAPHY CHEST: CPT

## 2018-04-18 PROCEDURE — 99214 OFFICE O/P EST MOD 30 MIN: CPT

## 2018-04-18 RX ADMIN — Medication 40 MILLIGRAM(S): at 22:25

## 2018-04-18 NOTE — ED PROVIDER NOTE - PROGRESS NOTE DETAILS
Pt was reassessed, feeling better, No PE on CTA, pt is stable and will be discharged to continue steroid taper. Dr. Burns: Pt signed out to me pendign CTA to r/o PE. Pt w hx of SLE w pericarditis. EKG not consistent w pericarditis. No signif pericardial effusion of CT. CT neg for PE. Pt advised to continue Rx for steroids given by her rheum and FU in next 1-2 days. Return for worsening. Copy of results given.

## 2018-04-18 NOTE — ED PROVIDER NOTE - ATTENDING CONTRIBUTION TO CARE
Patient presenting with pleuritic chest pains.  History of similar prior pains due to pericarditis,  history of Lupus.  Rhematologist sent patient to Emergency Department for r/o PE.  Written to start steroid taper, prescribed, but not yet started.  Pain sharp, improves with NSAIDs but does not resolved, worse with moving/deep inspiration    On exam patient well appearing, vital signs within normal limits, RRR S1/S2, lungs clear to ascultation bilaterally, chest wall tender to palpation, abdomen soft, non tender.    Likely recurrent pericarditis, wells score 3, will obtain labs, d-dimer, if positive CT angio.

## 2018-04-18 NOTE — ED ADULT NURSE NOTE - OBJECTIVE STATEMENT
29y/o female with history of SLE, hypothyroidism, emphysema and pericarditis walked into ED a&ox3 with multiple complaints. Patient sent in by Rheumatologist for concerns for PE. Patient reports she has had intermittent sharp CP for 2 weeks, radiating to the left side. Also c/o SOB and mild nausea but no vomiting. Patient has history of leg swelling but they have not been swollen more then usual lately. Denies history of clots, recent travel, fever, chills, cough. Lungs clear b/l. Abd soft, nontender, nondistended. LORIE. MD at bedside for eval. Safety and comfort maintained.

## 2018-04-18 NOTE — ED PROVIDER NOTE - OBJECTIVE STATEMENT
29yo F w/ pmhx of SLE, hypothyroidism, emphysema, hx of pericarditis sent in by Rheumatologist Dr. Nicole Friedman, for concerns for PE vs Pericarditis. Pt reports 2 weeks of substernal chest pain, radiating to the left side, pleuritic in nature, worst when lying flat, associated with SOB, mild nausea but no vomiting. Pt has hx of peripheral leg swelling, however they have not been swollen more then usual lately. No hx of blood clots, no recent travel, no fever/chills/productive cough. Denies any other symptoms.

## 2018-04-19 ENCOUNTER — APPOINTMENT (OUTPATIENT)
Dept: RHEUMATOLOGY | Facility: CLINIC | Age: 28
End: 2018-04-19

## 2018-04-19 VITALS
SYSTOLIC BLOOD PRESSURE: 113 MMHG | HEART RATE: 93 BPM | OXYGEN SATURATION: 100 % | TEMPERATURE: 99 F | DIASTOLIC BLOOD PRESSURE: 67 MMHG | RESPIRATION RATE: 17 BRPM

## 2018-04-24 ENCOUNTER — LABORATORY RESULT (OUTPATIENT)
Age: 28
End: 2018-04-24

## 2018-04-24 ENCOUNTER — APPOINTMENT (OUTPATIENT)
Dept: RHEUMATOLOGY | Facility: CLINIC | Age: 28
End: 2018-04-24
Payer: COMMERCIAL

## 2018-04-24 PROCEDURE — 96413 CHEMO IV INFUSION 1 HR: CPT

## 2018-04-24 PROCEDURE — 36415 COLL VENOUS BLD VENIPUNCTURE: CPT

## 2018-04-25 ENCOUNTER — RX RENEWAL (OUTPATIENT)
Age: 28
End: 2018-04-25

## 2018-04-29 ENCOUNTER — FORM ENCOUNTER (OUTPATIENT)
Age: 28
End: 2018-04-29

## 2018-04-30 ENCOUNTER — OUTPATIENT (OUTPATIENT)
Dept: OUTPATIENT SERVICES | Facility: HOSPITAL | Age: 28
LOS: 1 days | End: 2018-04-30
Payer: COMMERCIAL

## 2018-04-30 ENCOUNTER — APPOINTMENT (OUTPATIENT)
Dept: CT IMAGING | Facility: CLINIC | Age: 28
End: 2018-04-30
Payer: COMMERCIAL

## 2018-04-30 DIAGNOSIS — Z98.89 OTHER SPECIFIED POSTPROCEDURAL STATES: Chronic | ICD-10-CM

## 2018-04-30 DIAGNOSIS — Z33.2 ENCOUNTER FOR ELECTIVE TERMINATION OF PREGNANCY: Chronic | ICD-10-CM

## 2018-04-30 DIAGNOSIS — R10.2 PELVIC AND PERINEAL PAIN: ICD-10-CM

## 2018-04-30 PROCEDURE — 72193 CT PELVIS W/DYE: CPT

## 2018-04-30 PROCEDURE — 72193 CT PELVIS W/DYE: CPT | Mod: 26

## 2018-05-01 ENCOUNTER — FORM ENCOUNTER (OUTPATIENT)
Age: 28
End: 2018-05-01

## 2018-05-02 ENCOUNTER — OUTPATIENT (OUTPATIENT)
Dept: OUTPATIENT SERVICES | Facility: HOSPITAL | Age: 28
LOS: 1 days | End: 2018-05-02
Payer: COMMERCIAL

## 2018-05-02 ENCOUNTER — RX RENEWAL (OUTPATIENT)
Age: 28
End: 2018-05-02

## 2018-05-02 ENCOUNTER — FORM ENCOUNTER (OUTPATIENT)
Age: 28
End: 2018-05-02

## 2018-05-02 ENCOUNTER — APPOINTMENT (OUTPATIENT)
Dept: ULTRASOUND IMAGING | Facility: HOSPITAL | Age: 28
End: 2018-05-02

## 2018-05-02 ENCOUNTER — TRANSCRIPTION ENCOUNTER (OUTPATIENT)
Age: 28
End: 2018-05-02

## 2018-05-02 DIAGNOSIS — Z98.89 OTHER SPECIFIED POSTPROCEDURAL STATES: Chronic | ICD-10-CM

## 2018-05-02 DIAGNOSIS — R10.2 PELVIC AND PERINEAL PAIN: ICD-10-CM

## 2018-05-02 DIAGNOSIS — Z33.2 ENCOUNTER FOR ELECTIVE TERMINATION OF PREGNANCY: Chronic | ICD-10-CM

## 2018-05-02 DIAGNOSIS — R10.9 UNSPECIFIED ABDOMINAL PAIN: ICD-10-CM

## 2018-05-02 PROCEDURE — 76830 TRANSVAGINAL US NON-OB: CPT

## 2018-05-02 PROCEDURE — 93976 VASCULAR STUDY: CPT | Mod: 26

## 2018-05-02 PROCEDURE — 76830 TRANSVAGINAL US NON-OB: CPT | Mod: 26

## 2018-05-02 PROCEDURE — 76700 US EXAM ABDOM COMPLETE: CPT | Mod: 26,59

## 2018-05-02 PROCEDURE — 76700 US EXAM ABDOM COMPLETE: CPT

## 2018-05-02 PROCEDURE — 93975 VASCULAR STUDY: CPT

## 2018-05-03 ENCOUNTER — RX RENEWAL (OUTPATIENT)
Age: 28
End: 2018-05-03

## 2018-05-03 ENCOUNTER — FORM ENCOUNTER (OUTPATIENT)
Age: 28
End: 2018-05-03

## 2018-05-03 ENCOUNTER — OUTPATIENT (OUTPATIENT)
Dept: OUTPATIENT SERVICES | Facility: HOSPITAL | Age: 28
LOS: 1 days | End: 2018-05-03
Payer: COMMERCIAL

## 2018-05-03 ENCOUNTER — APPOINTMENT (OUTPATIENT)
Dept: ENDOCRINOLOGY | Facility: CLINIC | Age: 28
End: 2018-05-03
Payer: COMMERCIAL

## 2018-05-03 ENCOUNTER — APPOINTMENT (OUTPATIENT)
Dept: RHEUMATOLOGY | Facility: CLINIC | Age: 28
End: 2018-05-03
Payer: COMMERCIAL

## 2018-05-03 ENCOUNTER — APPOINTMENT (OUTPATIENT)
Dept: CT IMAGING | Facility: CLINIC | Age: 28
End: 2018-05-03
Payer: COMMERCIAL

## 2018-05-03 VITALS
OXYGEN SATURATION: 98 % | WEIGHT: 186 LBS | DIASTOLIC BLOOD PRESSURE: 80 MMHG | BODY MASS INDEX: 29.19 KG/M2 | SYSTOLIC BLOOD PRESSURE: 120 MMHG | HEART RATE: 84 BPM | HEIGHT: 67 IN

## 2018-05-03 DIAGNOSIS — Z33.2 ENCOUNTER FOR ELECTIVE TERMINATION OF PREGNANCY: Chronic | ICD-10-CM

## 2018-05-03 DIAGNOSIS — R10.9 UNSPECIFIED ABDOMINAL PAIN: ICD-10-CM

## 2018-05-03 DIAGNOSIS — Z98.89 OTHER SPECIFIED POSTPROCEDURAL STATES: Chronic | ICD-10-CM

## 2018-05-03 PROCEDURE — 74176 CT ABD & PELVIS W/O CONTRAST: CPT

## 2018-05-03 PROCEDURE — 99213 OFFICE O/P EST LOW 20 MIN: CPT

## 2018-05-03 PROCEDURE — 99214 OFFICE O/P EST MOD 30 MIN: CPT

## 2018-05-03 PROCEDURE — 74176 CT ABD & PELVIS W/O CONTRAST: CPT | Mod: 26

## 2018-05-03 RX ORDER — PREDNISONE 20 MG/1
20 TABLET ORAL TWICE DAILY
Qty: 10 | Refills: 0 | Status: DISCONTINUED | COMMUNITY
Start: 2018-04-18 | End: 2018-05-03

## 2018-05-03 RX ORDER — DOXYCYCLINE HYCLATE 100 MG/1
100 CAPSULE ORAL
Qty: 20 | Refills: 0 | Status: DISCONTINUED | COMMUNITY
Start: 2018-04-18 | End: 2018-05-03

## 2018-05-04 ENCOUNTER — APPOINTMENT (OUTPATIENT)
Dept: ULTRASOUND IMAGING | Facility: CLINIC | Age: 28
End: 2018-05-04
Payer: COMMERCIAL

## 2018-05-04 ENCOUNTER — OUTPATIENT (OUTPATIENT)
Dept: OUTPATIENT SERVICES | Facility: HOSPITAL | Age: 28
LOS: 1 days | End: 2018-05-04
Payer: COMMERCIAL

## 2018-05-04 DIAGNOSIS — Z98.89 OTHER SPECIFIED POSTPROCEDURAL STATES: Chronic | ICD-10-CM

## 2018-05-04 DIAGNOSIS — Z00.8 ENCOUNTER FOR OTHER GENERAL EXAMINATION: ICD-10-CM

## 2018-05-04 DIAGNOSIS — Z33.2 ENCOUNTER FOR ELECTIVE TERMINATION OF PREGNANCY: Chronic | ICD-10-CM

## 2018-05-04 PROCEDURE — 76536 US EXAM OF HEAD AND NECK: CPT | Mod: 26

## 2018-05-04 PROCEDURE — 76536 US EXAM OF HEAD AND NECK: CPT

## 2018-05-08 NOTE — ED ADULT NURSE REASSESSMENT NOTE - NS ED NURSE REASSESS COMMENT FT1
Have discussed with Dr Christo Crum that pt continues to have labored breathing. Has hx of Pulmonary HTN. O2 Sat 97 % on the 2 L NC.  Pt's wife to pt's bedside explaining that pt's condition has been declining since 11//17. Noticing more generalized weakness for wks. Labored breathing since this past Thursday per pt's wife. Dr Christo Crum is aware. Patient resting in bed sleeping. She states her headache has almost resolved. She rates pain 2/10. She denies toradol stating her pain is tolerable. She states she does not feel drowsy, and will go to her previously scheduled neurology appointment. Patient to be discharged.

## 2018-05-11 ENCOUNTER — APPOINTMENT (OUTPATIENT)
Dept: OBGYN | Facility: CLINIC | Age: 28
End: 2018-05-11
Payer: COMMERCIAL

## 2018-05-11 ENCOUNTER — LABORATORY RESULT (OUTPATIENT)
Age: 28
End: 2018-05-11

## 2018-05-11 VITALS
SYSTOLIC BLOOD PRESSURE: 112 MMHG | HEIGHT: 67 IN | WEIGHT: 182 LBS | BODY MASS INDEX: 28.56 KG/M2 | DIASTOLIC BLOOD PRESSURE: 72 MMHG

## 2018-05-11 DIAGNOSIS — Z87.19 PERSONAL HISTORY OF OTHER DISEASES OF THE DIGESTIVE SYSTEM: ICD-10-CM

## 2018-05-11 PROCEDURE — 99395 PREV VISIT EST AGE 18-39: CPT

## 2018-05-11 RX ORDER — FLUCONAZOLE 150 MG/1
150 TABLET ORAL
Qty: 2 | Refills: 3 | Status: COMPLETED | COMMUNITY
Start: 2018-05-03 | End: 2018-05-11

## 2018-05-11 RX ORDER — CIPROFLOXACIN HYDROCHLORIDE 500 MG/1
500 TABLET, FILM COATED ORAL
Qty: 10 | Refills: 1 | Status: COMPLETED | COMMUNITY
Start: 2018-05-02 | End: 2018-05-11

## 2018-05-13 LAB
C TRACH RRNA SPEC QL NAA+PROBE: NOT DETECTED
CANDIDA VAG CYTO: DETECTED
G VAGINALIS+PREV SP MTYP VAG QL MICRO: NOT DETECTED
N GONORRHOEA RRNA SPEC QL NAA+PROBE: NOT DETECTED
SOURCE AMPLIFICATION: NORMAL
T VAGINALIS VAG QL WET PREP: NOT DETECTED

## 2018-05-21 ENCOUNTER — APPOINTMENT (OUTPATIENT)
Dept: PAIN MANAGEMENT | Facility: CLINIC | Age: 28
End: 2018-05-21

## 2018-05-21 LAB — CYTOLOGY CVX/VAG DOC THIN PREP: NORMAL

## 2018-05-22 ENCOUNTER — LABORATORY RESULT (OUTPATIENT)
Age: 28
End: 2018-05-22

## 2018-05-22 ENCOUNTER — APPOINTMENT (OUTPATIENT)
Dept: RHEUMATOLOGY | Facility: CLINIC | Age: 28
End: 2018-05-22
Payer: COMMERCIAL

## 2018-05-22 PROCEDURE — 36415 COLL VENOUS BLD VENIPUNCTURE: CPT

## 2018-05-22 PROCEDURE — 96413 CHEMO IV INFUSION 1 HR: CPT

## 2018-05-23 ENCOUNTER — RX RENEWAL (OUTPATIENT)
Age: 28
End: 2018-05-23

## 2018-05-23 RX ORDER — PREDNISONE 20 MG/1
20 TABLET ORAL
Qty: 30 | Refills: 0 | Status: DISCONTINUED | COMMUNITY
Start: 2018-05-04 | End: 2018-05-23

## 2018-05-23 RX ORDER — OXYCODONE AND ACETAMINOPHEN 5; 325 MG/1; MG/1
5-325 TABLET ORAL
Qty: 40 | Refills: 0 | Status: DISCONTINUED | COMMUNITY
Start: 2018-05-03 | End: 2018-05-23

## 2018-05-23 RX ORDER — LEUCOVORIN CALCIUM 5 MG/1
5 TABLET ORAL
Qty: 4 | Refills: 5 | Status: DISCONTINUED | COMMUNITY
Start: 2017-10-14 | End: 2018-05-23

## 2018-06-05 ENCOUNTER — RX RENEWAL (OUTPATIENT)
Age: 28
End: 2018-06-05

## 2018-06-14 ENCOUNTER — LABORATORY RESULT (OUTPATIENT)
Age: 28
End: 2018-06-14

## 2018-06-15 LAB
T3RU NFR SERPL: 1.12 INDEX
T4 SERPL-MCNC: 7.4 UG/DL
TSH SERPL-ACNC: 2.05 UIU/ML

## 2018-06-18 ENCOUNTER — RX RENEWAL (OUTPATIENT)
Age: 28
End: 2018-06-18

## 2018-06-19 ENCOUNTER — LABORATORY RESULT (OUTPATIENT)
Age: 28
End: 2018-06-19

## 2018-06-19 ENCOUNTER — RX RENEWAL (OUTPATIENT)
Age: 28
End: 2018-06-19

## 2018-06-19 ENCOUNTER — APPOINTMENT (OUTPATIENT)
Dept: RHEUMATOLOGY | Facility: CLINIC | Age: 28
End: 2018-06-19
Payer: COMMERCIAL

## 2018-06-19 PROCEDURE — 36415 COLL VENOUS BLD VENIPUNCTURE: CPT

## 2018-06-19 PROCEDURE — 96413 CHEMO IV INFUSION 1 HR: CPT

## 2018-06-26 ENCOUNTER — APPOINTMENT (OUTPATIENT)
Dept: OBGYN | Facility: CLINIC | Age: 28
End: 2018-06-26
Payer: COMMERCIAL

## 2018-06-26 ENCOUNTER — RX RENEWAL (OUTPATIENT)
Age: 28
End: 2018-06-26

## 2018-06-26 VITALS
WEIGHT: 182 LBS | BODY MASS INDEX: 28.56 KG/M2 | SYSTOLIC BLOOD PRESSURE: 101 MMHG | DIASTOLIC BLOOD PRESSURE: 71 MMHG | HEIGHT: 67 IN

## 2018-06-26 LAB — HPV HIGH+LOW RISK DNA PNL CVX: DETECTED

## 2018-06-26 PROCEDURE — 57454 BX/CURETT OF CERVIX W/SCOPE: CPT

## 2018-06-26 PROCEDURE — 81025 URINE PREGNANCY TEST: CPT

## 2018-06-26 RX ORDER — FLUCONAZOLE 150 MG/1
150 TABLET ORAL
Qty: 1 | Refills: 0 | Status: COMPLETED | COMMUNITY
Start: 2018-05-13 | End: 2018-06-26

## 2018-06-26 RX ORDER — ONDANSETRON 4 MG/1
4 TABLET, ORALLY DISINTEGRATING ORAL
Qty: 30 | Refills: 1 | Status: COMPLETED | COMMUNITY
Start: 2018-05-03 | End: 2018-06-26

## 2018-06-26 RX ORDER — OXYCODONE AND ACETAMINOPHEN 5; 325 MG/1; MG/1
5-325 TABLET ORAL
Qty: 80 | Refills: 0 | Status: COMPLETED | OUTPATIENT
Start: 2018-05-03 | End: 2018-06-26

## 2018-06-26 RX ORDER — OMEPRAZOLE 40 MG/1
40 CAPSULE, DELAYED RELEASE ORAL
Qty: 90 | Refills: 3 | Status: COMPLETED | COMMUNITY
Start: 2017-04-26 | End: 2018-06-26

## 2018-06-27 ENCOUNTER — APPOINTMENT (OUTPATIENT)
Dept: RHEUMATOLOGY | Facility: CLINIC | Age: 28
End: 2018-06-27
Payer: COMMERCIAL

## 2018-06-27 VITALS
RESPIRATION RATE: 14 BRPM | HEART RATE: 90 BPM | DIASTOLIC BLOOD PRESSURE: 79 MMHG | TEMPERATURE: 98 F | HEIGHT: 67 IN | OXYGEN SATURATION: 98 % | WEIGHT: 180 LBS | SYSTOLIC BLOOD PRESSURE: 126 MMHG | BODY MASS INDEX: 28.25 KG/M2

## 2018-06-27 DIAGNOSIS — R16.0 HEPATOMEGALY, NOT ELSEWHERE CLASSIFIED: ICD-10-CM

## 2018-06-27 PROCEDURE — 99215 OFFICE O/P EST HI 40 MIN: CPT

## 2018-06-27 RX ORDER — LEVOTHYROXINE SODIUM 0.05 MG/1
50 TABLET ORAL
Qty: 30 | Refills: 0 | Status: DISCONTINUED | COMMUNITY
Start: 2017-11-11 | End: 2018-06-27

## 2018-06-27 RX ORDER — BUDESONIDE AND FORMOTEROL FUMARATE DIHYDRATE 160; 4.5 UG/1; UG/1
160-4.5 AEROSOL RESPIRATORY (INHALATION) TWICE DAILY
Qty: 1 | Refills: 3 | Status: DISCONTINUED | COMMUNITY
Start: 2018-01-17 | End: 2018-06-27

## 2018-06-28 ENCOUNTER — LABORATORY RESULT (OUTPATIENT)
Age: 28
End: 2018-06-28

## 2018-06-28 LAB
25(OH)D3 SERPL-MCNC: 35.6 NG/ML
ALBUMIN SERPL ELPH-MCNC: 4.2 G/DL
ALP BLD-CCNC: 60 U/L
ALT SERPL-CCNC: 12 U/L
ANION GAP SERPL CALC-SCNC: 14 MMOL/L
APPEARANCE: CLEAR
AST SERPL-CCNC: 17 U/L
BASOPHILS # BLD AUTO: 0.03 K/UL
BASOPHILS NFR BLD AUTO: 0.7 %
BILIRUB SERPL-MCNC: 0.4 MG/DL
BILIRUBIN URINE: NEGATIVE
BLOOD URINE: NEGATIVE
BUN SERPL-MCNC: 19 MG/DL
C3 SERPL-MCNC: 121 MG/DL
C4 SERPL-MCNC: 20 MG/DL
CALCIUM SERPL-MCNC: 9.4 MG/DL
CHLORIDE SERPL-SCNC: 102 MMOL/L
CO2 SERPL-SCNC: 25 MMOL/L
COLOR: YELLOW
CREAT SERPL-MCNC: 0.66 MG/DL
CREAT SPEC-SCNC: 239 MG/DL
CREAT/PROT UR: 0.1 RATIO
CRP SERPL-MCNC: 0.7 MG/DL
EOSINOPHIL # BLD AUTO: 0.03 K/UL
EOSINOPHIL NFR BLD AUTO: 0.7 %
ERYTHROCYTE [SEDIMENTATION RATE] IN BLOOD BY WESTERGREN METHOD: 9 MM/HR
GLUCOSE QUALITATIVE U: NEGATIVE MG/DL
GLUCOSE SERPL-MCNC: 86 MG/DL
HCT VFR BLD CALC: 39 %
HGB BLD-MCNC: 12.9 G/DL
IMM GRANULOCYTES NFR BLD AUTO: 1.4 %
KETONES URINE: NEGATIVE
LEUKOCYTE ESTERASE URINE: NEGATIVE
LYMPHOCYTES # BLD AUTO: 1.51 K/UL
LYMPHOCYTES NFR BLD AUTO: 34.2 %
MAN DIFF?: NORMAL
MCHC RBC-ENTMCNC: 28.6 PG
MCHC RBC-ENTMCNC: 33.1 GM/DL
MCV RBC AUTO: 86.5 FL
MONOCYTES # BLD AUTO: 0.44 K/UL
MONOCYTES NFR BLD AUTO: 10 %
NEUTROPHILS # BLD AUTO: 2.34 K/UL
NEUTROPHILS NFR BLD AUTO: 53 %
NITRITE URINE: NEGATIVE
PH URINE: 5.5
PLATELET # BLD AUTO: 353 K/UL
POTASSIUM SERPL-SCNC: 3.7 MMOL/L
PROT SERPL-MCNC: 7.5 G/DL
PROT UR-MCNC: 14 MG/DL
PROT UR-MCNC: 14 MG/DL
PROTEIN URINE: NEGATIVE MG/DL
RBC # BLD: 4.51 M/UL
RBC # FLD: 15.6 %
RHEUMATOID FACT SER QL: <7 IU/ML
SODIUM SERPL-SCNC: 141 MMOL/L
SPECIFIC GRAVITY URINE: 1.03
UROBILINOGEN URINE: NEGATIVE MG/DL
WBC # FLD AUTO: 4.41 K/UL

## 2018-06-29 LAB
B BURGDOR IGG+IGM SER QL IB: NORMAL
CORE LAB BIOPSY: NORMAL
DSDNA AB SER-ACNC: <12 IU/ML
ENA RNP AB SER IA-ACNC: <0.2 AL
ENA SCL70 IGG SER IA-ACNC: <0.2 AL
ENA SM AB SER IA-ACNC: <0.2 AL
ENA SS-A AB SER IA-ACNC: <0.2 AL
ENA SS-B AB SER IA-ACNC: <0.2 AL
MPO AB + PR3 PNL SER: NORMAL

## 2018-06-30 LAB
ANA PAT FLD IF-IMP: ABNORMAL
ANA SER IF-ACNC: ABNORMAL

## 2018-07-02 ENCOUNTER — RX RENEWAL (OUTPATIENT)
Age: 28
End: 2018-07-02

## 2018-07-02 LAB
CCP AB SER IA-ACNC: <8 UNITS
RF+CCP IGG SER-IMP: NEGATIVE

## 2018-07-09 ENCOUNTER — FORM ENCOUNTER (OUTPATIENT)
Age: 28
End: 2018-07-09

## 2018-07-10 ENCOUNTER — OUTPATIENT (OUTPATIENT)
Dept: OUTPATIENT SERVICES | Facility: HOSPITAL | Age: 28
LOS: 1 days | End: 2018-07-10
Payer: COMMERCIAL

## 2018-07-10 ENCOUNTER — APPOINTMENT (OUTPATIENT)
Dept: ULTRASOUND IMAGING | Facility: CLINIC | Age: 28
End: 2018-07-10
Payer: COMMERCIAL

## 2018-07-10 DIAGNOSIS — Z33.2 ENCOUNTER FOR ELECTIVE TERMINATION OF PREGNANCY: Chronic | ICD-10-CM

## 2018-07-10 DIAGNOSIS — Z98.89 OTHER SPECIFIED POSTPROCEDURAL STATES: Chronic | ICD-10-CM

## 2018-07-10 DIAGNOSIS — Z00.8 ENCOUNTER FOR OTHER GENERAL EXAMINATION: ICD-10-CM

## 2018-07-10 PROCEDURE — 76882 US LMTD JT/FCL EVL NVASC XTR: CPT

## 2018-07-10 PROCEDURE — 76882 US LMTD JT/FCL EVL NVASC XTR: CPT | Mod: 26,LT

## 2018-07-17 ENCOUNTER — RX RENEWAL (OUTPATIENT)
Age: 28
End: 2018-07-17

## 2018-07-17 ENCOUNTER — LABORATORY RESULT (OUTPATIENT)
Age: 28
End: 2018-07-17

## 2018-07-17 ENCOUNTER — APPOINTMENT (OUTPATIENT)
Dept: RHEUMATOLOGY | Facility: CLINIC | Age: 28
End: 2018-07-17
Payer: COMMERCIAL

## 2018-07-17 PROCEDURE — 96413 CHEMO IV INFUSION 1 HR: CPT

## 2018-07-17 PROCEDURE — 36415 COLL VENOUS BLD VENIPUNCTURE: CPT

## 2018-07-20 PROBLEM — M06.9 RHEUMATOID ARTHRITIS, UNSPECIFIED: Chronic | Status: ACTIVE | Noted: 2017-08-19

## 2018-07-20 PROBLEM — M32.9 SYSTEMIC LUPUS ERYTHEMATOSUS, UNSPECIFIED: Chronic | Status: ACTIVE | Noted: 2017-08-19

## 2018-07-23 ENCOUNTER — RX RENEWAL (OUTPATIENT)
Age: 28
End: 2018-07-23

## 2018-08-07 ENCOUNTER — FORM ENCOUNTER (OUTPATIENT)
Age: 28
End: 2018-08-07

## 2018-08-08 ENCOUNTER — OUTPATIENT (OUTPATIENT)
Dept: OUTPATIENT SERVICES | Facility: HOSPITAL | Age: 28
LOS: 1 days | End: 2018-08-08
Payer: COMMERCIAL

## 2018-08-08 ENCOUNTER — APPOINTMENT (OUTPATIENT)
Dept: ULTRASOUND IMAGING | Facility: HOSPITAL | Age: 28
End: 2018-08-08
Payer: COMMERCIAL

## 2018-08-08 DIAGNOSIS — Z98.89 OTHER SPECIFIED POSTPROCEDURAL STATES: Chronic | ICD-10-CM

## 2018-08-08 DIAGNOSIS — R10.9 UNSPECIFIED ABDOMINAL PAIN: ICD-10-CM

## 2018-08-08 DIAGNOSIS — Z33.2 ENCOUNTER FOR ELECTIVE TERMINATION OF PREGNANCY: Chronic | ICD-10-CM

## 2018-08-08 DIAGNOSIS — R22.1 LOCALIZED SWELLING, MASS AND LUMP, NECK: ICD-10-CM

## 2018-08-08 PROCEDURE — 76536 US EXAM OF HEAD AND NECK: CPT

## 2018-08-08 PROCEDURE — 76536 US EXAM OF HEAD AND NECK: CPT | Mod: 26

## 2018-08-14 ENCOUNTER — APPOINTMENT (OUTPATIENT)
Dept: RHEUMATOLOGY | Facility: CLINIC | Age: 28
End: 2018-08-14
Payer: COMMERCIAL

## 2018-08-14 ENCOUNTER — LABORATORY RESULT (OUTPATIENT)
Age: 28
End: 2018-08-14

## 2018-08-14 PROCEDURE — 36415 COLL VENOUS BLD VENIPUNCTURE: CPT

## 2018-08-14 PROCEDURE — 96413 CHEMO IV INFUSION 1 HR: CPT

## 2018-08-21 ENCOUNTER — FORM ENCOUNTER (OUTPATIENT)
Age: 28
End: 2018-08-21

## 2018-08-21 ENCOUNTER — RX RENEWAL (OUTPATIENT)
Age: 28
End: 2018-08-21

## 2018-08-22 ENCOUNTER — OUTPATIENT (OUTPATIENT)
Dept: OUTPATIENT SERVICES | Facility: HOSPITAL | Age: 28
LOS: 1 days | End: 2018-08-22
Payer: COMMERCIAL

## 2018-08-22 ENCOUNTER — APPOINTMENT (OUTPATIENT)
Dept: MRI IMAGING | Facility: CLINIC | Age: 28
End: 2018-08-22
Payer: COMMERCIAL

## 2018-08-22 DIAGNOSIS — Z98.89 OTHER SPECIFIED POSTPROCEDURAL STATES: Chronic | ICD-10-CM

## 2018-08-22 DIAGNOSIS — Z33.2 ENCOUNTER FOR ELECTIVE TERMINATION OF PREGNANCY: Chronic | ICD-10-CM

## 2018-08-22 DIAGNOSIS — M25.552 PAIN IN LEFT HIP: ICD-10-CM

## 2018-08-22 PROCEDURE — 73721 MRI JNT OF LWR EXTRE W/O DYE: CPT | Mod: 26,LT

## 2018-08-22 PROCEDURE — 73721 MRI JNT OF LWR EXTRE W/O DYE: CPT

## 2018-08-29 ENCOUNTER — APPOINTMENT (OUTPATIENT)
Dept: HEMATOLOGY ONCOLOGY | Facility: CLINIC | Age: 28
End: 2018-08-29
Payer: COMMERCIAL

## 2018-08-29 ENCOUNTER — OUTPATIENT (OUTPATIENT)
Dept: OUTPATIENT SERVICES | Facility: HOSPITAL | Age: 28
LOS: 1 days | Discharge: HOME | End: 2018-08-29

## 2018-08-29 ENCOUNTER — LABORATORY RESULT (OUTPATIENT)
Age: 28
End: 2018-08-29

## 2018-08-29 VITALS
RESPIRATION RATE: 16 BRPM | OXYGEN SATURATION: 100 % | DIASTOLIC BLOOD PRESSURE: 77 MMHG | WEIGHT: 187.39 LBS | SYSTOLIC BLOOD PRESSURE: 112 MMHG | HEART RATE: 82 BPM | BODY MASS INDEX: 29.35 KG/M2

## 2018-08-29 DIAGNOSIS — Z98.89 OTHER SPECIFIED POSTPROCEDURAL STATES: Chronic | ICD-10-CM

## 2018-08-29 DIAGNOSIS — R59.9 ENLARGED LYMPH NODES, UNSPECIFIED: ICD-10-CM

## 2018-08-29 DIAGNOSIS — Z33.2 ENCOUNTER FOR ELECTIVE TERMINATION OF PREGNANCY: Chronic | ICD-10-CM

## 2018-08-29 PROCEDURE — 99215 OFFICE O/P EST HI 40 MIN: CPT

## 2018-08-29 RX ORDER — IBUPROFEN 800 MG/1
800 TABLET, FILM COATED ORAL TWICE DAILY
Qty: 60 | Refills: 0 | Status: COMPLETED | COMMUNITY
Start: 2018-06-19 | End: 2018-08-29

## 2018-08-29 RX ORDER — ABATACEPT 250 MG/15ML
250 INJECTION, POWDER, LYOPHILIZED, FOR SOLUTION INTRAVENOUS
Qty: 1 | Refills: 6 | Status: COMPLETED | OUTPATIENT
Start: 2017-06-05 | End: 2018-08-29

## 2018-08-29 RX ORDER — PREDNISONE 20 MG/1
20 TABLET ORAL
Qty: 60 | Refills: 3 | Status: COMPLETED | COMMUNITY
Start: 2018-06-18 | End: 2018-08-29

## 2018-09-03 ENCOUNTER — FORM ENCOUNTER (OUTPATIENT)
Age: 28
End: 2018-09-03

## 2018-09-04 ENCOUNTER — APPOINTMENT (OUTPATIENT)
Dept: ULTRASOUND IMAGING | Facility: HOSPITAL | Age: 28
End: 2018-09-04
Payer: COMMERCIAL

## 2018-09-04 ENCOUNTER — APPOINTMENT (OUTPATIENT)
Dept: ORTHOPEDIC SURGERY | Facility: CLINIC | Age: 28
End: 2018-09-04
Payer: COMMERCIAL

## 2018-09-04 ENCOUNTER — OUTPATIENT (OUTPATIENT)
Dept: OUTPATIENT SERVICES | Facility: HOSPITAL | Age: 28
LOS: 1 days | End: 2018-09-04
Payer: COMMERCIAL

## 2018-09-04 VITALS — BODY MASS INDEX: 29.19 KG/M2 | HEIGHT: 67 IN | WEIGHT: 186 LBS

## 2018-09-04 DIAGNOSIS — R59.0 LOCALIZED ENLARGED LYMPH NODES: ICD-10-CM

## 2018-09-04 DIAGNOSIS — Z33.2 ENCOUNTER FOR ELECTIVE TERMINATION OF PREGNANCY: Chronic | ICD-10-CM

## 2018-09-04 DIAGNOSIS — Z98.89 OTHER SPECIFIED POSTPROCEDURAL STATES: Chronic | ICD-10-CM

## 2018-09-04 PROCEDURE — 73502 X-RAY EXAM HIP UNI 2-3 VIEWS: CPT | Mod: LT

## 2018-09-04 PROCEDURE — 99214 OFFICE O/P EST MOD 30 MIN: CPT

## 2018-09-04 PROCEDURE — 76536 US EXAM OF HEAD AND NECK: CPT

## 2018-09-04 PROCEDURE — 76536 US EXAM OF HEAD AND NECK: CPT | Mod: 26

## 2018-09-10 ENCOUNTER — APPOINTMENT (OUTPATIENT)
Dept: INTERNAL MEDICINE | Facility: CLINIC | Age: 28
End: 2018-09-10
Payer: COMMERCIAL

## 2018-09-10 VITALS
WEIGHT: 180 LBS | BODY MASS INDEX: 28.25 KG/M2 | RESPIRATION RATE: 24 BRPM | HEART RATE: 110 BPM | SYSTOLIC BLOOD PRESSURE: 118 MMHG | HEIGHT: 67 IN | DIASTOLIC BLOOD PRESSURE: 84 MMHG | TEMPERATURE: 98.6 F | OXYGEN SATURATION: 98 %

## 2018-09-10 DIAGNOSIS — F41.9 ANXIETY DISORDER, UNSPECIFIED: ICD-10-CM

## 2018-09-10 PROCEDURE — 99214 OFFICE O/P EST MOD 30 MIN: CPT

## 2018-09-10 NOTE — HISTORY OF PRESENT ILLNESS
[FreeTextEntry8] : Patient went to Urgent Care on 2 days ago for sinus pressure and congestion, eye orbit pain and "vise-like" headache with accompanying fatigue and aches.  She was offered Z-Pack and/or Tamiflu ? but declined both.\viky Has had intermittent low grade fever, chills and mild post nasal drip.\viky This morning developed mild cough but more problematic throat sore causing pain to swallow and onset of hoarseness.\viky Has been taking advil cold and sinus remedy which helps sinus pressure.\viky Reports posterior cervical lymph nod on left "angrier than usual."  She is under the care of Heme-Onc for chronic inflamed lymph node.

## 2018-09-10 NOTE — REVIEW OF SYSTEMS
[Fever] : fever [Chills] : chills [Fatigue] : fatigue [Earache] : no earache [Hoarseness] : hoarseness [Postnasal Drip] : postnasal drip [Joint Pain] : joint pain [Negative] : Heme/Lymph [FreeTextEntry6] : see HPI

## 2018-09-10 NOTE — PHYSICAL EXAM
[No Acute Distress] : no acute distress [Well Nourished] : well nourished [Well Developed] : well developed [Normal Sclera/Conjunctiva] : normal sclera/conjunctiva [Supple] : supple [No Respiratory Distress] : no respiratory distress  [Clear to Auscultation] : lungs were clear to auscultation bilaterally [Normal Rate] : normal rate  [Regular Rhythm] : with a regular rhythm [Normal S1, S2] : normal S1 and S2 [No Edema] : there was no peripheral edema [Soft] : abdomen soft [Non Tender] : non-tender [Normal Supraclavicular Nodes] : no supraclavicular lymphadenopathy [Normal Anterior Cervical Nodes] : no anterior cervical lymphadenopathy [No Spinal Tenderness] : no spinal tenderness [Grossly Normal Strength/Tone] : grossly normal strength/tone [No Rash] : no rash [No Focal Deficits] : no focal deficits [Normal Affect] : the affect was normal [de-identified] : Oropharynx with erythema, no exudates.  Tonsils are absent.  Mild serous bulge left TM. [de-identified] : Large, firm, mildly tender, posterior cervical lymph node on left, midway.  Small posterior cervical lymph node on right. [de-identified] : posterior cervical lymph nodes bilaterally, see ENT

## 2018-09-10 NOTE — PLAN
[FreeTextEntry1] : Doxycycline 100 mg 1 tab PO BID for 10 days\par Medrol dose pack as directed\par Start probiotic while on antibiotic therapy\par Continue current pulmonary medications as prescribed\par Continue follow up for lymphadenopathy with Heme-Onc.\par Call office if no resolve of symptoms\par To ED for emergent problems

## 2018-09-26 ENCOUNTER — APPOINTMENT (OUTPATIENT)
Dept: RHEUMATOLOGY | Facility: CLINIC | Age: 28
End: 2018-09-26
Payer: COMMERCIAL

## 2018-09-26 VITALS
SYSTOLIC BLOOD PRESSURE: 112 MMHG | HEIGHT: 67 IN | DIASTOLIC BLOOD PRESSURE: 72 MMHG | TEMPERATURE: 98.4 F | BODY MASS INDEX: 29.35 KG/M2 | HEART RATE: 98 BPM | WEIGHT: 187 LBS | RESPIRATION RATE: 18 BRPM | OXYGEN SATURATION: 98 %

## 2018-09-26 DIAGNOSIS — M25.859 OTHER SPECIFIED JOINT DISORDERS, UNSPECIFIED HIP: ICD-10-CM

## 2018-09-26 PROCEDURE — 99214 OFFICE O/P EST MOD 30 MIN: CPT

## 2018-09-26 RX ORDER — METHYLPREDNISOLONE 4 MG/1
4 TABLET ORAL
Qty: 1 | Refills: 0 | Status: DISCONTINUED | COMMUNITY
Start: 2018-09-10 | End: 2018-09-26

## 2018-09-26 RX ORDER — CETIRIZINE HYDROCHLORIDE 10 MG/1
10 CAPSULE, LIQUID FILLED ORAL
Qty: 1 | Refills: 6 | Status: DISCONTINUED | OUTPATIENT
Start: 2017-06-05 | End: 2018-09-26

## 2018-09-26 RX ORDER — DOXYCYCLINE 100 MG/1
100 CAPSULE ORAL
Qty: 20 | Refills: 0 | Status: DISCONTINUED | COMMUNITY
Start: 2018-09-10 | End: 2018-09-26

## 2018-09-26 RX ORDER — COLCHICINE 0.6 MG/1
0.6 CAPSULE ORAL
Refills: 0 | Status: DISCONTINUED | COMMUNITY
Start: 2018-05-04 | End: 2018-09-26

## 2018-09-28 LAB
APPEARANCE: CLEAR
BASOPHILS # BLD AUTO: 0.03 K/UL
BASOPHILS NFR BLD AUTO: 0.5 %
BILIRUBIN URINE: NEGATIVE
BLOOD URINE: NEGATIVE
COLOR: YELLOW
EOSINOPHIL # BLD AUTO: 0.08 K/UL
EOSINOPHIL NFR BLD AUTO: 1.4 %
ERYTHROCYTE [SEDIMENTATION RATE] IN BLOOD BY WESTERGREN METHOD: 14 MM/HR
GLUCOSE QUALITATIVE U: NEGATIVE MG/DL
HCT VFR BLD CALC: 37.2 %
HGB BLD-MCNC: 12.2 G/DL
IMM GRANULOCYTES NFR BLD AUTO: 0 %
KETONES URINE: NEGATIVE
LEUKOCYTE ESTERASE URINE: NEGATIVE
LYMPHOCYTES # BLD AUTO: 2.14 K/UL
LYMPHOCYTES NFR BLD AUTO: 38.4 %
MAN DIFF?: NORMAL
MCHC RBC-ENTMCNC: 29.4 PG
MCHC RBC-ENTMCNC: 32.8 GM/DL
MCV RBC AUTO: 89.6 FL
MONOCYTES # BLD AUTO: 0.69 K/UL
MONOCYTES NFR BLD AUTO: 12.4 %
NEUTROPHILS # BLD AUTO: 2.64 K/UL
NEUTROPHILS NFR BLD AUTO: 47.3 %
NITRITE URINE: NEGATIVE
PH URINE: 6.5
PLATELET # BLD AUTO: 352 K/UL
PROTEIN URINE: NEGATIVE MG/DL
RBC # BLD: 4.15 M/UL
RBC # FLD: 14.8 %
SPECIFIC GRAVITY URINE: 1.02
UROBILINOGEN URINE: NEGATIVE MG/DL
WBC # FLD AUTO: 5.58 K/UL

## 2018-09-30 PROBLEM — M25.859 FEMORAL ACETABULAR IMPINGEMENT: Status: ACTIVE | Noted: 2018-09-04

## 2018-09-30 LAB
25(OH)D3 SERPL-MCNC: 22.2 NG/ML
ALBUMIN SERPL ELPH-MCNC: 4.2 G/DL
ALP BLD-CCNC: 57 U/L
ALT SERPL-CCNC: 12 U/L
ANION GAP SERPL CALC-SCNC: 11 MMOL/L
AST SERPL-CCNC: 18 U/L
BILIRUB SERPL-MCNC: 0.3 MG/DL
BUN SERPL-MCNC: 10 MG/DL
CALCIUM SERPL-MCNC: 9.3 MG/DL
CHLORIDE SERPL-SCNC: 103 MMOL/L
CO2 SERPL-SCNC: 25 MMOL/L
CREAT SERPL-MCNC: 0.51 MG/DL
CREAT SPEC-SCNC: 153 MG/DL
CREAT/PROT UR: 0.1 RATIO
CRP SERPL-MCNC: 0.77 MG/DL
DEPRECATED KAPPA LC FREE/LAMBDA SER: 0.56 RATIO
FERRITIN SERPL-MCNC: 41 NG/ML
FOLATE SERPL-MCNC: 11.7 NG/ML
GLUCOSE SERPL-MCNC: 95 MG/DL
IGA SER QL IEP: 190 MG/DL
IGG SER QL IEP: 1104 MG/DL
IGM SER QL IEP: 66 MG/DL
IRON SATN MFR SERPL: 21 %
IRON SERPL-MCNC: 67 UG/DL
KAPPA LC CSF-MCNC: 2.35 MG/DL
KAPPA LC SERPL-MCNC: 1.31 MG/DL
POTASSIUM SERPL-SCNC: 4 MMOL/L
PROT SERPL-MCNC: 7.3 G/DL
PROT UR-MCNC: 13 MG/DL
SODIUM SERPL-SCNC: 139 MMOL/L
TIBC SERPL-MCNC: 322 UG/DL
TSH SERPL-ACNC: 3 UIU/ML
UIBC SERPL-MCNC: 255 UG/DL
VIT B12 SERPL-MCNC: 448 PG/ML

## 2018-10-02 LAB
ANA PAT FLD IF-IMP: ABNORMAL
ANA SER IF-ACNC: ABNORMAL

## 2018-10-09 ENCOUNTER — APPOINTMENT (OUTPATIENT)
Dept: RHEUMATOLOGY | Facility: CLINIC | Age: 28
End: 2018-10-09
Payer: COMMERCIAL

## 2018-10-09 PROCEDURE — 96413 CHEMO IV INFUSION 1 HR: CPT

## 2018-12-04 ENCOUNTER — APPOINTMENT (OUTPATIENT)
Dept: RHEUMATOLOGY | Facility: CLINIC | Age: 28
End: 2018-12-04
Payer: MEDICAID

## 2018-12-04 ENCOUNTER — LABORATORY RESULT (OUTPATIENT)
Age: 28
End: 2018-12-04

## 2018-12-04 PROCEDURE — 96413 CHEMO IV INFUSION 1 HR: CPT

## 2018-12-04 PROCEDURE — 36415 COLL VENOUS BLD VENIPUNCTURE: CPT

## 2018-12-24 PROBLEM — M32.9 LUPUS: Status: RESOLVED | Noted: 2017-12-06 | Resolved: 2018-12-24

## 2018-12-25 ENCOUNTER — EMERGENCY (EMERGENCY)
Facility: HOSPITAL | Age: 28
LOS: 0 days | Discharge: HOME | End: 2018-12-26
Attending: EMERGENCY MEDICINE | Admitting: EMERGENCY MEDICINE
Payer: MEDICAID

## 2018-12-25 VITALS
DIASTOLIC BLOOD PRESSURE: 68 MMHG | HEART RATE: 72 BPM | RESPIRATION RATE: 18 BRPM | OXYGEN SATURATION: 100 % | SYSTOLIC BLOOD PRESSURE: 111 MMHG

## 2018-12-25 VITALS — HEIGHT: 67 IN | WEIGHT: 184.97 LBS

## 2018-12-25 DIAGNOSIS — Z98.89 OTHER SPECIFIED POSTPROCEDURAL STATES: Chronic | ICD-10-CM

## 2018-12-25 DIAGNOSIS — Z91.030 BEE ALLERGY STATUS: ICD-10-CM

## 2018-12-25 DIAGNOSIS — R10.9 UNSPECIFIED ABDOMINAL PAIN: ICD-10-CM

## 2018-12-25 DIAGNOSIS — K52.9 NONINFECTIVE GASTROENTERITIS AND COLITIS, UNSPECIFIED: ICD-10-CM

## 2018-12-25 DIAGNOSIS — J45.909 UNSPECIFIED ASTHMA, UNCOMPLICATED: ICD-10-CM

## 2018-12-25 DIAGNOSIS — E03.9 HYPOTHYROIDISM, UNSPECIFIED: ICD-10-CM

## 2018-12-25 DIAGNOSIS — Z88.5 ALLERGY STATUS TO NARCOTIC AGENT: ICD-10-CM

## 2018-12-25 DIAGNOSIS — Z33.2 ENCOUNTER FOR ELECTIVE TERMINATION OF PREGNANCY: Chronic | ICD-10-CM

## 2018-12-25 DIAGNOSIS — F41.9 ANXIETY DISORDER, UNSPECIFIED: ICD-10-CM

## 2018-12-25 DIAGNOSIS — M32.9 SYSTEMIC LUPUS ERYTHEMATOSUS, UNSPECIFIED: ICD-10-CM

## 2018-12-25 DIAGNOSIS — E55.9 VITAMIN D DEFICIENCY, UNSPECIFIED: ICD-10-CM

## 2018-12-25 LAB
ALBUMIN SERPL ELPH-MCNC: 3.8 G/DL — SIGNIFICANT CHANGE UP (ref 3.3–5)
ALP SERPL-CCNC: 79 U/L — SIGNIFICANT CHANGE UP (ref 40–120)
ALT FLD-CCNC: 19 U/L — SIGNIFICANT CHANGE UP (ref 12–78)
ANION GAP SERPL CALC-SCNC: 9 MMOL/L — SIGNIFICANT CHANGE UP (ref 5–17)
AST SERPL-CCNC: 13 U/L — LOW (ref 15–37)
BASOPHILS # BLD AUTO: 0.02 K/UL — SIGNIFICANT CHANGE UP (ref 0–0.2)
BASOPHILS NFR BLD AUTO: 0.2 % — SIGNIFICANT CHANGE UP (ref 0–2)
BILIRUB SERPL-MCNC: 0.5 MG/DL — SIGNIFICANT CHANGE UP (ref 0.2–1.2)
BUN SERPL-MCNC: 16 MG/DL — SIGNIFICANT CHANGE UP (ref 7–23)
CALCIUM SERPL-MCNC: 8.6 MG/DL — SIGNIFICANT CHANGE UP (ref 8.5–10.1)
CHLORIDE SERPL-SCNC: 107 MMOL/L — SIGNIFICANT CHANGE UP (ref 96–108)
CO2 SERPL-SCNC: 23 MMOL/L — SIGNIFICANT CHANGE UP (ref 22–31)
CREAT SERPL-MCNC: 0.82 MG/DL — SIGNIFICANT CHANGE UP (ref 0.5–1.3)
EOSINOPHIL # BLD AUTO: 0 K/UL — SIGNIFICANT CHANGE UP (ref 0–0.5)
EOSINOPHIL NFR BLD AUTO: 0 % — SIGNIFICANT CHANGE UP (ref 0–6)
GLUCOSE SERPL-MCNC: 113 MG/DL — HIGH (ref 70–99)
HCT VFR BLD CALC: 41.2 % — SIGNIFICANT CHANGE UP (ref 34.5–45)
HGB BLD-MCNC: 13.8 G/DL — SIGNIFICANT CHANGE UP (ref 11.5–15.5)
IMM GRANULOCYTES NFR BLD AUTO: 0.2 % — SIGNIFICANT CHANGE UP (ref 0–1.5)
LACTATE SERPL-SCNC: 1 MMOL/L — SIGNIFICANT CHANGE UP (ref 0.7–2)
LACTATE SERPL-SCNC: 2.1 MMOL/L — HIGH (ref 0.7–2)
LIDOCAIN IGE QN: 62 U/L — LOW (ref 73–393)
LYMPHOCYTES # BLD AUTO: 1 K/UL — SIGNIFICANT CHANGE UP (ref 1–3.3)
LYMPHOCYTES # BLD AUTO: 12.1 % — LOW (ref 13–44)
MCHC RBC-ENTMCNC: 30.3 PG — SIGNIFICANT CHANGE UP (ref 27–34)
MCHC RBC-ENTMCNC: 33.5 GM/DL — SIGNIFICANT CHANGE UP (ref 32–36)
MCV RBC AUTO: 90.5 FL — SIGNIFICANT CHANGE UP (ref 80–100)
MONOCYTES # BLD AUTO: 0.28 K/UL — SIGNIFICANT CHANGE UP (ref 0–0.9)
MONOCYTES NFR BLD AUTO: 3.4 % — SIGNIFICANT CHANGE UP (ref 2–14)
NEUTROPHILS # BLD AUTO: 6.93 K/UL — SIGNIFICANT CHANGE UP (ref 1.8–7.4)
NEUTROPHILS NFR BLD AUTO: 84.1 % — HIGH (ref 43–77)
NRBC # BLD: 0 /100 WBCS — SIGNIFICANT CHANGE UP (ref 0–0)
PLATELET # BLD AUTO: 359 K/UL — SIGNIFICANT CHANGE UP (ref 150–400)
POTASSIUM SERPL-MCNC: 3.6 MMOL/L — SIGNIFICANT CHANGE UP (ref 3.5–5.3)
POTASSIUM SERPL-SCNC: 3.6 MMOL/L — SIGNIFICANT CHANGE UP (ref 3.5–5.3)
PROT SERPL-MCNC: 8.2 GM/DL — SIGNIFICANT CHANGE UP (ref 6–8.3)
RBC # BLD: 4.55 M/UL — SIGNIFICANT CHANGE UP (ref 3.8–5.2)
RBC # FLD: 14.6 % — HIGH (ref 10.3–14.5)
SODIUM SERPL-SCNC: 139 MMOL/L — SIGNIFICANT CHANGE UP (ref 135–145)
WBC # BLD: 8.25 K/UL — SIGNIFICANT CHANGE UP (ref 3.8–10.5)
WBC # FLD AUTO: 8.25 K/UL — SIGNIFICANT CHANGE UP (ref 3.8–10.5)

## 2018-12-25 PROCEDURE — 76705 ECHO EXAM OF ABDOMEN: CPT | Mod: 26

## 2018-12-25 PROCEDURE — 99285 EMERGENCY DEPT VISIT HI MDM: CPT | Mod: 25

## 2018-12-25 PROCEDURE — 74177 CT ABD & PELVIS W/CONTRAST: CPT | Mod: 26

## 2018-12-25 RX ORDER — SODIUM CHLORIDE 9 MG/ML
1000 INJECTION INTRAMUSCULAR; INTRAVENOUS; SUBCUTANEOUS ONCE
Qty: 0 | Refills: 0 | Status: COMPLETED | OUTPATIENT
Start: 2018-12-25 | End: 2018-12-25

## 2018-12-25 RX ORDER — ONDANSETRON 8 MG/1
4 TABLET, FILM COATED ORAL ONCE
Qty: 0 | Refills: 0 | Status: COMPLETED | OUTPATIENT
Start: 2018-12-25 | End: 2018-12-25

## 2018-12-25 RX ORDER — FAMOTIDINE 10 MG/ML
20 INJECTION INTRAVENOUS ONCE
Qty: 0 | Refills: 0 | Status: COMPLETED | OUTPATIENT
Start: 2018-12-25 | End: 2018-12-25

## 2018-12-25 RX ORDER — ONDANSETRON 8 MG/1
1 TABLET, FILM COATED ORAL
Qty: 9 | Refills: 0 | OUTPATIENT
Start: 2018-12-25 | End: 2018-12-27

## 2018-12-25 RX ORDER — SODIUM CHLORIDE 9 MG/ML
500 INJECTION INTRAMUSCULAR; INTRAVENOUS; SUBCUTANEOUS ONCE
Qty: 0 | Refills: 0 | Status: COMPLETED | OUTPATIENT
Start: 2018-12-25 | End: 2018-12-25

## 2018-12-25 RX ADMIN — SODIUM CHLORIDE 1000 MILLILITER(S): 9 INJECTION INTRAMUSCULAR; INTRAVENOUS; SUBCUTANEOUS at 19:45

## 2018-12-25 RX ADMIN — SODIUM CHLORIDE 2000 MILLILITER(S): 9 INJECTION INTRAMUSCULAR; INTRAVENOUS; SUBCUTANEOUS at 19:15

## 2018-12-25 RX ADMIN — SODIUM CHLORIDE 1000 MILLILITER(S): 9 INJECTION INTRAMUSCULAR; INTRAVENOUS; SUBCUTANEOUS at 20:15

## 2018-12-25 RX ADMIN — ONDANSETRON 4 MILLIGRAM(S): 8 TABLET, FILM COATED ORAL at 19:09

## 2018-12-25 RX ADMIN — ONDANSETRON 4 MILLIGRAM(S): 8 TABLET, FILM COATED ORAL at 23:18

## 2018-12-25 RX ADMIN — SODIUM CHLORIDE 2000 MILLILITER(S): 9 INJECTION INTRAMUSCULAR; INTRAVENOUS; SUBCUTANEOUS at 20:49

## 2018-12-25 RX ADMIN — SODIUM CHLORIDE 2000 MILLILITER(S): 9 INJECTION INTRAMUSCULAR; INTRAVENOUS; SUBCUTANEOUS at 18:47

## 2018-12-25 RX ADMIN — FAMOTIDINE 20 MILLIGRAM(S): 10 INJECTION INTRAVENOUS at 19:10

## 2018-12-25 NOTE — ED STATDOCS - OBJECTIVE STATEMENT
29 y/o female with a PMHx of undifferentiated connective tissue disease, asthma, anemia, migraines on Topamax, anxiety on Paxil, hypothyroidism on Levothyroxine, intussusception (2014, no surgical intervention) presents to the ED c/o intermittent upper abd pain, nausea, vomiting, diarrhea today. Pt unable to tolerate PO since yesterday. Pt states symptoms are similar to previous intussusception. No blood in stool or vomit. No chest pain, SOB. Didn't take Lasix today, on for previous long term prednisone use. Non smoker. Pt has IUD. 27 y/o female with a PMHx of undifferentiated connective tissue disease, asthma, anemia, migraines on Topamax, anxiety on Paxil, hypothyroidism on Levothyroxine, intussusception (2014, no surgical intervention) presents to the ED c/o intermittent upper abd pain, nausea, vomiting, diarrhea today. Pt unable to tolerate PO since yesterday. Pt states symptoms are similar to previous intussusception. No blood in stool or vomit. No chest pain, SOB. LE edema bilateral slightly worse than prior, didn't take Lasix today, on for previous long term prednisone use. Non smoker. Pt has IUD.  Denies CP, SOB, fever.

## 2018-12-25 NOTE — ED ADULT TRIAGE NOTE - CHIEF COMPLAINT QUOTE
c/o abdominal pain, with nausea, vomiting and diarrhea starting today. Reports chills, unable to tolerate PO. reports feeling dehydrated. Reports pain as "labor contractions".

## 2018-12-25 NOTE — ED STATDOCS - PROGRESS NOTE DETAILS
signed Keli Hartmann PA-C Pt seen and evaluated initially in intake by Dr. Vega.   28F c/o intermittent severe epigastric abd pain radiating inferiorly  a few times an hour lasting seconds to minutes starting this morning. Pt initially had diarrhea, then vomited, then pain began. Pt unable to tolerate PO since then. Denies fever. Hx 1 episode of intussusception treated with enema. GI Purow. PMD Anselmi. Plan labs, sono, if negative will need CT. Pt agrees with plan of  care. Pt currently alert and NAD. signed Keli Hartmann PA-C   Elevated lactate. Will order 30cc/kg bolus. No abx at this time. Awaiting sono. Case endorsed to MARTHA Coleman. Will try PO challenge.  Radha Coleman PA-C Tolerating PO.  Lactate Negative.  Will DC with GI follow up.  Enterocolitis.  Reevaluated patient at bedside.  Patient feeling much improved.  Discussed the results of all diagnostic testing in ED and copies of all reports given.   An opportunity to ask questions was given.  Discussed the importance of prompt, close medical follow-up.  Patient will return with any changes, concerns or persistent / worsening symptoms.  Understanding of all instructions verbalized.  Radha Coleman PA-C

## 2018-12-25 NOTE — ED STATDOCS - ATTENDING CONTRIBUTION TO CARE
I, Mathew Vega MD, personally saw the patient with ACP.  I have personally performed a face to face diagnostic evaluation on this patient.  I have reviewed the ACP note and agree with the history, exam, and plan of care, except as noted.

## 2018-12-25 NOTE — ED STATDOCS - NS_ ATTENDINGSCRIBEDETAILS _ED_A_ED_FT
The scribe's documentation has been prepared under my direction and personally reviewed by me in its entirety. I confirm that the note above accurately reflects all work, treatment, procedures, and medical decision-making performed by me.  Mathew Vega MD

## 2018-12-25 NOTE — ED STATDOCS - PHYSICAL EXAMINATION
***GEN - NAD; well appearing; A+O x3 ***HEAD - NC/AT   ***PULMONARY - CTA b/l, symmetric breath sounds. ***CARDIAC -s1s2, RRR, no M,G,R  ***ABDOMEN - ND, soft. +RUQ tenderness. +Balderas's sign.   ***SKIN - no rash or bruising   ***NEUROLOGIC - alert and oriented, follows commands, sensation nl, motor nl, ***PSYCH - insight and judgment nl, memory nl, affect nl, thought nl

## 2018-12-25 NOTE — ED STATDOCS - PMH
Anemia    Asthma  No h/o intubation, dose not use ventolin on daily basis, denies hospitalization due to asthma  Chronic tonsillitis    Emphysema lung    Enlarged lymph nodes  dx: 2014  History of Clostridium difficile infection  2016, not an active infection  Lupus    Migraine    Multigravida    RA (rheumatoid arthritis)    Seronegative arthritis    Undifferentiated connective tissue disease    Vitamin D deficiency

## 2019-01-29 ENCOUNTER — LABORATORY RESULT (OUTPATIENT)
Age: 29
End: 2019-01-29

## 2019-01-29 ENCOUNTER — APPOINTMENT (OUTPATIENT)
Dept: RHEUMATOLOGY | Facility: CLINIC | Age: 29
End: 2019-01-29
Payer: MEDICAID

## 2019-01-29 PROCEDURE — 36415 COLL VENOUS BLD VENIPUNCTURE: CPT

## 2019-01-29 PROCEDURE — 96413 CHEMO IV INFUSION 1 HR: CPT

## 2019-02-15 NOTE — ED ADULT TRIAGE NOTE - ESI TRIAGE ACUITY LEVEL, MLM
Fax received from pharmacy stating PA is required for patient's venlafaxine XR (EFFEXOR XR) 150 MG 24 hr capsule    Forwarded to Dr. Star Soriano   
Faxed, confirmation received.  
Signed to be returned.  
3

## 2019-03-06 PROBLEM — M35.9 SYSTEMIC INVOLVEMENT OF CONNECTIVE TISSUE, UNSPECIFIED: Chronic | Status: ACTIVE | Noted: 2018-12-29

## 2019-03-25 ENCOUNTER — TRANSCRIPTION ENCOUNTER (OUTPATIENT)
Age: 29
End: 2019-03-25

## 2019-03-25 ENCOUNTER — RX RENEWAL (OUTPATIENT)
Age: 29
End: 2019-03-25

## 2019-03-26 ENCOUNTER — APPOINTMENT (OUTPATIENT)
Dept: RHEUMATOLOGY | Facility: CLINIC | Age: 29
End: 2019-03-26

## 2019-03-26 NOTE — ED ADULT NURSE NOTE - CHIEF COMPLAINT QUOTE
c/o abdominal pain, with nausea, vomiting and diarrhea starting today. Reports chills, unable to tolerate PO. reports feeling dehydrated. Reports pain as "labor contractions". Dr Cardona

## 2019-03-29 ENCOUNTER — APPOINTMENT (OUTPATIENT)
Dept: RHEUMATOLOGY | Facility: CLINIC | Age: 29
End: 2019-03-29

## 2019-04-03 ENCOUNTER — LABORATORY RESULT (OUTPATIENT)
Age: 29
End: 2019-04-03

## 2019-04-03 ENCOUNTER — RX RENEWAL (OUTPATIENT)
Age: 29
End: 2019-04-03

## 2019-04-03 ENCOUNTER — APPOINTMENT (OUTPATIENT)
Dept: RHEUMATOLOGY | Facility: CLINIC | Age: 29
End: 2019-04-03
Payer: MEDICAID

## 2019-04-03 PROCEDURE — 36415 COLL VENOUS BLD VENIPUNCTURE: CPT

## 2019-04-03 PROCEDURE — 96413 CHEMO IV INFUSION 1 HR: CPT

## 2019-04-06 NOTE — ED ADULT NURSE NOTE - CAS EDN DISCHARGE INTERVENTIONS
Ken Bruce is a 57 year old male presents today for   Chief Complaint   Patient presents with   • Eye Problem     x 1 day   • Sinus Problem     x 1 week   Ken Bruce is a 57 year old male presenting with nasal congestion x 1 week and eye drainage x 1 day. Patient stated she has had cough, nasal congestion, and right ear pressure for about a week. His right ear now feels plugged and he has pressure. He stated his left eye started with drainage last night and the eye lid is red and irritated. He last took 200 mg of ibuprofen at 0300 this morning.      Denies Latex allergy or sensitivity      Medications: medications verified, no change    ALLERGIES:  No Known Allergies    PCP: Gretel Garcia, NP    Visit Vitals  /81 (BP Location: [facility], Patient Position: Sitting, Cuff Size: Regular)   Pulse 99   Temp 99 °F (37.2 °C) (Oral)   Resp 16   Wt 84.7 kg   SpO2 96%   BMI 28.39 kg/m²       Patient here alone      Patient would like communication of their results via:      Cell Phone:   Telephone Information:   Mobile [phone]     Okay to leave a message containing results? Yes    Health Maintenance Due   Topic Date Due   • Influenza Vaccine (1) 09/01/2018   • Depression Screening  03/28/2019        IV discontinued, cath removed intact

## 2019-04-09 ENCOUNTER — APPOINTMENT (OUTPATIENT)
Dept: RHEUMATOLOGY | Facility: CLINIC | Age: 29
End: 2019-04-09

## 2019-05-03 ENCOUNTER — APPOINTMENT (OUTPATIENT)
Dept: RHEUMATOLOGY | Facility: CLINIC | Age: 29
End: 2019-05-03
Payer: MEDICAID

## 2019-05-03 DIAGNOSIS — R59.0 LOCALIZED ENLARGED LYMPH NODES: ICD-10-CM

## 2019-05-03 PROCEDURE — 99214 OFFICE O/P EST MOD 30 MIN: CPT

## 2019-05-03 RX ORDER — HYDROXYCHLOROQUINE SULFATE 200 MG/1
200 TABLET, FILM COATED ORAL
Qty: 180 | Refills: 3 | Status: DISCONTINUED | COMMUNITY
Start: 2017-05-11 | End: 2019-05-03

## 2019-05-03 RX ORDER — MONTELUKAST 10 MG/1
10 TABLET, FILM COATED ORAL DAILY
Qty: 90 | Refills: 1 | Status: DISCONTINUED | COMMUNITY
Start: 2018-01-17 | End: 2019-05-03

## 2019-05-03 RX ORDER — CHROMIUM 200 MCG
1000 TABLET ORAL
Qty: 60 | Refills: 3 | Status: DISCONTINUED | COMMUNITY
Start: 2017-01-10 | End: 2019-05-03

## 2019-05-03 RX ORDER — EPINEPHRINE 0.3 MG/.3ML
0.3 INJECTION INTRAMUSCULAR
Qty: 1 | Refills: 5 | Status: DISCONTINUED | COMMUNITY
Start: 2017-05-31 | End: 2019-05-03

## 2019-05-03 RX ORDER — BUDESONIDE AND FORMOTEROL FUMARATE DIHYDRATE 160; 4.5 UG/1; UG/1
160-4.5 AEROSOL RESPIRATORY (INHALATION) TWICE DAILY
Qty: 10.2 | Refills: 2 | Status: DISCONTINUED | COMMUNITY
Start: 2017-10-23 | End: 2019-05-03

## 2019-05-27 LAB
25(OH)D3 SERPL-MCNC: 19.9 NG/ML
CRP SERPL-MCNC: 0.81 MG/DL
ERYTHROCYTE [SEDIMENTATION RATE] IN BLOOD BY WESTERGREN METHOD: 23 MM/HR
FOLATE SERPL-MCNC: 10.8 NG/ML
IRON SATN MFR SERPL: 26 %
IRON SERPL-MCNC: 91 UG/DL
TIBC SERPL-MCNC: 347 UG/DL
TSH SERPL-ACNC: 2.94 UIU/ML
UIBC SERPL-MCNC: 256 UG/DL
VIT B12 SERPL-MCNC: 458 PG/ML

## 2019-05-28 ENCOUNTER — APPOINTMENT (OUTPATIENT)
Dept: RHEUMATOLOGY | Facility: CLINIC | Age: 29
End: 2019-05-28

## 2019-05-28 LAB — DSDNA AB SER-ACNC: 12 IU/ML

## 2019-05-29 LAB
ENA RNP AB SER IA-ACNC: <0.2 AL
ENA SM AB SER IA-ACNC: <0.2 AL
ENA SS-A AB SER IA-ACNC: <0.2 AL
ENA SS-B AB SER IA-ACNC: <0.2 AL

## 2019-05-30 LAB — ANA SER IF-ACNC: NEGATIVE

## 2019-06-04 ENCOUNTER — LABORATORY RESULT (OUTPATIENT)
Age: 29
End: 2019-06-04

## 2019-06-04 ENCOUNTER — APPOINTMENT (OUTPATIENT)
Dept: RHEUMATOLOGY | Facility: CLINIC | Age: 29
End: 2019-06-04
Payer: MEDICAID

## 2019-06-04 PROCEDURE — 96413 CHEMO IV INFUSION 1 HR: CPT

## 2019-06-04 PROCEDURE — 36415 COLL VENOUS BLD VENIPUNCTURE: CPT

## 2019-07-05 ENCOUNTER — APPOINTMENT (OUTPATIENT)
Dept: RHEUMATOLOGY | Facility: CLINIC | Age: 29
End: 2019-07-05

## 2019-07-19 ENCOUNTER — RX RENEWAL (OUTPATIENT)
Age: 29
End: 2019-07-19

## 2019-07-22 LAB
APPEARANCE: CLEAR
BILIRUBIN URINE: NEGATIVE
BLOOD URINE: NEGATIVE
COLOR: YELLOW
GLUCOSE QUALITATIVE U: NEGATIVE
KETONES URINE: ABNORMAL
LEUKOCYTE ESTERASE URINE: NEGATIVE
NITRITE URINE: NEGATIVE
PH URINE: 7.5
PROTEIN URINE: NORMAL
SPECIFIC GRAVITY URINE: 1.02
UROBILINOGEN URINE: NORMAL

## 2019-07-23 ENCOUNTER — APPOINTMENT (OUTPATIENT)
Dept: RHEUMATOLOGY | Facility: CLINIC | Age: 29
End: 2019-07-23
Payer: MEDICAID

## 2019-07-23 ENCOUNTER — LABORATORY RESULT (OUTPATIENT)
Age: 29
End: 2019-07-23

## 2019-07-23 VITALS
OXYGEN SATURATION: 98 % | WEIGHT: 208 LBS | BODY MASS INDEX: 32.58 KG/M2 | TEMPERATURE: 97.8 F | DIASTOLIC BLOOD PRESSURE: 72 MMHG | SYSTOLIC BLOOD PRESSURE: 104 MMHG | HEART RATE: 78 BPM

## 2019-07-23 DIAGNOSIS — I31.9 DISEASE OF PERICARDIUM, UNSPECIFIED: ICD-10-CM

## 2019-07-23 LAB
ALBUMIN SERPL ELPH-MCNC: 4.4 G/DL
ALP BLD-CCNC: 78 U/L
ALT SERPL-CCNC: 11 U/L
ANION GAP SERPL CALC-SCNC: 10 MMOL/L
AST SERPL-CCNC: 12 U/L
BILIRUB SERPL-MCNC: 0.4 MG/DL
BUN SERPL-MCNC: 12 MG/DL
CALCIUM SERPL-MCNC: 9.6 MG/DL
CHLORIDE SERPL-SCNC: 103 MMOL/L
CO2 SERPL-SCNC: 25 MMOL/L
CREAT SERPL-MCNC: 0.57 MG/DL
CRP SERPL-MCNC: 0.98 MG/DL
FOLATE SERPL-MCNC: 12.1 NG/ML
GLUCOSE SERPL-MCNC: 95 MG/DL
IRON SATN MFR SERPL: 14 %
IRON SERPL-MCNC: 47 UG/DL
POTASSIUM SERPL-SCNC: 4.5 MMOL/L
PROT SERPL-MCNC: 7.3 G/DL
SODIUM SERPL-SCNC: 138 MMOL/L
TIBC SERPL-MCNC: 337 UG/DL
TSH SERPL-ACNC: 2.64 UIU/ML
UIBC SERPL-MCNC: 290 UG/DL
VIT B12 SERPL-MCNC: 389 PG/ML

## 2019-07-23 PROCEDURE — 99214 OFFICE O/P EST MOD 30 MIN: CPT | Mod: 25

## 2019-07-23 PROCEDURE — 96372 THER/PROPH/DIAG INJ SC/IM: CPT

## 2019-07-23 RX ORDER — METHYLPRED ACET/NACL,ISO-OS/PF 40 MG/ML
40 VIAL (ML) INJECTION
Qty: 1 | Refills: 0 | Status: COMPLETED | OUTPATIENT
Start: 2019-07-23

## 2019-07-23 RX ADMIN — METHYLPREDNISOLONE ACETATE MG/ML: 40 INJECTION, SUSPENSION INTRA-ARTICULAR; INTRALESIONAL; INTRAMUSCULAR; SOFT TISSUE at 00:00

## 2019-07-24 ENCOUNTER — RX RENEWAL (OUTPATIENT)
Age: 29
End: 2019-07-24

## 2019-07-24 LAB
25(OH)D3 SERPL-MCNC: 18.5 NG/ML
BASOPHILS # BLD AUTO: 0.05 K/UL
BASOPHILS NFR BLD AUTO: 0.9 %
DEPRECATED KAPPA LC FREE/LAMBDA SER: 0.65 RATIO
EOSINOPHIL # BLD AUTO: 0.15 K/UL
EOSINOPHIL NFR BLD AUTO: 2.6 %
ERYTHROCYTE [SEDIMENTATION RATE] IN BLOOD BY WESTERGREN METHOD: 10 MM/HR
HCT VFR BLD CALC: 40.7 %
HGB BLD-MCNC: 13.1 G/DL
IGA SER QL IEP: 211 MG/DL
IGG SER QL IEP: 1311 MG/DL
IGM SER QL IEP: 73 MG/DL
IMM GRANULOCYTES NFR BLD AUTO: 0.2 %
KAPPA LC CSF-MCNC: 2.57 MG/DL
KAPPA LC SERPL-MCNC: 1.68 MG/DL
LYMPHOCYTES # BLD AUTO: 2.16 K/UL
LYMPHOCYTES NFR BLD AUTO: 37.4 %
MAN DIFF?: NORMAL
MCHC RBC-ENTMCNC: 29.6 PG
MCHC RBC-ENTMCNC: 32.2 GM/DL
MCV RBC AUTO: 92.1 FL
MONOCYTES # BLD AUTO: 0.47 K/UL
MONOCYTES NFR BLD AUTO: 8.1 %
NEUTROPHILS # BLD AUTO: 2.93 K/UL
NEUTROPHILS NFR BLD AUTO: 50.8 %
PLATELET # BLD AUTO: 413 K/UL
RBC # BLD: 4.42 M/UL
RBC # FLD: 14.6 %
WBC # FLD AUTO: 5.77 K/UL

## 2019-07-30 ENCOUNTER — APPOINTMENT (OUTPATIENT)
Dept: RHEUMATOLOGY | Facility: CLINIC | Age: 29
End: 2019-07-30
Payer: MEDICAID

## 2019-07-30 ENCOUNTER — LABORATORY RESULT (OUTPATIENT)
Age: 29
End: 2019-07-30

## 2019-07-30 ENCOUNTER — RX RENEWAL (OUTPATIENT)
Age: 29
End: 2019-07-30

## 2019-07-30 DIAGNOSIS — M10.9 GOUT, UNSPECIFIED: ICD-10-CM

## 2019-07-30 PROCEDURE — 96413 CHEMO IV INFUSION 1 HR: CPT

## 2019-07-30 PROCEDURE — 36415 COLL VENOUS BLD VENIPUNCTURE: CPT

## 2019-09-17 ENCOUNTER — APPOINTMENT (OUTPATIENT)
Dept: RHEUMATOLOGY | Facility: CLINIC | Age: 29
End: 2019-09-17

## 2019-09-24 ENCOUNTER — LABORATORY RESULT (OUTPATIENT)
Age: 29
End: 2019-09-24

## 2019-09-24 ENCOUNTER — APPOINTMENT (OUTPATIENT)
Dept: RHEUMATOLOGY | Facility: CLINIC | Age: 29
End: 2019-09-24
Payer: MEDICAID

## 2019-09-24 PROCEDURE — 36415 COLL VENOUS BLD VENIPUNCTURE: CPT

## 2019-09-24 PROCEDURE — 96413 CHEMO IV INFUSION 1 HR: CPT

## 2019-09-25 ENCOUNTER — APPOINTMENT (OUTPATIENT)
Dept: INTERNAL MEDICINE | Facility: CLINIC | Age: 29
End: 2019-09-25
Payer: MEDICAID

## 2019-09-25 VITALS
RESPIRATION RATE: 18 BRPM | WEIGHT: 212 LBS | DIASTOLIC BLOOD PRESSURE: 84 MMHG | BODY MASS INDEX: 33.27 KG/M2 | SYSTOLIC BLOOD PRESSURE: 120 MMHG | HEIGHT: 67 IN | HEART RATE: 106 BPM | TEMPERATURE: 99.2 F | OXYGEN SATURATION: 97 %

## 2019-09-25 DIAGNOSIS — R10.2 PELVIC AND PERINEAL PAIN: ICD-10-CM

## 2019-09-25 DIAGNOSIS — Z72.51 HIGH RISK HETEROSEXUAL BEHAVIOR: ICD-10-CM

## 2019-09-25 DIAGNOSIS — Z86.19 PERSONAL HISTORY OF OTHER INFECTIOUS AND PARASITIC DISEASES: ICD-10-CM

## 2019-09-25 DIAGNOSIS — Z86.69 PERSONAL HISTORY OF OTHER DISEASES OF THE NERVOUS SYSTEM AND SENSE ORGANS: ICD-10-CM

## 2019-09-25 DIAGNOSIS — Z87.898 PERSONAL HISTORY OF OTHER SPECIFIED CONDITIONS: ICD-10-CM

## 2019-09-25 DIAGNOSIS — R10.32 LEFT LOWER QUADRANT PAIN: ICD-10-CM

## 2019-09-25 DIAGNOSIS — G43.009 MIGRAINE W/OUT AURA, NOT INTRACTABLE, W/OUT STATUS MIGRAINOSUS: ICD-10-CM

## 2019-09-25 DIAGNOSIS — N95.2 POSTMENOPAUSAL ATROPHIC VAGINITIS: ICD-10-CM

## 2019-09-25 DIAGNOSIS — S83.005D UNSPECIFIED DISLOCATION OF LEFT PATELLA, SUBSEQUENT ENCOUNTER: ICD-10-CM

## 2019-09-25 DIAGNOSIS — M25.552 PAIN IN LEFT HIP: ICD-10-CM

## 2019-09-25 DIAGNOSIS — B37.9 CANDIDIASIS, UNSPECIFIED: ICD-10-CM

## 2019-09-25 DIAGNOSIS — Z87.09 PERSONAL HISTORY OF OTHER DISEASES OF THE RESPIRATORY SYSTEM: ICD-10-CM

## 2019-09-25 DIAGNOSIS — M19.90 UNSPECIFIED OSTEOARTHRITIS, UNSPECIFIED SITE: ICD-10-CM

## 2019-09-25 DIAGNOSIS — Z79.899 OTHER LONG TERM (CURRENT) DRUG THERAPY: ICD-10-CM

## 2019-09-25 DIAGNOSIS — Z09 ENCOUNTER FOR FOLLOW-UP EXAMINATION AFTER COMPLETED TREATMENT FOR CONDITIONS OTHER THAN MALIGNANT NEOPLASM: ICD-10-CM

## 2019-09-25 DIAGNOSIS — M35.9 SYSTEMIC INVOLVEMENT OF CONNECTIVE TISSUE, UNSPECIFIED: ICD-10-CM

## 2019-09-25 DIAGNOSIS — E55.9 VITAMIN D DEFICIENCY, UNSPECIFIED: ICD-10-CM

## 2019-09-25 DIAGNOSIS — R80.9 PROTEINURIA, UNSPECIFIED: ICD-10-CM

## 2019-09-25 DIAGNOSIS — S83.005A UNSPECIFIED DISLOCATION OF LEFT PATELLA, INITIAL ENCOUNTER: ICD-10-CM

## 2019-09-25 DIAGNOSIS — L65.9 NONSCARRING HAIR LOSS, UNSPECIFIED: ICD-10-CM

## 2019-09-25 DIAGNOSIS — R07.81 PLEURODYNIA: ICD-10-CM

## 2019-09-25 DIAGNOSIS — M12.9 ARTHROPATHY, UNSPECIFIED: ICD-10-CM

## 2019-09-25 DIAGNOSIS — L02.91 CUTANEOUS ABSCESS, UNSPECIFIED: ICD-10-CM

## 2019-09-25 DIAGNOSIS — G43.909 MIGRAINE, UNSPECIFIED, NOT INTRACTABLE, W/OUT STATUS MIGRAINOSUS: ICD-10-CM

## 2019-09-25 DIAGNOSIS — Z02.0 ENCOUNTER FOR EXAMINATION FOR ADMISSION TO EDUCATIONAL INSTITUTION: ICD-10-CM

## 2019-09-25 DIAGNOSIS — Z30.430 ENCOUNTER FOR INSERTION OF INTRAUTERINE CONTRACEPTIVE DEVICE: ICD-10-CM

## 2019-09-25 DIAGNOSIS — N39.0 URINARY TRACT INFECTION, SITE NOT SPECIFIED: ICD-10-CM

## 2019-09-25 DIAGNOSIS — Z91.030 BEE ALLERGY STATUS: ICD-10-CM

## 2019-09-25 DIAGNOSIS — M25.562 PAIN IN LEFT KNEE: ICD-10-CM

## 2019-09-25 DIAGNOSIS — N92.1 EXCESSIVE AND FREQUENT MENSTRUATION WITH IRREGULAR CYCLE: ICD-10-CM

## 2019-09-25 DIAGNOSIS — Z30.40 ENCOUNTER FOR SURVEILLANCE OF CONTRACEPTIVES, UNSPECIFIED: ICD-10-CM

## 2019-09-25 DIAGNOSIS — Z01.419 ENCOUNTER FOR GYNECOLOGICAL EXAMINATION (GENERAL) (ROUTINE) W/OUT ABNORMAL FINDINGS: ICD-10-CM

## 2019-09-25 DIAGNOSIS — Z87.440 PERSONAL HISTORY OF URINARY (TRACT) INFECTIONS: ICD-10-CM

## 2019-09-25 PROCEDURE — 99395 PREV VISIT EST AGE 18-39: CPT

## 2019-09-25 PROCEDURE — 99385 PREV VISIT NEW AGE 18-39: CPT

## 2019-09-25 RX ORDER — TIZANIDINE 2 MG/1
2 TABLET ORAL
Qty: 20 | Refills: 0 | Status: DISCONTINUED | COMMUNITY
Start: 2019-07-19 | End: 2019-09-25

## 2019-09-25 NOTE — HISTORY OF PRESENT ILLNESS
[FreeTextEntry1] : Initial annual physical examination [de-identified] : Ms. Vidal is a 29-year-old female presents for initial annual physical examination. She has a history of undifferentiated connective tissue disease and inflammatory arthritis. She is followed by rheumatology. She is currently on Plaquenil 200 mg daily as well as Simponi infusions every 8 weeks. She also has a history of hypothyroidism secondary to Hashimoto's disease and gout. Missed her dose suffers from migraine headaches. She denies any chest pain or palpitations but has had an episode of pericarditis in the past. She does get some shortness of breath with exertion however symptoms resolve with rest. She is followed by rheumatology.

## 2019-09-25 NOTE — PHYSICAL EXAM
[No Acute Distress] : no acute distress [Well Nourished] : well nourished [Well Developed] : well developed [Well-Appearing] : well-appearing [Normal Sclera/Conjunctiva] : normal sclera/conjunctiva [PERRL] : pupils equal round and reactive to light [EOMI] : extraocular movements intact [Normal Outer Ear/Nose] : the outer ears and nose were normal in appearance [Normal Oropharynx] : the oropharynx was normal [No JVD] : no jugular venous distention [No Lymphadenopathy] : no lymphadenopathy [Supple] : supple [Thyroid Normal, No Nodules] : the thyroid was normal and there were no nodules present [No Respiratory Distress] : no respiratory distress  [No Accessory Muscle Use] : no accessory muscle use [Clear to Auscultation] : lungs were clear to auscultation bilaterally [Normal Rate] : normal rate  [Regular Rhythm] : with a regular rhythm [Normal S1, S2] : normal S1 and S2 [No Murmur] : no murmur heard [No Carotid Bruits] : no carotid bruits [No Abdominal Bruit] : a ~M bruit was not heard ~T in the abdomen [No Varicosities] : no varicosities [Pedal Pulses Present] : the pedal pulses are present [No Edema] : there was no peripheral edema [No Palpable Aorta] : no palpable aorta [No Extremity Clubbing/Cyanosis] : no extremity clubbing/cyanosis [Soft] : abdomen soft [Non Tender] : non-tender [Non-distended] : non-distended [No Masses] : no abdominal mass palpated [No HSM] : no HSM [Normal Bowel Sounds] : normal bowel sounds [Normal Posterior Cervical Nodes] : no posterior cervical lymphadenopathy [Normal Anterior Cervical Nodes] : no anterior cervical lymphadenopathy [No CVA Tenderness] : no CVA  tenderness [No Joint Swelling] : no joint swelling [No Spinal Tenderness] : no spinal tenderness [Grossly Normal Strength/Tone] : grossly normal strength/tone [No Rash] : no rash [Coordination Grossly Intact] : coordination grossly intact [No Focal Deficits] : no focal deficits [Deep Tendon Reflexes (DTR)] : deep tendon reflexes were 2+ and symmetric [Normal Gait] : normal gait [Normal Insight/Judgement] : insight and judgment were intact [Normal Affect] : the affect was normal

## 2019-09-25 NOTE — REVIEW OF SYSTEMS
[Fatigue] : fatigue [Shortness Of Breath] : shortness of breath [Muscle Pain] : muscle pain [Joint Pain] : joint pain [Negative] : Heme/Lymph

## 2019-09-25 NOTE — PLAN
[FreeTextEntry1] : Ms. Vidal is a 29-year-old female presents for initial annual physical examination. She has a history of undifferentiated connective tissue disease and inflammatory arthritis. She will continue her current medication regimen which has been revised and reviewed. Patient has had recent blood work which has been reviewed as well. She will be given a prescription for a fasting lipid profile. She will continue to followup with rheumatology in gynecology as scheduled. Patient will schedule an appointment for the flu vaccine. Follow up in this office in 6 months

## 2019-09-25 NOTE — HEALTH RISK ASSESSMENT
[Good] : ~his/her~  mood as  good [No falls in past year] : Patient reported no falls in the past year [No] : In the past 12 months have you used drugs other than those required for medical reasons? No [1] : 2) Feeling down, depressed, or hopeless for several days (1) [Employed] : employed [With Family] : lives with family [Fully functional (using the telephone, shopping, preparing meals, housekeeping, doing laundry, using] : Fully functional and needs no help or supervision to perform IADLs (using the telephone, shopping, preparing meals, housekeeping, doing laundry, using transportation, managing medications and managing finances) [Fully functional (bathing, dressing, toileting, transferring, walking, feeding)] : Fully functional (bathing, dressing, toileting, transferring, walking, feeding) [Aggressive treatment] : aggressive treatment [] : No [[id number]] : 2 [Audit-CScore] : zero [Change in mental status noted] : No change in mental status noted [Language] : denies difficulty with language [Behavior] : denies difficulty with behavior [Learning/Retaining New Information] : denies difficulty learning/retaining new information [Handling Complex Tasks] : denies difficulty handling complex tasks [Spatial Ability and Orientation] : denies difficulty with spatial ability and orientation [Reasoning] : denies difficulty with reasoning [AdvancecareDate] : 9/19

## 2019-09-30 LAB
CHOLEST SERPL-MCNC: 104 MG/DL
CHOLEST/HDLC SERPL: 2.5 RATIO
HDLC SERPL-MCNC: 42 MG/DL
LDLC SERPL CALC-MCNC: 53 MG/DL
TRIGL SERPL-MCNC: 46 MG/DL

## 2019-10-02 ENCOUNTER — APPOINTMENT (OUTPATIENT)
Dept: OBGYN | Facility: CLINIC | Age: 29
End: 2019-10-02

## 2019-10-03 NOTE — ED ADULT NURSE NOTE - DISCHARGE DATE/TIME
19-Aug-2017 18:35 Modified Advancement Flap Text: The defect edges were debeveled with a #15 scalpel blade.  Given the location of the defect, shape of the defect and the proximity to free margins a modified advancement flap was deemed most appropriate.  Using a sterile surgical marker, an appropriate advancement flap was drawn incorporating the defect and placing the expected incisions within the relaxed skin tension lines where possible.    The area thus outlined was incised deep to adipose tissue with a #15 scalpel blade.  The skin margins were undermined to an appropriate distance in all directions utilizing iris scissors.

## 2019-10-07 ENCOUNTER — APPOINTMENT (OUTPATIENT)
Dept: OBGYN | Facility: CLINIC | Age: 29
End: 2019-10-07

## 2019-10-29 ENCOUNTER — APPOINTMENT (OUTPATIENT)
Dept: INTERNAL MEDICINE | Facility: CLINIC | Age: 29
End: 2019-10-29
Payer: MEDICAID

## 2019-10-29 PROCEDURE — G0008: CPT

## 2019-10-29 PROCEDURE — 90686 IIV4 VACC NO PRSV 0.5 ML IM: CPT

## 2019-11-19 ENCOUNTER — LABORATORY RESULT (OUTPATIENT)
Age: 29
End: 2019-11-19

## 2019-11-19 ENCOUNTER — APPOINTMENT (OUTPATIENT)
Dept: RHEUMATOLOGY | Facility: CLINIC | Age: 29
End: 2019-11-19
Payer: MEDICAID

## 2019-11-19 ENCOUNTER — APPOINTMENT (OUTPATIENT)
Dept: RHEUMATOLOGY | Facility: CLINIC | Age: 29
End: 2019-11-19

## 2019-11-19 PROCEDURE — 96413 CHEMO IV INFUSION 1 HR: CPT

## 2019-11-19 PROCEDURE — 36415 COLL VENOUS BLD VENIPUNCTURE: CPT

## 2019-11-20 ENCOUNTER — RX RENEWAL (OUTPATIENT)
Age: 29
End: 2019-11-20

## 2020-01-10 ENCOUNTER — APPOINTMENT (OUTPATIENT)
Dept: PAIN MANAGEMENT | Facility: CLINIC | Age: 30
End: 2020-01-10

## 2020-01-13 ENCOUNTER — APPOINTMENT (OUTPATIENT)
Dept: INTERNAL MEDICINE | Facility: CLINIC | Age: 30
End: 2020-01-13
Payer: COMMERCIAL

## 2020-01-13 ENCOUNTER — RESULT CHARGE (OUTPATIENT)
Age: 30
End: 2020-01-13

## 2020-01-13 ENCOUNTER — NON-APPOINTMENT (OUTPATIENT)
Age: 30
End: 2020-01-13

## 2020-01-13 DIAGNOSIS — Z20.828 CONTACT WITH AND (SUSPECTED) EXPOSURE TO OTHER VIRAL COMMUNICABLE DISEASES: ICD-10-CM

## 2020-01-13 DIAGNOSIS — Z00.00 ENCOUNTER FOR GENERAL ADULT MEDICAL EXAMINATION W/OUT ABNORMAL FINDINGS: ICD-10-CM

## 2020-01-13 LAB
FLUAV SPEC QL CULT: NEGATIVE
FLUBV AG SPEC QL IA: NEGATIVE

## 2020-01-13 PROCEDURE — 99214 OFFICE O/P EST MOD 30 MIN: CPT | Mod: 25

## 2020-01-13 PROCEDURE — 94060 EVALUATION OF WHEEZING: CPT

## 2020-01-13 RX ORDER — BUDESONIDE 0.5 MG/2ML
0.5 INHALANT ORAL
Qty: 2 | Refills: 2 | Status: ACTIVE | COMMUNITY
Start: 2020-01-13 | End: 1900-01-01

## 2020-01-13 RX ORDER — HYDROCHLOROTHIAZIDE 12.5 MG/1
12.5 CAPSULE ORAL DAILY
Qty: 90 | Refills: 3 | Status: ACTIVE | COMMUNITY
Start: 2017-05-13 | End: 1900-01-01

## 2020-01-13 RX ORDER — ERGOCALCIFEROL 1.25 MG/1
1.25 MG CAPSULE, LIQUID FILLED ORAL
Qty: 12 | Refills: 3 | Status: COMPLETED | COMMUNITY
Start: 2017-10-12 | End: 2020-01-13

## 2020-01-13 RX ORDER — ALBUTEROL SULFATE 90 UG/1
108 (90 BASE) AEROSOL, METERED RESPIRATORY (INHALATION)
Qty: 1 | Refills: 3 | Status: ACTIVE | COMMUNITY
Start: 2017-10-19 | End: 1900-01-01

## 2020-01-13 RX ORDER — IBUPROFEN 800 MG/1
800 TABLET, FILM COATED ORAL 3 TIMES DAILY
Qty: 90 | Refills: 2 | Status: COMPLETED | COMMUNITY
Start: 2019-04-03 | End: 2020-01-13

## 2020-01-13 NOTE — PHYSICAL EXAM
[No Acute Distress] : no acute distress [Well Developed] : well developed [Well Nourished] : well nourished [Normal Outer Ear/Nose] : the outer ears and nose were normal in appearance [Normal Oropharynx] : the oropharynx was normal [Normal TMs] : both tympanic membranes were normal [No Lymphadenopathy] : no lymphadenopathy [Supple] : supple [Clear to Auscultation] : lungs were clear to auscultation bilaterally [Normal Rate] : normal rate  [Regular Rhythm] : with a regular rhythm [Normal S1, S2] : normal S1 and S2 [Normal Posterior Cervical Nodes] : no posterior cervical lymphadenopathy [Coordination Grossly Intact] : coordination grossly intact [Normal Anterior Cervical Nodes] : no anterior cervical lymphadenopathy [No Focal Deficits] : no focal deficits [Normal Gait] : normal gait [Normal Affect] : the affect was normal [Alert and Oriented x3] : oriented to person, place, and time [de-identified] : expiratory wheeze on forced maneuvers  [Normal Insight/Judgement] : insight and judgment were intact

## 2020-01-13 NOTE — HISTORY OF PRESENT ILLNESS
[FreeTextEntry8] : Pt presents  to the office today with complaints of cough, productive for green sputum x 2 days. This is associated with  mild wheezing. She woke up this am with  joint aches. She is requesting a flu swab. Her family  member is in ICU with + flu and pneumonia and intubated.\par She has a history of asthma , and inflammatory arthritis . She does not use any daily maintenance inhalers. She has Proair and a nebulizer as needed . She sees rheumatology regularly for Simponi infusions .  \par She denies fever, chills, chest pain , shortness of breath, lightheadedness.  \par

## 2020-01-13 NOTE — DATA REVIEWED
[FreeTextEntry1] : Pre/post spirometry reveals mild obstruction . Post bronchodilator not improved.

## 2020-01-13 NOTE — REVIEW OF SYSTEMS
[Fever] : no fever [Chills] : no chills complains of pain/discomfort [Fatigue] : fatigue [Shortness Of Breath] : no shortness of breath [Wheezing] : wheezing [Cough] : cough [Dyspnea on Exertion] : no dyspnea on exertion [Negative] : Psychiatric [FreeTextEntry6] : see HPI

## 2020-01-13 NOTE — ASSESSMENT
[FreeTextEntry1] : rapid flu negative , asthmatic bronchitis \par pt refused steroids \par Doxycycline 100 mg po BID x 7days \par Proair QID PRN,  wheeze \par Albuterol , Budesonide for nebulizer renewed \par Call if not improving \par To ER with emergent  symptoms \par

## 2020-01-14 ENCOUNTER — APPOINTMENT (OUTPATIENT)
Dept: RHEUMATOLOGY | Facility: CLINIC | Age: 30
End: 2020-01-14

## 2020-01-28 ENCOUNTER — APPOINTMENT (OUTPATIENT)
Dept: RHEUMATOLOGY | Facility: CLINIC | Age: 30
End: 2020-01-28
Payer: COMMERCIAL

## 2020-01-28 ENCOUNTER — LABORATORY RESULT (OUTPATIENT)
Age: 30
End: 2020-01-28

## 2020-01-28 PROCEDURE — 36415 COLL VENOUS BLD VENIPUNCTURE: CPT

## 2020-01-28 PROCEDURE — 96413 CHEMO IV INFUSION 1 HR: CPT

## 2020-02-02 ENCOUNTER — TRANSCRIPTION ENCOUNTER (OUTPATIENT)
Age: 30
End: 2020-02-02

## 2020-02-05 ENCOUNTER — FORM ENCOUNTER (OUTPATIENT)
Age: 30
End: 2020-02-05

## 2020-02-06 ENCOUNTER — APPOINTMENT (OUTPATIENT)
Dept: RHEUMATOLOGY | Facility: CLINIC | Age: 30
End: 2020-02-06
Payer: COMMERCIAL

## 2020-02-06 VITALS
TEMPERATURE: 98 F | DIASTOLIC BLOOD PRESSURE: 81 MMHG | SYSTOLIC BLOOD PRESSURE: 132 MMHG | BODY MASS INDEX: 33.27 KG/M2 | WEIGHT: 212 LBS | HEART RATE: 74 BPM | HEIGHT: 67 IN

## 2020-02-06 DIAGNOSIS — J18.9 PNEUMONIA, UNSPECIFIED ORGANISM: ICD-10-CM

## 2020-02-06 PROCEDURE — 99213 OFFICE O/P EST LOW 20 MIN: CPT

## 2020-02-06 PROCEDURE — 71046 X-RAY EXAM CHEST 2 VIEWS: CPT

## 2020-02-06 RX ORDER — PROMETHAZINE HYDROCHLORIDE AND DEXTROMETHORPHAN HYDROBROMIDE ORAL SOLUTION 15; 6.25 MG/5ML; MG/5ML
6.25-15 SOLUTION ORAL
Qty: 40 | Refills: 0 | Status: DISCONTINUED | COMMUNITY
Start: 2020-02-02

## 2020-02-06 RX ORDER — OSELTAMIVIR PHOSPHATE 75 MG/1
75 CAPSULE ORAL
Qty: 10 | Refills: 0 | Status: DISCONTINUED | COMMUNITY
Start: 2020-02-02
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
## 2020-02-06 NOTE — REVIEW OF SYSTEMS
[Joint Pain] : joint pain [As Noted in HPI] : as noted in HPI [Skin Lesions] : skin lesion [Negative] : Heme/Lymph

## 2020-02-06 NOTE — HISTORY OF PRESENT ILLNESS
[___ Month(s) Ago] : [unfilled] month(s) ago [FreeTextEntry1] : flu type B - coughing with productive - yellow/green - on promethazine at night - but unable to stop coughing during the day

## 2020-02-06 NOTE — ASSESSMENT
[FreeTextEntry1] : 28 year-old female with \par \par flu type B - productive/coughing - no fevers since Tuesday - \par chest x-rays today - start tessalon pearls\par had doxy 3 weeks ago for bronchitis\par will start levaquin - had infusion 1 week ago\par \par No coronavirus contact\par \par

## 2020-02-10 ENCOUNTER — EMERGENCY (EMERGENCY)
Facility: HOSPITAL | Age: 30
LOS: 1 days | Discharge: HOME | End: 2020-02-10
Attending: EMERGENCY MEDICINE | Admitting: EMERGENCY MEDICINE
Payer: COMMERCIAL

## 2020-02-10 VITALS
OXYGEN SATURATION: 98 % | SYSTOLIC BLOOD PRESSURE: 116 MMHG | DIASTOLIC BLOOD PRESSURE: 75 MMHG | HEART RATE: 89 BPM | TEMPERATURE: 98 F | RESPIRATION RATE: 18 BRPM

## 2020-02-10 DIAGNOSIS — Z33.2 ENCOUNTER FOR ELECTIVE TERMINATION OF PREGNANCY: Chronic | ICD-10-CM

## 2020-02-10 DIAGNOSIS — R42 DIZZINESS AND GIDDINESS: ICD-10-CM

## 2020-02-10 DIAGNOSIS — Z98.89 OTHER SPECIFIED POSTPROCEDURAL STATES: Chronic | ICD-10-CM

## 2020-02-10 LAB
BASOPHILS # BLD AUTO: 0.03 K/UL — SIGNIFICANT CHANGE UP (ref 0–0.2)
BASOPHILS NFR BLD AUTO: 0.5 % — SIGNIFICANT CHANGE UP (ref 0–2)
EOSINOPHIL # BLD AUTO: 0.16 K/UL — SIGNIFICANT CHANGE UP (ref 0–0.5)
EOSINOPHIL NFR BLD AUTO: 2.4 % — SIGNIFICANT CHANGE UP (ref 0–6)
HCT VFR BLD CALC: 40.5 % — SIGNIFICANT CHANGE UP (ref 34.5–45)
HGB BLD-MCNC: 13.4 G/DL — SIGNIFICANT CHANGE UP (ref 11.5–15.5)
IMM GRANULOCYTES NFR BLD AUTO: 0.2 % — SIGNIFICANT CHANGE UP (ref 0–1.5)
LYMPHOCYTES # BLD AUTO: 4.04 K/UL — HIGH (ref 1–3.3)
LYMPHOCYTES # BLD AUTO: 61 % — HIGH (ref 13–44)
MCHC RBC-ENTMCNC: 28.9 PG — SIGNIFICANT CHANGE UP (ref 27–34)
MCHC RBC-ENTMCNC: 33.1 GM/DL — SIGNIFICANT CHANGE UP (ref 32–36)
MCV RBC AUTO: 87.5 FL — SIGNIFICANT CHANGE UP (ref 80–100)
MONOCYTES # BLD AUTO: 0.48 K/UL — SIGNIFICANT CHANGE UP (ref 0–0.9)
MONOCYTES NFR BLD AUTO: 7.3 % — SIGNIFICANT CHANGE UP (ref 2–14)
NEUTROPHILS # BLD AUTO: 1.9 K/UL — SIGNIFICANT CHANGE UP (ref 1.8–7.4)
NEUTROPHILS NFR BLD AUTO: 28.6 % — LOW (ref 43–77)
NRBC # BLD: 0 /100 WBCS — SIGNIFICANT CHANGE UP (ref 0–0)
PLATELET # BLD AUTO: 381 K/UL — SIGNIFICANT CHANGE UP (ref 150–400)
RBC # BLD: 4.63 M/UL — SIGNIFICANT CHANGE UP (ref 3.8–5.2)
RBC # FLD: 14.6 % — HIGH (ref 10.3–14.5)
WBC # BLD: 6.62 K/UL — SIGNIFICANT CHANGE UP (ref 3.8–10.5)
WBC # FLD AUTO: 6.62 K/UL — SIGNIFICANT CHANGE UP (ref 3.8–10.5)

## 2020-02-10 PROCEDURE — 73030 X-RAY EXAM OF SHOULDER: CPT | Mod: 26,LT

## 2020-02-10 PROCEDURE — 99285 EMERGENCY DEPT VISIT HI MDM: CPT

## 2020-02-10 RX ORDER — ACETAMINOPHEN 500 MG
975 TABLET ORAL ONCE
Refills: 0 | Status: COMPLETED | OUTPATIENT
Start: 2020-02-10 | End: 2020-02-10

## 2020-02-10 RX ORDER — LIDOCAINE 4 G/100G
1 CREAM TOPICAL ONCE
Refills: 0 | Status: COMPLETED | OUTPATIENT
Start: 2020-02-10 | End: 2020-02-10

## 2020-02-10 RX ORDER — ONDANSETRON 8 MG/1
4 TABLET, FILM COATED ORAL ONCE
Refills: 0 | Status: COMPLETED | OUTPATIENT
Start: 2020-02-10 | End: 2020-02-10

## 2020-02-10 RX ORDER — SODIUM CHLORIDE 9 MG/ML
1000 INJECTION INTRAMUSCULAR; INTRAVENOUS; SUBCUTANEOUS ONCE
Refills: 0 | Status: COMPLETED | OUTPATIENT
Start: 2020-02-10 | End: 2020-02-10

## 2020-02-10 RX ADMIN — Medication 975 MILLIGRAM(S): at 23:34

## 2020-02-10 RX ADMIN — LIDOCAINE 1 PATCH: 4 CREAM TOPICAL at 23:35
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
## 2020-02-11 VITALS
SYSTOLIC BLOOD PRESSURE: 100 MMHG | DIASTOLIC BLOOD PRESSURE: 60 MMHG | HEART RATE: 68 BPM | OXYGEN SATURATION: 99 % | TEMPERATURE: 98 F | RESPIRATION RATE: 18 BRPM

## 2020-02-11 LAB
ALBUMIN SERPL ELPH-MCNC: 4.3 G/DL — SIGNIFICANT CHANGE UP (ref 3.3–5)
ALP SERPL-CCNC: 77 U/L — SIGNIFICANT CHANGE UP (ref 40–120)
ALT FLD-CCNC: 13 U/L — SIGNIFICANT CHANGE UP (ref 10–45)
ANION GAP SERPL CALC-SCNC: 11 MMOL/L — SIGNIFICANT CHANGE UP (ref 5–17)
APTT BLD: 30.7 SEC — SIGNIFICANT CHANGE UP (ref 27.5–36.3)
AST SERPL-CCNC: 9 U/L — LOW (ref 10–40)
BILIRUB SERPL-MCNC: 0.2 MG/DL — SIGNIFICANT CHANGE UP (ref 0.2–1.2)
BUN SERPL-MCNC: 16 MG/DL — SIGNIFICANT CHANGE UP (ref 7–23)
CALCIUM SERPL-MCNC: 9.6 MG/DL — SIGNIFICANT CHANGE UP (ref 8.4–10.5)
CHLORIDE SERPL-SCNC: 101 MMOL/L — SIGNIFICANT CHANGE UP (ref 96–108)
CO2 SERPL-SCNC: 25 MMOL/L — SIGNIFICANT CHANGE UP (ref 22–31)
CREAT SERPL-MCNC: 0.58 MG/DL — SIGNIFICANT CHANGE UP (ref 0.5–1.3)
GLUCOSE SERPL-MCNC: 104 MG/DL — HIGH (ref 70–99)
HCG SERPL-ACNC: <2 MIU/ML — SIGNIFICANT CHANGE UP
INR BLD: 1.02 RATIO — SIGNIFICANT CHANGE UP (ref 0.88–1.16)
POTASSIUM SERPL-MCNC: 4.1 MMOL/L — SIGNIFICANT CHANGE UP (ref 3.5–5.3)
POTASSIUM SERPL-SCNC: 4.1 MMOL/L — SIGNIFICANT CHANGE UP (ref 3.5–5.3)
PROT SERPL-MCNC: 7.6 G/DL — SIGNIFICANT CHANGE UP (ref 6–8.3)
PROTHROM AB SERPL-ACNC: 11.7 SEC — SIGNIFICANT CHANGE UP (ref 10–12.9)
SODIUM SERPL-SCNC: 137 MMOL/L — SIGNIFICANT CHANGE UP (ref 135–145)

## 2020-02-11 PROCEDURE — 85610 PROTHROMBIN TIME: CPT

## 2020-02-11 PROCEDURE — 72125 CT NECK SPINE W/O DYE: CPT | Mod: 26

## 2020-02-11 PROCEDURE — 96374 THER/PROPH/DIAG INJ IV PUSH: CPT | Mod: XU

## 2020-02-11 PROCEDURE — 71260 CT THORAX DX C+: CPT | Mod: 26

## 2020-02-11 PROCEDURE — 74177 CT ABD & PELVIS W/CONTRAST: CPT | Mod: 26

## 2020-02-11 PROCEDURE — 70450 CT HEAD/BRAIN W/O DYE: CPT

## 2020-02-11 PROCEDURE — 99284 EMERGENCY DEPT VISIT MOD MDM: CPT | Mod: 25

## 2020-02-11 PROCEDURE — 72129 CT CHEST SPINE W/DYE: CPT | Mod: 26

## 2020-02-11 PROCEDURE — 84702 CHORIONIC GONADOTROPIN TEST: CPT

## 2020-02-11 PROCEDURE — 80053 COMPREHEN METABOLIC PANEL: CPT

## 2020-02-11 PROCEDURE — 70450 CT HEAD/BRAIN W/O DYE: CPT | Mod: 26

## 2020-02-11 PROCEDURE — 73030 X-RAY EXAM OF SHOULDER: CPT

## 2020-02-11 PROCEDURE — 85027 COMPLETE CBC AUTOMATED: CPT

## 2020-02-11 PROCEDURE — 71260 CT THORAX DX C+: CPT

## 2020-02-11 PROCEDURE — 72132 CT LUMBAR SPINE W/DYE: CPT | Mod: 26

## 2020-02-11 PROCEDURE — 74177 CT ABD & PELVIS W/CONTRAST: CPT

## 2020-02-11 PROCEDURE — 72125 CT NECK SPINE W/O DYE: CPT

## 2020-02-11 PROCEDURE — 85730 THROMBOPLASTIN TIME PARTIAL: CPT

## 2020-02-11 RX ORDER — MECLIZINE HCL 12.5 MG
12.5 TABLET ORAL ONCE
Refills: 0 | Status: COMPLETED | OUTPATIENT
Start: 2020-02-11 | End: 2020-02-11

## 2020-02-11 RX ADMIN — Medication 12.5 MILLIGRAM(S): at 01:14

## 2020-02-11 RX ADMIN — SODIUM CHLORIDE 1000 MILLILITER(S): 9 INJECTION INTRAMUSCULAR; INTRAVENOUS; SUBCUTANEOUS at 01:30

## 2020-02-11 RX ADMIN — Medication 975 MILLIGRAM(S): at 01:50

## 2020-02-11 RX ADMIN — ONDANSETRON 4 MILLIGRAM(S): 8 TABLET, FILM COATED ORAL at 01:15

## 2020-02-11 RX ADMIN — SODIUM CHLORIDE 1000 MILLILITER(S): 9 INJECTION INTRAMUSCULAR; INTRAVENOUS; SUBCUTANEOUS at 01:13
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
## 2020-02-11 NOTE — ED PROVIDER NOTE - NS ED ROS FT
Constitutional: No fever or chills  Eyes: No visual changes,   HEENT: No throat pain, ear pain, nasal pain. No nose bleeding.  CV: No chest pain or lower extremity edema  Resp: No cough  GI: No abd pain. + nausea + vomiting.   : No dysuria, hematuria.   MSK: +back pain +neck pain +L shoulder pain   Psych: No complaints   Neuro: +"foggy feeling" +headache. + numbness + tingling. + weakness.
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
## 2020-02-11 NOTE — ED PROVIDER NOTE - NSFOLLOWUPINSTRUCTIONS_ED_ALL_ED_FT
IMPORTANT INSTRUCTIONS FROM Dr. RAIN:    Please follow up with your personal medical doctor in 24-48 hours. See Dr Ruiz at the appt you have. Tylenol for headache. Bring CT results to your personal medical doctor as there were abnormal findings as we discussed that will require follow up.    Bring results from today to your visit.    If you were advised to take any medications - be sure to review the package insert.    If your symptoms change, get worse or if you have any new symptoms, come to the ER right away.  If you have any questions, call the ER at the phone number on this page.
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
## 2020-02-11 NOTE — ED PROVIDER NOTE - PATIENT PORTAL LINK FT
You can access the FollowMyHealth Patient Portal offered by [facility] by registering at the following website: [email or web address]. By joining [facility]’s FollowMyHealth portal, you will also be able to view your health information using other applications (apps) compatible with our system.
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
## 2020-02-12 ENCOUNTER — APPOINTMENT (OUTPATIENT)
Dept: NEUROSURGERY | Facility: CLINIC | Age: 30
End: 2020-02-12
Payer: COMMERCIAL

## 2020-02-12 VITALS
SYSTOLIC BLOOD PRESSURE: 118 MMHG | TEMPERATURE: 98.3 F | DIASTOLIC BLOOD PRESSURE: 72 MMHG | HEART RATE: 74 BPM | RESPIRATION RATE: 16 BRPM | OXYGEN SATURATION: 96 % | HEIGHT: 67 IN | BODY MASS INDEX: 33.74 KG/M2 | WEIGHT: 215 LBS

## 2020-02-12 DIAGNOSIS — S06.0X0A CONCUSSION W/OUT LOSS OF CONSCIOUSNESS, INITIAL ENCOUNTER: ICD-10-CM

## 2020-02-12 PROCEDURE — 99203 OFFICE O/P NEW LOW 30 MIN: CPT

## 2020-02-14 ENCOUNTER — FORM ENCOUNTER (OUTPATIENT)
Age: 30
End: 2020-02-14

## 2020-02-15 ENCOUNTER — APPOINTMENT (OUTPATIENT)
Dept: MRI IMAGING | Facility: CLINIC | Age: 30
End: 2020-02-15
Payer: COMMERCIAL

## 2020-02-15 ENCOUNTER — OUTPATIENT (OUTPATIENT)
Dept: OUTPATIENT SERVICES | Facility: HOSPITAL | Age: 30
LOS: 1 days | End: 2020-02-15
Payer: COMMERCIAL

## 2020-02-15 ENCOUNTER — APPOINTMENT (OUTPATIENT)
Dept: ULTRASOUND IMAGING | Facility: CLINIC | Age: 30
End: 2020-02-15
Payer: COMMERCIAL

## 2020-02-15 DIAGNOSIS — Z33.2 ENCOUNTER FOR ELECTIVE TERMINATION OF PREGNANCY: Chronic | ICD-10-CM

## 2020-02-15 DIAGNOSIS — M54.2 CERVICALGIA: ICD-10-CM

## 2020-02-15 DIAGNOSIS — Z98.89 OTHER SPECIFIED POSTPROCEDURAL STATES: Chronic | ICD-10-CM

## 2020-02-15 DIAGNOSIS — E03.8 OTHER SPECIFIED HYPOTHYROIDISM: ICD-10-CM

## 2020-02-15 PROCEDURE — 72141 MRI NECK SPINE W/O DYE: CPT | Mod: 26

## 2020-02-15 PROCEDURE — 76536 US EXAM OF HEAD AND NECK: CPT

## 2020-02-15 PROCEDURE — 76536 US EXAM OF HEAD AND NECK: CPT | Mod: 26

## 2020-02-15 PROCEDURE — 72141 MRI NECK SPINE W/O DYE: CPT
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
## 2020-02-17 NOTE — RESULTS/DATA
[FreeTextEntry1] : 2/11/2020 \par LS and T-spine reformat from abd CT: No acute thoracolumbar spine fracture, subluxation or evidence of traumatic \par malalignment. Mild right-sided thoracic curvature. Provided no \par contraindications, MRI can be performed if there is concern for subtle \par osseous, ligamentous or cord injury. \par \par Head and c-spine CT\par CT BRAIN: No acute intracranial hemorrhage, mass effect or acute displaced \par calvarium fracture. If there are new or persistent symptoms, consider \par further evaluation with MRI provided no contraindications. \par \par CERVICAL SPINE CT: No acute cervical spine fracture or evidence of traumatic \par malalignment. Mild asymmetry of atlantodental interval, likely related to \par patient's position. Provided no contraindications, MRI can be performed if \par there is concern for subtle osseous, ligamentous or cord injury. \par Heterogenous thyroid lobes containing suspected nodules for which \par nonemergent thyroid ultrasound correlation is advised. \par \par

## 2020-02-17 NOTE — PHYSICAL EXAM
[General Appearance - Alert] : alert [General Appearance - Well Nourished] : well nourished [General Appearance - In No Acute Distress] : in no acute distress [Oriented To Time, Place, And Person] : oriented to person, place, and time [General Appearance - Well-Appearing] : healthy appearing [Affect] : the affect was normal [Impaired Insight] : insight and judgment were intact [Person] : oriented to person [Short Term Intact] : short term memory intact [Time] : oriented to time [Place] : oriented to place [Cranial Nerves Optic (II)] : visual acuity intact bilaterally,  pupils equal round and reactive to light [Cranial Nerves Oculomotor (III)] : extraocular motion intact [Cranial Nerves Facial (VII)] : face symmetrical [Cranial Nerves Trigeminal (V)] : facial sensation intact symmetrically [Cranial Nerves Accessory (XI - Cranial And Spinal)] : head turning and shoulder shrug symmetric [Cranial Nerves Vestibulocochlear (VIII)] : hearing was intact bilaterally [Cranial Nerves Hypoglossal (XII)] : there was no tongue deviation with protrusion [Motor Strength] : muscle strength was normal in all four extremities [Motor Tone] : muscle tone was normal in all four extremities [Sensation Tactile Decrease] : light touch was intact [Sensation Pain / Temperature Decrease] : pain and temperature was intact [Limited Balance] : the patient's balance was impaired [Full ROM] : full ROM [Sclera] : the sclera and conjunctiva were normal [PERRL With Normal Accommodation] : pupils were equal in size, round, reactive to light, with normal accommodation [Full Visual Field] : full visual field [Outer Ear] : the ears and nose were normal in appearance [Hearing Threshold Finger Rub Not [place]] : hearing was normal [Neck Appearance] : the appearance of the neck was normal [Neck Cervical Mass (___cm)] : no neck mass was observed [] : no respiratory distress [Respiration, Rhythm And Depth] : normal respiratory rhythm and effort [Exaggerated Use Of Accessory Muscles For Inspiration] : no accessory muscle use [Heart Rate And Rhythm] : heart rate was normal and rhythm regular [Abnormal Walk] : normal gait [Skin Color & Pigmentation] : normal skin color and pigmentation [Involuntary Movements] : no involuntary movements were seen [Tremor] : no tremor present [Dysdiadochokinesia Bilaterally] : not present [Coordination - Dysmetria Impaired Finger-to-Nose Bilateral] : not present [FreeTextEntry8] : unable to stand on tandem, no pronator drift [FreeTextEntry1] : mild pain with left lateral neck ROM
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
## 2020-02-25 ENCOUNTER — TRANSCRIPTION ENCOUNTER (OUTPATIENT)
Age: 30
End: 2020-02-25

## 2020-02-26 ENCOUNTER — OUTPATIENT (OUTPATIENT)
Dept: OUTPATIENT SERVICES | Facility: HOSPITAL | Age: 30
LOS: 1 days | End: 2020-02-26
Payer: COMMERCIAL

## 2020-02-26 ENCOUNTER — APPOINTMENT (OUTPATIENT)
Dept: NEUROSURGERY | Facility: CLINIC | Age: 30
End: 2020-02-26
Payer: COMMERCIAL

## 2020-02-26 VITALS
SYSTOLIC BLOOD PRESSURE: 138 MMHG | HEIGHT: 67 IN | TEMPERATURE: 98.4 F | DIASTOLIC BLOOD PRESSURE: 82 MMHG | RESPIRATION RATE: 17 BRPM | HEART RATE: 108 BPM | OXYGEN SATURATION: 96 % | BODY MASS INDEX: 33.74 KG/M2 | WEIGHT: 215 LBS

## 2020-02-26 DIAGNOSIS — Z33.2 ENCOUNTER FOR ELECTIVE TERMINATION OF PREGNANCY: Chronic | ICD-10-CM

## 2020-02-26 DIAGNOSIS — S06.0X0A CONCUSSION WITHOUT LOSS OF CONSCIOUSNESS, INITIAL ENCOUNTER: ICD-10-CM

## 2020-02-26 DIAGNOSIS — Z98.89 OTHER SPECIFIED POSTPROCEDURAL STATES: Chronic | ICD-10-CM

## 2020-02-26 PROCEDURE — 99214 OFFICE O/P EST MOD 30 MIN: CPT

## 2020-02-26 PROCEDURE — 70450 CT HEAD/BRAIN W/O DYE: CPT

## 2020-02-26 PROCEDURE — 70450 CT HEAD/BRAIN W/O DYE: CPT | Mod: 26

## 2020-02-26 NOTE — REVIEW OF SYSTEMS
[Changed Thought Patterns] : changed thought patterns [Memory Lapses or Loss] : memory loss [As Noted in HPI] : as noted in HPI [Numbness] : numbness [Tingling] : tingling [Migraine Headache] : migraine headaches [Anxiety] : anxiety [Negative] : Integumentary

## 2020-02-28 ENCOUNTER — EMERGENCY (EMERGENCY)
Facility: HOSPITAL | Age: 30
LOS: 1 days | Discharge: HOME | End: 2020-02-28
Attending: EMERGENCY MEDICINE
Payer: COMMERCIAL

## 2020-02-28 ENCOUNTER — APPOINTMENT (OUTPATIENT)
Dept: PAIN MANAGEMENT | Facility: CLINIC | Age: 30
End: 2020-02-28
Payer: COMMERCIAL

## 2020-02-28 VITALS
OXYGEN SATURATION: 100 % | TEMPERATURE: 98 F | HEIGHT: 67 IN | RESPIRATION RATE: 20 BRPM | HEART RATE: 89 BPM | DIASTOLIC BLOOD PRESSURE: 77 MMHG | WEIGHT: 210.1 LBS | SYSTOLIC BLOOD PRESSURE: 110 MMHG

## 2020-02-28 VITALS
DIASTOLIC BLOOD PRESSURE: 79 MMHG | HEART RATE: 89 BPM | BODY MASS INDEX: 33.74 KG/M2 | WEIGHT: 215 LBS | SYSTOLIC BLOOD PRESSURE: 115 MMHG | HEIGHT: 67 IN

## 2020-02-28 VITALS
RESPIRATION RATE: 18 BRPM | HEART RATE: 82 BPM | DIASTOLIC BLOOD PRESSURE: 66 MMHG | OXYGEN SATURATION: 98 % | SYSTOLIC BLOOD PRESSURE: 102 MMHG

## 2020-02-28 DIAGNOSIS — R42 DIZZINESS AND GIDDINESS: ICD-10-CM

## 2020-02-28 DIAGNOSIS — Z98.89 OTHER SPECIFIED POSTPROCEDURAL STATES: Chronic | ICD-10-CM

## 2020-02-28 DIAGNOSIS — Z33.2 ENCOUNTER FOR ELECTIVE TERMINATION OF PREGNANCY: Chronic | ICD-10-CM

## 2020-02-28 LAB
ALBUMIN SERPL ELPH-MCNC: 4.4 G/DL — SIGNIFICANT CHANGE UP (ref 3.3–5)
ALP SERPL-CCNC: 89 U/L — SIGNIFICANT CHANGE UP (ref 40–120)
ALT FLD-CCNC: 9 U/L — LOW (ref 10–45)
ANION GAP SERPL CALC-SCNC: 17 MMOL/L — SIGNIFICANT CHANGE UP (ref 5–17)
AST SERPL-CCNC: 12 U/L — SIGNIFICANT CHANGE UP (ref 10–40)
BASOPHILS # BLD AUTO: 0.03 K/UL — SIGNIFICANT CHANGE UP (ref 0–0.2)
BASOPHILS NFR BLD AUTO: 0.6 % — SIGNIFICANT CHANGE UP (ref 0–2)
BILIRUB SERPL-MCNC: 0.3 MG/DL — SIGNIFICANT CHANGE UP (ref 0.2–1.2)
BUN SERPL-MCNC: 12 MG/DL — SIGNIFICANT CHANGE UP (ref 7–23)
CALCIUM SERPL-MCNC: 9.4 MG/DL — SIGNIFICANT CHANGE UP (ref 8.4–10.5)
CHLORIDE SERPL-SCNC: 98 MMOL/L — SIGNIFICANT CHANGE UP (ref 96–108)
CO2 SERPL-SCNC: 23 MMOL/L — SIGNIFICANT CHANGE UP (ref 22–31)
CREAT SERPL-MCNC: 0.51 MG/DL — SIGNIFICANT CHANGE UP (ref 0.5–1.3)
EOSINOPHIL # BLD AUTO: 0.02 K/UL — SIGNIFICANT CHANGE UP (ref 0–0.5)
EOSINOPHIL NFR BLD AUTO: 0.4 % — SIGNIFICANT CHANGE UP (ref 0–6)
GLUCOSE SERPL-MCNC: 102 MG/DL — HIGH (ref 70–99)
HCG SERPL-ACNC: <2 MIU/ML — SIGNIFICANT CHANGE UP
HCT VFR BLD CALC: 41.4 % — SIGNIFICANT CHANGE UP (ref 34.5–45)
HGB BLD-MCNC: 13.3 G/DL — SIGNIFICANT CHANGE UP (ref 11.5–15.5)
IMM GRANULOCYTES NFR BLD AUTO: 0.2 % — SIGNIFICANT CHANGE UP (ref 0–1.5)
LYMPHOCYTES # BLD AUTO: 1.26 K/UL — SIGNIFICANT CHANGE UP (ref 1–3.3)
LYMPHOCYTES # BLD AUTO: 26.8 % — SIGNIFICANT CHANGE UP (ref 13–44)
MCHC RBC-ENTMCNC: 28.4 PG — SIGNIFICANT CHANGE UP (ref 27–34)
MCHC RBC-ENTMCNC: 32.1 GM/DL — SIGNIFICANT CHANGE UP (ref 32–36)
MCV RBC AUTO: 88.5 FL — SIGNIFICANT CHANGE UP (ref 80–100)
MONOCYTES # BLD AUTO: 0.49 K/UL — SIGNIFICANT CHANGE UP (ref 0–0.9)
MONOCYTES NFR BLD AUTO: 10.4 % — SIGNIFICANT CHANGE UP (ref 2–14)
NEUTROPHILS # BLD AUTO: 2.89 K/UL — SIGNIFICANT CHANGE UP (ref 1.8–7.4)
NEUTROPHILS NFR BLD AUTO: 61.6 % — SIGNIFICANT CHANGE UP (ref 43–77)
NRBC # BLD: 0 /100 WBCS — SIGNIFICANT CHANGE UP (ref 0–0)
PLATELET # BLD AUTO: 349 K/UL — SIGNIFICANT CHANGE UP (ref 150–400)
POTASSIUM SERPL-MCNC: 3.4 MMOL/L — LOW (ref 3.5–5.3)
POTASSIUM SERPL-SCNC: 3.4 MMOL/L — LOW (ref 3.5–5.3)
PROT SERPL-MCNC: 7.8 G/DL — SIGNIFICANT CHANGE UP (ref 6–8.3)
RBC # BLD: 4.68 M/UL — SIGNIFICANT CHANGE UP (ref 3.8–5.2)
RBC # FLD: 15 % — HIGH (ref 10.3–14.5)
SODIUM SERPL-SCNC: 138 MMOL/L — SIGNIFICANT CHANGE UP (ref 135–145)
WBC # BLD: 4.7 K/UL — SIGNIFICANT CHANGE UP (ref 3.8–10.5)
WBC # FLD AUTO: 4.7 K/UL — SIGNIFICANT CHANGE UP (ref 3.8–10.5)

## 2020-02-28 PROCEDURE — 85027 COMPLETE CBC AUTOMATED: CPT

## 2020-02-28 PROCEDURE — 96375 TX/PRO/DX INJ NEW DRUG ADDON: CPT

## 2020-02-28 PROCEDURE — 99284 EMERGENCY DEPT VISIT MOD MDM: CPT

## 2020-02-28 PROCEDURE — 99214 OFFICE O/P EST MOD 30 MIN: CPT

## 2020-02-28 PROCEDURE — 96374 THER/PROPH/DIAG INJ IV PUSH: CPT

## 2020-02-28 PROCEDURE — 80053 COMPREHEN METABOLIC PANEL: CPT

## 2020-02-28 PROCEDURE — 84702 CHORIONIC GONADOTROPIN TEST: CPT

## 2020-02-28 PROCEDURE — 99284 EMERGENCY DEPT VISIT MOD MDM: CPT | Mod: 25

## 2020-02-28 RX ORDER — KETOROLAC TROMETHAMINE 30 MG/ML
15 SYRINGE (ML) INJECTION ONCE
Refills: 0 | Status: DISCONTINUED | OUTPATIENT
Start: 2020-02-28 | End: 2020-02-28

## 2020-02-28 RX ORDER — METOCLOPRAMIDE HCL 10 MG
1 TABLET ORAL
Qty: 15 | Refills: 0
Start: 2020-02-28 | End: 2020-03-03

## 2020-02-28 RX ORDER — ONDANSETRON 8 MG/1
8 TABLET, ORALLY DISINTEGRATING ORAL DAILY
Qty: 30 | Refills: 3 | Status: ACTIVE | COMMUNITY
Start: 2020-02-28 | End: 1900-01-01

## 2020-02-28 RX ORDER — METOCLOPRAMIDE HCL 10 MG
10 TABLET ORAL ONCE
Refills: 0 | Status: COMPLETED | OUTPATIENT
Start: 2020-02-28 | End: 2020-02-28

## 2020-02-28 RX ORDER — DIPHENHYDRAMINE HCL 50 MG
25 CAPSULE ORAL ONCE
Refills: 0 | Status: COMPLETED | OUTPATIENT
Start: 2020-02-28 | End: 2020-02-28

## 2020-02-28 RX ORDER — METOCLOPRAMIDE HCL 10 MG
10 TABLET ORAL ONCE
Refills: 0 | Status: DISCONTINUED | OUTPATIENT
Start: 2020-02-28 | End: 2020-02-28

## 2020-02-28 RX ORDER — SODIUM CHLORIDE 9 MG/ML
1000 INJECTION INTRAMUSCULAR; INTRAVENOUS; SUBCUTANEOUS ONCE
Refills: 0 | Status: COMPLETED | OUTPATIENT
Start: 2020-02-28 | End: 2020-02-28

## 2020-02-28 RX ADMIN — Medication 25 MILLIGRAM(S): at 15:53

## 2020-02-28 RX ADMIN — SODIUM CHLORIDE 1000 MILLILITER(S): 9 INJECTION INTRAMUSCULAR; INTRAVENOUS; SUBCUTANEOUS at 15:52

## 2020-02-28 RX ADMIN — Medication 10 MILLIGRAM(S): at 15:53

## 2020-02-28 RX ADMIN — Medication 15 MILLIGRAM(S): at 16:30
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
## 2020-02-28 NOTE — ED PROVIDER NOTE - PATIENT PORTAL LINK FT
You can access the FollowMyHealth Patient Portal offered by [facility] by registering at the following website: [email or web address]. By joining [employer]’s FollowMyHealth portal, you will also be able to view your health information using other applications (apps) compatible with our system.

## 2020-02-28 NOTE — DATA REVIEWED
[de-identified] : CThead today:  SANDRAL [de-identified] : 2/15/2020 c-spine: At C5/C6 there is a left lateral recess disc protrusion resulting in \par effacement of the left lateral recess and contacting of the exiting left C6 \par nerve root.

## 2020-02-28 NOTE — ED ADULT NURSE NOTE - CAS TRG GENERAL NORM CIRC DET
Strong peripheral pulses negative Regular rate & rhythm, normal S1, S2; no murmurs, gallops or rubs; no S3, S4
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
## 2020-02-28 NOTE — ED PROVIDER NOTE - CARE PROVIDER_API CALL
Garth Ordaz)  Internal Medicine; Pulmonary Disease  [address] [place] [address]  Phone: [phone]  Fax: [phone]  Follow Up Time:

## 2020-02-28 NOTE — ED PROVIDER NOTE - NSFOLLOWUPINSTRUCTIONS_ED_ALL_ED_FT
Please follow up with your Neurologist within one week for further evaluation. If your headache returns, please take a reglan every 8 hours as needed. Please stay hydrated. If your symptoms worsens, please return to the Emergency room.
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
## 2020-02-28 NOTE — ED ADULT NURSE NOTE - NEURO GAIT
Patient is requesting a script for a wheel chair. She would like the script faxed to her social work at [facility] beds at the [facility] Maria E Yeung at [phone]   steady
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
## 2020-02-28 NOTE — ASSESSMENT
[FreeTextEntry1] : 1.  ketorolac bridge (then ketoprofen if needed)\par zofran for 1 week then prn\par prn hydroxyzine\par increase paxil to 20mg then 30mg.

## 2020-02-28 NOTE — PHYSICAL EXAM
[General Appearance - In No Acute Distress] : in no acute distress [General Appearance - Alert] : alert [General Appearance - Well-Appearing] : healthy appearing [General Appearance - Well Developed] : well developed [General Appearance - Well Nourished] : well nourished [Impaired Insight] : insight and judgment were intact [Oriented To Time, Place, And Person] : oriented to person, place, and time [Memory Recent] : recent memory was not impaired [Affect] : the affect was normal [Memory Remote] : remote memory was not impaired [Person] : oriented to person [Place] : oriented to place [Time] : oriented to time [Remote Intact] : remote memory intact [Short Term Intact] : short term memory intact [Visual Intact] : visual attention was ~T not ~L decreased [Registration Intact] : recent registration memory intact [Concentration Intact] : normal concentrating ability [Comprehension] : comprehension intact [Naming Objects] : no difficulty naming common objects [Fluency] : fluency intact [Past History] : adequate knowledge of personal past history [Current Events] : adequate knowledge of current events [Cranial Nerves Trigeminal (V)] : facial sensation intact symmetrically [Cranial Nerves Oculomotor (III)] : extraocular motion intact [Vocabulary] : adequate range of vocabulary [Cranial Nerves Glossopharyngeal (IX)] : tongue and palate midline [Cranial Nerves Accessory (XI - Cranial And Spinal)] : head turning and shoulder shrug symmetric [Cranial Nerves Facial (VII)] : face symmetrical [Cranial Nerves Hypoglossal (XII)] : there was no tongue deviation with protrusion [Motor Tone] : muscle tone was normal in all four extremities [Motor Strength] : muscle strength was normal in all four extremities [No Muscle Atrophy] : normal bulk in all four extremities [Motor Strength Upper Extremities Bilaterally] : strength was normal in both upper extremities [Motor Handedness Left-Handed] : the patient is left hand dominant [Sensation Tactile Decrease] : light touch was intact [Motor Strength Lower Extremities Bilaterally] : strength was normal in both lower extremities [Allodynia] : no ~T allodynia present [Balance] : balance was intact [Limited Balance] : balance was intact [Dysdiadochokinesia Bilaterally] : not present [Tremor] : no tremor present [Past-pointing] : there was no past-pointing [Sclera] : the sclera and conjunctiva were normal [Coordination - Dysmetria Impaired Finger-to-Nose Bilateral] : not present [PERRL With Normal Accommodation] : pupils were equal in size, round, reactive to light, with normal accommodation [Extraocular Movements] : extraocular movements were intact [Outer Ear] : the ears and nose were normal in appearance [Neck Appearance] : the appearance of the neck was normal [Exaggerated Use Of Accessory Muscles For Inspiration] : no accessory muscle use [Neck Cervical Mass (___cm)] : no neck mass was observed [Edema] : there was no peripheral edema [Nail Clubbing] : no clubbing  or cyanosis of the fingernails [Abnormal Walk] : normal gait [Involuntary Movements] : no involuntary movements were seen [Skin Color & Pigmentation] : normal skin color and pigmentation [] : no rash

## 2020-02-28 NOTE — PHYSICAL EXAM
[General Appearance - Alert] : alert [General Appearance - Well-Appearing] : healthy appearing [General Appearance - In No Acute Distress] : in no acute distress [General Appearance - Well Nourished] : well nourished [Affect] : the affect was normal [Oriented To Time, Place, And Person] : oriented to person, place, and time [Impaired Insight] : insight and judgment were intact [Memory Recent] : recent memory was not impaired [Place] : oriented to place [Person] : oriented to person [Cranial Nerves Optic (II)] : visual acuity intact bilaterally,  pupils equal round and reactive to light [Time] : oriented to time [Short Term Intact] : short term memory intact [Cranial Nerves Trigeminal (V)] : facial sensation intact symmetrically [Cranial Nerves Oculomotor (III)] : extraocular motion intact [Cranial Nerves Vestibulocochlear (VIII)] : hearing was intact bilaterally [Cranial Nerves Facial (VII)] : face symmetrical [Cranial Nerves Accessory (XI - Cranial And Spinal)] : head turning and shoulder shrug symmetric [Motor Tone] : muscle tone was normal in all four extremities [Cranial Nerves Hypoglossal (XII)] : there was no tongue deviation with protrusion [Motor Strength] : muscle strength was normal in all four extremities [Sensation Tactile Decrease] : light touch was intact [Sensation Pain / Temperature Decrease] : pain and temperature was intact [Balance] : balance was intact [Sclera] : the sclera and conjunctiva were normal [Outer Ear] : the ears and nose were normal in appearance [Full Visual Field] : full visual field [PERRL With Normal Accommodation] : pupils were equal in size, round, reactive to light, with normal accommodation [Hearing Threshold Finger Rub Not [place]] : hearing was normal [Neck Appearance] : the appearance of the neck was normal [Neck Cervical Mass (___cm)] : no neck mass was observed [Respiration, Rhythm And Depth] : normal respiratory rhythm and effort [] : no respiratory distress [Abnormal Walk] : normal gait [Involuntary Movements] : no involuntary movements were seen [Heart Rate And Rhythm] : heart rate was normal and rhythm regular [Limited Balance] : balance was intact [FreeTextEntry8] : no pronator drift, stable on tandem standing

## 2020-03-01 ENCOUNTER — OUTPATIENT (OUTPATIENT)
Dept: OUTPATIENT SERVICES | Facility: HOSPITAL | Age: 30
LOS: 1 days | End: 2020-03-01
Payer: MEDICAID

## 2020-03-01 DIAGNOSIS — Z98.89 OTHER SPECIFIED POSTPROCEDURAL STATES: Chronic | ICD-10-CM

## 2020-03-01 DIAGNOSIS — Z33.2 ENCOUNTER FOR ELECTIVE TERMINATION OF PREGNANCY: Chronic | ICD-10-CM

## 2020-03-01 PROCEDURE — G9001: CPT

## 2020-03-13 ENCOUNTER — APPOINTMENT (OUTPATIENT)
Dept: NEUROSURGERY | Facility: CLINIC | Age: 30
End: 2020-03-13
Payer: COMMERCIAL

## 2020-03-13 VITALS
DIASTOLIC BLOOD PRESSURE: 82 MMHG | HEART RATE: 102 BPM | WEIGHT: 215 LBS | HEIGHT: 67 IN | OXYGEN SATURATION: 97 % | SYSTOLIC BLOOD PRESSURE: 124 MMHG | RESPIRATION RATE: 16 BRPM | BODY MASS INDEX: 33.74 KG/M2

## 2020-03-13 PROCEDURE — 99214 OFFICE O/P EST MOD 30 MIN: CPT
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
## 2020-03-13 NOTE — REASON FOR VISIT
[Follow-Up: _____] : a [unfilled] follow-up visit [Family Member] : family member [FreeTextEntry1] : concussion
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
## 2020-03-13 NOTE — REVIEW OF SYSTEMS
[Memory Lapses or Loss] : memory loss [Changed Thought Patterns] : changed thought patterns [Numbness] : numbness [Tingling] : tingling [Dizziness] : dizziness [Sleep Disturbances] : sleep disturbances [Anxiety] : anxiety [Emotional Problems] : emotional problems [Negative] : Heme/Lymph [de-identified] : headache

## 2020-03-13 NOTE — PHYSICAL EXAM
[General Appearance - Alert] : alert [General Appearance - In No Acute Distress] : in no acute distress [General Appearance - Well Nourished] : well nourished [General Appearance - Well-Appearing] : healthy appearing [Oriented To Time, Place, And Person] : oriented to person, place, and time [Impaired Insight] : insight and judgment were intact [Affect] : the affect was normal [Memory Recent] : recent memory was not impaired [Person] : oriented to person [Place] : oriented to place [Time] : oriented to time [Short Term Intact] : short term memory intact [Cranial Nerves Optic (II)] : visual acuity intact bilaterally,  pupils equal round and reactive to light [Cranial Nerves Oculomotor (III)] : extraocular motion intact [Cranial Nerves Trigeminal (V)] : facial sensation intact symmetrically [Cranial Nerves Facial (VII)] : face symmetrical [Cranial Nerves Vestibulocochlear (VIII)] : hearing was intact bilaterally [Cranial Nerves Accessory (XI - Cranial And Spinal)] : head turning and shoulder shrug symmetric [Cranial Nerves Hypoglossal (XII)] : there was no tongue deviation with protrusion [Motor Tone] : muscle tone was normal in all four extremities [Motor Strength] : muscle strength was normal in all four extremities [Sensation Tactile Decrease] : light touch was intact [Sensation Pain / Temperature Decrease] : pain and temperature was intact [Sensation Vibration Decrease] : vibration was intact [Sclera] : the sclera and conjunctiva were normal [PERRL With Normal Accommodation] : pupils were equal in size, round, reactive to light, with normal accommodation [Full Visual Field] : full visual field [Outer Ear] : the ears and nose were normal in appearance [Hearing Threshold Finger Rub Not [place]] : hearing was normal [Both Tympanic Membranes Were Examined] : both tympanic membranes were normal [Neck Appearance] : the appearance of the neck was normal [Neck Cervical Mass (___cm)] : no neck mass was observed [] : no respiratory distress [Respiration, Rhythm And Depth] : normal respiratory rhythm and effort [Exaggerated Use Of Accessory Muscles For Inspiration] : no accessory muscle use [Heart Rate And Rhythm] : heart rate was normal and rhythm regular [Involuntary Movements] : no involuntary movements were seen [Skin Color & Pigmentation] : normal skin color and pigmentation [Abnormal Walk] : normal gait [Balance] : balance was intact [FreeTextEntry9] : no pronator drift, no instability on tandem

## 2020-03-17 ENCOUNTER — TRANSCRIPTION ENCOUNTER (OUTPATIENT)
Age: 30
End: 2020-03-17

## 2020-03-20 ENCOUNTER — APPOINTMENT (OUTPATIENT)
Dept: PAIN MANAGEMENT | Facility: CLINIC | Age: 30
End: 2020-03-20

## 2020-03-20 ENCOUNTER — TRANSCRIPTION ENCOUNTER (OUTPATIENT)
Age: 30
End: 2020-03-20

## 2020-03-23 ENCOUNTER — TRANSCRIPTION ENCOUNTER (OUTPATIENT)
Age: 30
End: 2020-03-23

## 2020-03-24 ENCOUNTER — APPOINTMENT (OUTPATIENT)
Dept: RHEUMATOLOGY | Facility: CLINIC | Age: 30
End: 2020-03-24
Payer: COMMERCIAL

## 2020-03-24 ENCOUNTER — LABORATORY RESULT (OUTPATIENT)
Age: 30
End: 2020-03-24

## 2020-03-24 PROCEDURE — 36415 COLL VENOUS BLD VENIPUNCTURE: CPT

## 2020-03-24 PROCEDURE — 96413 CHEMO IV INFUSION 1 HR: CPT

## 2020-03-30 ENCOUNTER — TRANSCRIPTION ENCOUNTER (OUTPATIENT)
Age: 30
End: 2020-03-30

## 2020-03-30 DIAGNOSIS — Z71.89 OTHER SPECIFIED COUNSELING: ICD-10-CM

## 2020-04-02 ENCOUNTER — TRANSCRIPTION ENCOUNTER (OUTPATIENT)
Age: 30
End: 2020-04-02

## 2020-04-06 ENCOUNTER — TRANSCRIPTION ENCOUNTER (OUTPATIENT)
Age: 30
End: 2020-04-06

## 2020-04-06 ENCOUNTER — APPOINTMENT (OUTPATIENT)
Dept: NEUROSURGERY | Facility: CLINIC | Age: 30
End: 2020-04-06
Payer: COMMERCIAL

## 2020-04-06 PROCEDURE — 99212 OFFICE O/P EST SF 10 MIN: CPT | Mod: 95
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
## 2020-04-09 ENCOUNTER — TRANSCRIPTION ENCOUNTER (OUTPATIENT)
Age: 30
End: 2020-04-09

## 2020-04-16 ENCOUNTER — EMERGENCY (EMERGENCY)
Facility: HOSPITAL | Age: 30
LOS: 0 days | Discharge: HOME | End: 2020-04-16
Payer: MEDICAID

## 2020-04-16 VITALS
HEART RATE: 95 BPM | TEMPERATURE: 98 F | SYSTOLIC BLOOD PRESSURE: 108 MMHG | OXYGEN SATURATION: 100 % | RESPIRATION RATE: 18 BRPM | DIASTOLIC BLOOD PRESSURE: 71 MMHG

## 2020-04-16 DIAGNOSIS — Z88.5 ALLERGY STATUS TO NARCOTIC AGENT: ICD-10-CM

## 2020-04-16 DIAGNOSIS — Z88.0 ALLERGY STATUS TO PENICILLIN: ICD-10-CM

## 2020-04-16 DIAGNOSIS — J34.89 OTHER SPECIFIED DISORDERS OF NOSE AND NASAL SINUSES: ICD-10-CM

## 2020-04-16 DIAGNOSIS — U07.1 COVID-19: ICD-10-CM

## 2020-04-16 DIAGNOSIS — M19.90 UNSPECIFIED OSTEOARTHRITIS, UNSPECIFIED SITE: ICD-10-CM

## 2020-04-16 DIAGNOSIS — J43.9 EMPHYSEMA, UNSPECIFIED: ICD-10-CM

## 2020-04-16 DIAGNOSIS — Z98.89 OTHER SPECIFIED POSTPROCEDURAL STATES: Chronic | ICD-10-CM

## 2020-04-16 DIAGNOSIS — R05 COUGH: ICD-10-CM

## 2020-04-16 DIAGNOSIS — Z91.030 BEE ALLERGY STATUS: ICD-10-CM

## 2020-04-16 DIAGNOSIS — J45.909 UNSPECIFIED ASTHMA, UNCOMPLICATED: ICD-10-CM

## 2020-04-16 DIAGNOSIS — D64.9 ANEMIA, UNSPECIFIED: ICD-10-CM

## 2020-04-16 DIAGNOSIS — R50.9 FEVER, UNSPECIFIED: ICD-10-CM

## 2020-04-16 DIAGNOSIS — J02.9 ACUTE PHARYNGITIS, UNSPECIFIED: ICD-10-CM

## 2020-04-16 DIAGNOSIS — M32.9 SYSTEMIC LUPUS ERYTHEMATOSUS, UNSPECIFIED: ICD-10-CM

## 2020-04-16 DIAGNOSIS — B33.8 OTHER SPECIFIED VIRAL DISEASES: ICD-10-CM

## 2020-04-16 DIAGNOSIS — M06.9 RHEUMATOID ARTHRITIS, UNSPECIFIED: ICD-10-CM

## 2020-04-16 DIAGNOSIS — Z33.2 ENCOUNTER FOR ELECTIVE TERMINATION OF PREGNANCY: Chronic | ICD-10-CM

## 2020-04-16 PROCEDURE — 99283 EMERGENCY DEPT VISIT LOW MDM: CPT

## 2020-04-16 PROCEDURE — 87635 SARS-COV-2 COVID-19 AMP PRB: CPT

## 2020-04-16 NOTE — ED STATDOCS - OBJECTIVE STATEMENT
Pt presents to ED with fever, cough, runny nose, body aches, sore throat x  5 days.    Pt concerned for possible COVID-19.   Pt here for testing.

## 2020-04-16 NOTE — ED STATDOCS - NS ED ROS FT
ROS:   Constitutional- +fever, +chills.    ENT- +rhinorrhea, + sore throat, no congestion.    Cardiac- no chest pain, no palpitations,   Respiratory- +cough, +SOB    Abdomen- No nausea, no vomiting, no diarrhea.    Urinary- no dysuria, no urgency, no frequency.    Skin- No rashes

## 2020-04-16 NOTE — ED ADULT NURSE NOTE - OBJECTIVE STATEMENT
DISCHARGE INSTRUCTIONS REVIEWED WITH PATIENT VERBALLY, PT VERBALIZED UNDERSTANDING OF DISCHARGE INSTRUCTIONS. PAPER COPY OF DISCHARGE INSTRUCTIONS GIVEN TO PATIENT WITH SELF QUARENTINE AND COVID 19 INFORMATION.pt provides verbal consent to receive results via text or email

## 2020-04-16 NOTE — ED STATDOCS - PHYSICAL EXAMINATION
Constitutional: NAD AAOx3. Nontoxic, well appearing. Speaking full sentences  w/o distress  Eyes: EOMI, pupils equal  Head: Normocephalic atraumatic  Mouth: no airway obstruction  Cardiac: [id number]   Resp: Lungs CTAB  GI: Abd s/nt/nd  Neuro: motor and sensory intact

## 2020-04-16 NOTE — ED STATDOCS - PATIENT PORTAL LINK FT
You can access the FollowMyHealth Patient Portal offered by [facility] by registering at the following website: [email or web address]. By joining [employer]’s FollowMyHealth portal, you will also be able to view your health information using other applications (apps) compatible with our system.

## 2020-04-17 LAB — SARS-COV-2 RNA SPEC QL NAA+PROBE: DETECTED

## 2020-04-25 ENCOUNTER — EMERGENCY (EMERGENCY)
Facility: HOSPITAL | Age: 30
LOS: 1 days | Discharge: HOME | End: 2020-04-25
Admitting: EMERGENCY MEDICINE
Payer: MEDICAID

## 2020-04-25 VITALS
OXYGEN SATURATION: 98 % | SYSTOLIC BLOOD PRESSURE: 120 MMHG | RESPIRATION RATE: 18 BRPM | DIASTOLIC BLOOD PRESSURE: 73 MMHG | TEMPERATURE: 100 F | HEART RATE: 95 BPM

## 2020-04-25 DIAGNOSIS — Z33.2 ENCOUNTER FOR ELECTIVE TERMINATION OF PREGNANCY: Chronic | ICD-10-CM

## 2020-04-25 DIAGNOSIS — Z98.89 OTHER SPECIFIED POSTPROCEDURAL STATES: Chronic | ICD-10-CM

## 2020-04-25 LAB
ALBUMIN SERPL ELPH-MCNC: 4.1 G/DL — SIGNIFICANT CHANGE UP (ref 3.3–5)
ALP SERPL-CCNC: 63 U/L — SIGNIFICANT CHANGE UP (ref 40–120)
ALT FLD-CCNC: 21 U/L — SIGNIFICANT CHANGE UP (ref 4–33)
ANION GAP SERPL CALC-SCNC: 13 MMO/L — SIGNIFICANT CHANGE UP (ref 7–14)
ANISOCYTOSIS BLD QL: SLIGHT — SIGNIFICANT CHANGE UP
AST SERPL-CCNC: 27 U/L — SIGNIFICANT CHANGE UP (ref 4–32)
BASOPHILS # BLD AUTO: 0.01 K/UL — SIGNIFICANT CHANGE UP (ref 0–0.2)
BASOPHILS NFR BLD AUTO: 0.3 % — SIGNIFICANT CHANGE UP (ref 0–2)
BASOPHILS NFR SPEC: 0 % — SIGNIFICANT CHANGE UP (ref 0–2)
BILIRUB SERPL-MCNC: 0.2 MG/DL — SIGNIFICANT CHANGE UP (ref 0.2–1.2)
BLASTS # FLD: 0 % — SIGNIFICANT CHANGE UP (ref 0–0)
BUN SERPL-MCNC: 11 MG/DL — SIGNIFICANT CHANGE UP (ref 7–23)
CALCIUM SERPL-MCNC: 9.1 MG/DL — SIGNIFICANT CHANGE UP (ref 8.4–10.5)
CHLORIDE SERPL-SCNC: 102 MMOL/L — SIGNIFICANT CHANGE UP (ref 98–107)
CO2 SERPL-SCNC: 26 MMOL/L — SIGNIFICANT CHANGE UP (ref 22–31)
CREAT SERPL-MCNC: 0.63 MG/DL — SIGNIFICANT CHANGE UP (ref 0.5–1.3)
EOSINOPHIL # BLD AUTO: 0 K/UL — SIGNIFICANT CHANGE UP (ref 0–0.5)
EOSINOPHIL NFR BLD AUTO: 0 % — SIGNIFICANT CHANGE UP (ref 0–6)
EOSINOPHIL NFR FLD: 0 % — SIGNIFICANT CHANGE UP (ref 0–6)
GIANT PLATELETS BLD QL SMEAR: PRESENT — SIGNIFICANT CHANGE UP
GLUCOSE SERPL-MCNC: 99 MG/DL — SIGNIFICANT CHANGE UP (ref 70–99)
HCG SERPL-ACNC: < 5 MIU/ML — SIGNIFICANT CHANGE UP
HCT VFR BLD CALC: 41 % — SIGNIFICANT CHANGE UP (ref 34.5–45)
HGB BLD-MCNC: 13.5 G/DL — SIGNIFICANT CHANGE UP (ref 11.5–15.5)
IMM GRANULOCYTES NFR BLD AUTO: 0 % — SIGNIFICANT CHANGE UP (ref 0–1.5)
LYMPHOCYTES # BLD AUTO: 2.42 K/UL — SIGNIFICANT CHANGE UP (ref 1–3.3)
LYMPHOCYTES # BLD AUTO: 68 % — HIGH (ref 13–44)
LYMPHOCYTES NFR SPEC AUTO: 51.4 % — HIGH (ref 13–44)
MACROCYTES BLD QL: SLIGHT — SIGNIFICANT CHANGE UP
MCHC RBC-ENTMCNC: 28.9 PG — SIGNIFICANT CHANGE UP (ref 27–34)
MCHC RBC-ENTMCNC: 32.9 % — SIGNIFICANT CHANGE UP (ref 32–36)
MCV RBC AUTO: 87.8 FL — SIGNIFICANT CHANGE UP (ref 80–100)
METAMYELOCYTES # FLD: 1.9 % — HIGH (ref 0–1)
MONOCYTES # BLD AUTO: 0.28 K/UL — SIGNIFICANT CHANGE UP (ref 0–0.9)
MONOCYTES NFR BLD AUTO: 7.9 % — SIGNIFICANT CHANGE UP (ref 2–14)
MONOCYTES NFR BLD: 10.5 % — HIGH (ref 2–9)
MYELOCYTES NFR BLD: 0 % — SIGNIFICANT CHANGE UP (ref 0–0)
NEUTROPHIL AB SER-ACNC: 15.2 % — LOW (ref 43–77)
NEUTROPHILS # BLD AUTO: 0.85 K/UL — LOW (ref 1.8–7.4)
NEUTROPHILS NFR BLD AUTO: 23.8 % — LOW (ref 43–77)
NEUTS BAND # BLD: 8.6 % — HIGH (ref 0–6)
NRBC # FLD: 0 K/UL — SIGNIFICANT CHANGE UP (ref 0–0)
OTHER - HEMATOLOGY %: 0 — SIGNIFICANT CHANGE UP
OVALOCYTES BLD QL SMEAR: SLIGHT — SIGNIFICANT CHANGE UP
PLATELET # BLD AUTO: 285 K/UL — SIGNIFICANT CHANGE UP (ref 150–400)
PLATELET COUNT - ESTIMATE: NORMAL — SIGNIFICANT CHANGE UP
PMV BLD: 10 FL — SIGNIFICANT CHANGE UP (ref 7–13)
POIKILOCYTOSIS BLD QL AUTO: SLIGHT — SIGNIFICANT CHANGE UP
POTASSIUM SERPL-MCNC: 3.4 MMOL/L — LOW (ref 3.5–5.3)
POTASSIUM SERPL-SCNC: 3.4 MMOL/L — LOW (ref 3.5–5.3)
PROMYELOCYTES # FLD: 0 % — SIGNIFICANT CHANGE UP (ref 0–0)
PROT SERPL-MCNC: 7.8 G/DL — SIGNIFICANT CHANGE UP (ref 6–8.3)
RBC # BLD: 4.67 M/UL — SIGNIFICANT CHANGE UP (ref 3.8–5.2)
RBC # FLD: 15.1 % — HIGH (ref 10.3–14.5)
SMUDGE CELLS # BLD: PRESENT — SIGNIFICANT CHANGE UP
SODIUM SERPL-SCNC: 141 MMOL/L — SIGNIFICANT CHANGE UP (ref 135–145)
VARIANT LYMPHS # BLD: 11.4 % — SIGNIFICANT CHANGE UP
WBC # BLD: 3.56 K/UL — LOW (ref 3.8–10.5)
WBC # FLD AUTO: 3.56 K/UL — LOW (ref 3.8–10.5)

## 2020-04-25 PROCEDURE — 99284 EMERGENCY DEPT VISIT MOD MDM: CPT

## 2020-04-25 PROCEDURE — 71045 X-RAY EXAM CHEST 1 VIEW: CPT | Mod: 26

## 2020-04-25 RX ORDER — METOCLOPRAMIDE HCL 10 MG
10 TABLET ORAL ONCE
Refills: 0 | Status: COMPLETED | OUTPATIENT
Start: 2020-04-25 | End: 2020-04-25

## 2020-04-25 RX ORDER — SODIUM CHLORIDE 9 MG/ML
1000 INJECTION INTRAMUSCULAR; INTRAVENOUS; SUBCUTANEOUS ONCE
Refills: 0 | Status: COMPLETED | OUTPATIENT
Start: 2020-04-25 | End: 2020-04-25

## 2020-04-25 RX ORDER — KETOROLAC TROMETHAMINE 30 MG/ML
15 SYRINGE (ML) INJECTION ONCE
Refills: 0 | Status: DISCONTINUED | OUTPATIENT
Start: 2020-04-25 | End: 2020-04-25

## 2020-04-25 RX ADMIN — SODIUM CHLORIDE 1000 MILLILITER(S): 9 INJECTION INTRAMUSCULAR; INTRAVENOUS; SUBCUTANEOUS at 14:55

## 2020-04-25 RX ADMIN — Medication 15 MILLIGRAM(S): at 14:55

## 2020-04-25 RX ADMIN — Medication 10 MILLIGRAM(S): at 14:55

## 2020-04-25 NOTE — ED PROVIDER NOTE - PATIENT PORTAL LINK FT
You can access the FollowMyHealth Patient Portal offered by [facility] by registering at the following website: [email or web address]. By joining [employer]’s FollowMyHealth portal, you will also be able to view your health information using other applications (apps) compatible with our system.

## 2020-04-25 NOTE — ED PROVIDER NOTE - CONSTITUTIONAL, MLM
normal... non-toxic appearing, awake, alert, oriented to person, place, time/situation and in no apparent distress.

## 2020-04-25 NOTE — ED PROVIDER NOTE - CLINICAL SUMMARY MEDICAL DECISION MAKING FREE TEXT BOX
31 yo F, nonsmoker with PMH of asthma (never intubated), RA (on infusion), anemia, presents to ER c/o headache x2 weeks, cough, sob since last week. non-toxic appearing female, recently tested positive for COVID19 last week, p/w frontal headache, sob, loss of appetite, n/v/d, pt is afebrile, no respiratory failure, no hypoxia, no focal neuro deficits, pt doesn't want admission; likely COVID infection, viral gastroenteritis, dehydration, electrolyte imbalance. Plan: check labs, UCG, CXR to r/o secondary bacterial pneumonia, give IVF for hydration, Toradol for headache (pt agrees to since Tylenol not working at home), reglan for headache, and ambulatory pulse check, and reassess.

## 2020-04-25 NOTE — ED ADULT NURSE NOTE - OBJECTIVE STATEMENT
pt is sitting up in bed AAOx3. pt reports chest pain and N/V. reports this going on for 2 weeks now.

## 2020-04-25 NOTE — ED PROVIDER NOTE - NSFOLLOWUPINSTRUCTIONS_ED_ALL_ED_FT
Follow up with your PMD within 48-72 hours.  Rest, increase fluids. Take tylenol 650mg every 6 hours for pain or temp greater than 99.9. Worsening or continued fever, chills, weakness, nausea, vomiting, abdominal pain or new concerning symptoms return to Emergency Department.

## 2020-04-25 NOTE — ED PROVIDER NOTE - MUSCULOSKELETAL, MLM
Spine appears normal, range of motion is not limited, no muscle or joint tenderness; no leg swelling b/l; no calf tenderness b/l; neg Dori's test b/l.

## 2020-04-25 NOTE — ED PROVIDER NOTE - PROGRESS NOTE DETAILS
PA SANDEEP: labs reviewed, CXR with small opacity in b/ lower lobes compatible with COVID. Patient reassessed, sitting comfortably in chair in NAD, denies any complaints. States feeling better, symptoms improved, headache resolved. Ambulatory pulse O2, sating 99%. Pending HCG result. Pt feels comfortable going home. Will continue to monitor and reassess. PA SANDEEP: HCG neg. Pt is medically stable for discharge and follow up with PMD. The patient was given verbal and written discharge instructions. Specifically, instructions when to return to the ED and when to seek follow-up from their pcp was discussed. Any specialty follow-up was discussed, including how to make an appointment.  Instructions were discussed in simple, plain language and was understood by the patient. The patient understands that should their symptoms worsen or any new symptoms arise, they should return to the ED immediately for further evaluation. All pt's questions were answered. Patient verbalizes understanding.

## 2020-04-25 NOTE — ED ADULT TRIAGE NOTE - CHIEF COMPLAINT QUOTE
PT C/O SOB & dyspnea worsening over a few days. States tested positive for COVID 19 last week, Also C/O fevers, headaches, myalgias, cough. SpO2 98 % on room air. Took Tylenol 100mg approximately 10 pm PHX: Asthma.

## 2020-04-26 NOTE — ED POST DISCHARGE NOTE - REASON FOR FOLLOW-UP
Other CBC: Bands 8.6%. Patient with known COVID-19. Patient contact # [phone]. Discussed with patient CBC bands 8.6%. Patient states woke up this morning and feeling much better only has a mild cough after being sick for 2 week. Discussed with patient to follow up with MD. Patient followed by NP  Nicole Friedman and will have NP check results. Discussed with patient need to return to ED if symptoms don't continue to improve or recur or develops any new or worsening symptoms that are of concern.

## 2020-05-04 ENCOUNTER — APPOINTMENT (OUTPATIENT)
Dept: GASTROENTEROLOGY | Facility: CLINIC | Age: 30
End: 2020-05-04
Payer: COMMERCIAL

## 2020-05-04 DIAGNOSIS — R11.2 NAUSEA WITH VOMITING, UNSPECIFIED: ICD-10-CM

## 2020-05-04 PROCEDURE — 99212 OFFICE O/P EST SF 10 MIN: CPT | Mod: 95

## 2020-05-19 ENCOUNTER — LABORATORY RESULT (OUTPATIENT)
Age: 30
End: 2020-05-19

## 2020-05-19 ENCOUNTER — APPOINTMENT (OUTPATIENT)
Dept: RHEUMATOLOGY | Facility: CLINIC | Age: 30
End: 2020-05-19
Payer: COMMERCIAL

## 2020-05-19 PROCEDURE — 96413 CHEMO IV INFUSION 1 HR: CPT

## 2020-05-19 PROCEDURE — 36415 COLL VENOUS BLD VENIPUNCTURE: CPT

## 2020-05-20 ENCOUNTER — APPOINTMENT (OUTPATIENT)
Dept: INTERNAL MEDICINE | Facility: CLINIC | Age: 30
End: 2020-05-20
Payer: MEDICAID

## 2020-05-20 DIAGNOSIS — U07.1 COVID-19: ICD-10-CM

## 2020-05-20 DIAGNOSIS — J06.9 ACUTE UPPER RESPIRATORY INFECTION, UNSPECIFIED: ICD-10-CM

## 2020-05-20 DIAGNOSIS — K21.9 GASTRO-ESOPHAGEAL REFLUX DISEASE W/OUT ESOPHAGITIS: ICD-10-CM

## 2020-05-20 DIAGNOSIS — R19.7 DIARRHEA, UNSPECIFIED: ICD-10-CM

## 2020-05-20 DIAGNOSIS — I10 ESSENTIAL (PRIMARY) HYPERTENSION: ICD-10-CM

## 2020-05-20 DIAGNOSIS — J45.909 UNSPECIFIED ASTHMA, UNCOMPLICATED: ICD-10-CM

## 2020-05-20 DIAGNOSIS — M05.769 RHEUMATOID ARTHRITIS WITH RHEUMATOID FACTOR OF UNSPECIFIED KNEE W/OUT ORGAN OR SYSTEMS INVOLVEMENT: ICD-10-CM

## 2020-05-20 PROCEDURE — 99214 OFFICE O/P EST MOD 30 MIN: CPT | Mod: 95

## 2020-05-20 RX ORDER — LEVOFLOXACIN 500 MG/1
500 TABLET, FILM COATED ORAL DAILY
Qty: 7 | Refills: 3 | Status: DISCONTINUED | COMMUNITY
Start: 2020-02-06 | End: 2020-05-20

## 2020-05-20 RX ORDER — AMOXICILLIN 875 MG/1
875 TABLET, FILM COATED ORAL
Qty: 10 | Refills: 0 | Status: DISCONTINUED | COMMUNITY
Start: 2020-04-21 | End: 2020-05-20

## 2020-05-20 RX ORDER — METHYLPREDNISOLONE 4 MG/1
4 TABLET ORAL
Qty: 21 | Refills: 0 | Status: DISCONTINUED | COMMUNITY
Start: 2020-02-12 | End: 2020-05-20

## 2020-05-20 RX ORDER — FLUTICASONE PROPIONATE 50 UG/1
50 SPRAY, METERED NASAL TWICE DAILY
Qty: 1 | Refills: 2 | Status: DISCONTINUED | COMMUNITY
Start: 2020-04-21 | End: 2020-05-20

## 2020-05-20 RX ORDER — DOXYCYCLINE 100 MG/1
100 CAPSULE ORAL TWICE DAILY
Qty: 14 | Refills: 0 | Status: DISCONTINUED | COMMUNITY
Start: 2020-01-13 | End: 2020-05-20

## 2020-05-20 RX ORDER — AZITHROMYCIN 250 MG/1
250 TABLET, FILM COATED ORAL
Qty: 1 | Refills: 0 | Status: DISCONTINUED | COMMUNITY
Start: 2020-04-17 | End: 2020-05-20

## 2020-05-20 RX ORDER — BENZONATATE 100 MG/1
100 CAPSULE ORAL 3 TIMES DAILY
Qty: 30 | Refills: 0 | Status: DISCONTINUED | COMMUNITY
Start: 2020-04-17 | End: 2020-05-20

## 2020-05-20 NOTE — PHYSICAL EXAM
[No Acute Distress] : no acute distress [Well Developed] : well developed [Well Nourished] : well nourished [Well-Appearing] : well-appearing [Normal Sclera/Conjunctiva] : normal sclera/conjunctiva [PERRL] : pupils equal round and reactive to light [EOMI] : extraocular movements intact [Normal Outer Ear/Nose] : the outer ears and nose were normal in appearance [Normal Oropharynx] : the oropharynx was normal [No JVD] : no jugular venous distention [No Lymphadenopathy] : no lymphadenopathy [Supple] : supple [Thyroid Normal, No Nodules] : the thyroid was normal and there were no nodules present [No Respiratory Distress] : no respiratory distress  [Clear to Auscultation] : lungs were clear to auscultation bilaterally [No Accessory Muscle Use] : no accessory muscle use [Normal Rate] : normal rate  [Regular Rhythm] : with a regular rhythm [Normal S1, S2] : normal S1 and S2 [No Murmur] : no murmur heard [No Carotid Bruits] : no carotid bruits [No Varicosities] : no varicosities [No Abdominal Bruit] : a ~M bruit was not heard ~T in the abdomen [Pedal Pulses Present] : the pedal pulses are present [No Edema] : there was no peripheral edema [No Extremity Clubbing/Cyanosis] : no extremity clubbing/cyanosis [No Palpable Aorta] : no palpable aorta [Non Tender] : non-tender [Soft] : abdomen soft [Non-distended] : non-distended [No Masses] : no abdominal mass palpated [No HSM] : no HSM [Normal Bowel Sounds] : normal bowel sounds [Normal Posterior Cervical Nodes] : no posterior cervical lymphadenopathy [Normal Anterior Cervical Nodes] : no anterior cervical lymphadenopathy [No CVA Tenderness] : no CVA  tenderness [No Spinal Tenderness] : no spinal tenderness [No Joint Swelling] : no joint swelling [Grossly Normal Strength/Tone] : grossly normal strength/tone [No Rash] : no rash [Coordination Grossly Intact] : coordination grossly intact [No Focal Deficits] : no focal deficits [Normal Gait] : normal gait [Deep Tendon Reflexes (DTR)] : deep tendon reflexes were 2+ and symmetric [Normal Affect] : the affect was normal [Normal Insight/Judgement] : insight and judgment were intact

## 2020-05-20 NOTE — PLAN
[FreeTextEntry1] : This progress presents for a Tylenol followup evaluation. She will continue her current medication regimen. She did have her infusion ofSimponi for her rheumatoid arthritis. She will monitor her temperatures. The patient is positive for COVID-19.

## 2020-05-20 NOTE — HISTORY OF PRESENT ILLNESS
[Home] : at home, [unfilled] , at the time of the visit. [Medical Office: ([facility])___] : at the medical office located in  [Verbal consent obtained from patient] : the patient, [unfilled] [FreeTextEntry1] : Followup [de-identified] : Ms. Vidal presents for a telemedicine followup evaluation. She has a history of parvovirus infection. Patient says a history of rheumatoid arthritis. Yesterday, she received an infusion of her Simponi Aria despite being positive for COVID-19. She is feeling some fatigue and flushing today but is afebrile. She has mild shortness of breath with exertion.

## 2020-06-11 NOTE — ED PROVIDER NOTE - OBJECTIVE STATEMENT
Anticoagulation Clinic Progress Note    Anticoagulation Summary  As of 2020    INR goal:   2.0-3.0   TTR:   99.6 % (1.5 y)   INR used for dosin.55 (6/10/2020)   Warfarin maintenance plan:   4 mg every Sun, Tue, Thu; 2 mg all other days   Weekly warfarin total:   20 mg   Plan last modified:   Judie Barragan [facility] (2018)   Next INR check:   2020   Priority:   Maintenance   Target end date:       Indications    Atrial fibrillation with RVR (CMS/HCC) (Resolved) [I48.91]             Anticoagulation Episode Summary     INR check location:       Preferred lab:       Send INR reminders to:   [facility] [email or web address]  POOL    Comments:   [facility]      Anticoagulation Care Providers     Provider Role Specialty Phone number    Sly Saleh MD Referring Cardiology [phone]          Clinic Interview:      INR History:  Anticoagulation Monitoring 3/6/2020 2020 2020   INR 2.01 2.69 2.55   INR Date 3/5/2020 2020 6/10/2020   INR Goal 2.0-3.0 2.0-3.0 2.0-3.0   Trend Same Same Same   Last Week Total 20 mg 20 mg 20 mg   Next Week Total 20 mg 20 mg 20 mg   Sun 4 mg 4 mg 4 mg   Mon 2 mg 2 mg 2 mg   Tue 4 mg 4 mg 4 mg   Wed 2 mg 2 mg 2 mg   Thu 4 mg 4 mg 4 mg   Fri 2 mg 2 mg 2 mg   Sat 2 mg 2 mg 2 mg   Visit Report - - -   Some recent data might be hidden       Plan:  1. INR is Therapeutic - see above in Anticoagulation Summary.   Will instruct Arelis Johnson to Continue their warfarin regimen- see above in Anticoagulation Summary.  2. Follow up in 6 weeks  3. Spoke with pt today. They have been instructed to call if any changes in medications, doses, concerns, etc. Patient expresses understanding and has no further questions at this time.    Annita Mccormack [facility]  
29 yo F, nonsmoker with PMH of asthma (never intubated), RA (on infusion), anemia, presents to ER c/o headache x2 weeks, cough, sob since last week. Pt states she was tested positive for COVID19, dry cough came back, sob when she stands. Reports she haven't been eating for a week, no appetite. Admits having nausea, vomiting x4 days, diarrhea x5 days (resolved for the past 3 days), denies any abdominal pain. States having frontal headache, taking Tylenol w/o improvement, no head injury/trauma, not on anticoagulation. Admits she got sick from her mother at home. Denies any abdominal pain, dysuria, back pain, vaginal bleeding, leg swelling, hx of DVT/PE, recent travel or any other complaints.

## 2020-06-18 PROBLEM — U07.1 COVID-19: Status: ACTIVE | Noted: 2020-05-04

## 2020-06-24 ENCOUNTER — OUTPATIENT (OUTPATIENT)
Dept: OUTPATIENT SERVICES | Facility: HOSPITAL | Age: 30
LOS: 1 days | End: 2020-06-24
Payer: MEDICAID

## 2020-06-24 ENCOUNTER — APPOINTMENT (OUTPATIENT)
Dept: RHEUMATOLOGY | Facility: CLINIC | Age: 30
End: 2020-06-24
Payer: COMMERCIAL

## 2020-06-24 ENCOUNTER — APPOINTMENT (OUTPATIENT)
Dept: RADIOLOGY | Facility: CLINIC | Age: 30
End: 2020-06-24
Payer: MEDICAID

## 2020-06-24 ENCOUNTER — APPOINTMENT (OUTPATIENT)
Dept: PAIN MANAGEMENT | Facility: CLINIC | Age: 30
End: 2020-06-24
Payer: MEDICAID

## 2020-06-24 VITALS
OXYGEN SATURATION: 99 % | HEIGHT: 67 IN | TEMPERATURE: 98.2 F | DIASTOLIC BLOOD PRESSURE: 76 MMHG | RESPIRATION RATE: 16 BRPM | HEART RATE: 86 BPM | SYSTOLIC BLOOD PRESSURE: 113 MMHG | BODY MASS INDEX: 33.59 KG/M2 | WEIGHT: 214 LBS

## 2020-06-24 VITALS — TEMPERATURE: 98.3 F

## 2020-06-24 DIAGNOSIS — Z33.2 ENCOUNTER FOR ELECTIVE TERMINATION OF PREGNANCY: Chronic | ICD-10-CM

## 2020-06-24 DIAGNOSIS — E66.9 OBESITY, UNSPECIFIED: ICD-10-CM

## 2020-06-24 DIAGNOSIS — G43.709 CHRONIC MIGRAINE W/OUT AURA, NOT INTRACTABLE, W/OUT STATUS MIGRAINOSUS: ICD-10-CM

## 2020-06-24 DIAGNOSIS — M54.5 LOW BACK PAIN: ICD-10-CM

## 2020-06-24 DIAGNOSIS — M54.2 CERVICALGIA: ICD-10-CM

## 2020-06-24 DIAGNOSIS — Z86.69 PERSONAL HISTORY OF OTHER DISEASES OF THE NERVOUS SYSTEM AND SENSE ORGANS: ICD-10-CM

## 2020-06-24 DIAGNOSIS — Z98.89 OTHER SPECIFIED POSTPROCEDURAL STATES: Chronic | ICD-10-CM

## 2020-06-24 DIAGNOSIS — G89.29 LOW BACK PAIN: ICD-10-CM

## 2020-06-24 PROCEDURE — 99214 OFFICE O/P EST MOD 30 MIN: CPT

## 2020-06-24 PROCEDURE — 72100 X-RAY EXAM L-S SPINE 2/3 VWS: CPT | Mod: 26

## 2020-06-24 PROCEDURE — 72100 X-RAY EXAM L-S SPINE 2/3 VWS: CPT

## 2020-06-24 RX ORDER — ALPRAZOLAM 0.5 MG/1
0.5 TABLET ORAL
Qty: 40 | Refills: 0 | Status: ACTIVE | COMMUNITY
Start: 2020-03-17 | End: 1900-01-01

## 2020-06-24 RX ORDER — METHOCARBAMOL 500 MG/1
500 TABLET, FILM COATED ORAL 3 TIMES DAILY
Qty: 30 | Refills: 2 | Status: ACTIVE | COMMUNITY
Start: 2020-06-24 | End: 1900-01-01

## 2020-06-24 RX ORDER — RANITIDINE HYDROCHLORIDE 300 MG/1
300 CAPSULE ORAL
Qty: 90 | Refills: 2 | Status: DISCONTINUED | COMMUNITY
Start: 2018-07-17 | End: 2020-06-24

## 2020-06-24 RX ORDER — KETOROLAC TROMETHAMINE 10 MG/1
10 TABLET, FILM COATED ORAL
Qty: 12 | Refills: 0 | Status: DISCONTINUED | COMMUNITY
Start: 2020-02-28 | End: 2020-06-24

## 2020-06-24 RX ORDER — HYDROXYCHLOROQUINE SULFATE 200 MG/1
200 TABLET, FILM COATED ORAL
Qty: 60 | Refills: 3 | Status: DISCONTINUED | COMMUNITY
Start: 2019-07-23 | End: 2020-06-24

## 2020-06-24 RX ORDER — COLCHICINE 0.6 MG/1
0.6 TABLET ORAL DAILY
Qty: 30 | Refills: 1 | Status: DISCONTINUED | COMMUNITY
Start: 2019-07-30 | End: 2020-06-24

## 2020-06-24 NOTE — HISTORY OF PRESENT ILLNESS
[___ Month(s) Ago] : [unfilled] month(s) ago [FreeTextEntry1] : s/p covid - recovered\par reports ongoing easy bruising over arms and legs and "red dots"  - not painful\par Raynaud's are active\par episode of severe low back pain after the infusion for about 1 week - bed bound -  with difficulty walking - hip pain. - this happened in may and patient has tried ibuprofen 800 mg and cyclobenzaprine with no improvement. \par labs x-rays done in 2017 with mild degenerative changes\par no joint pain

## 2020-06-24 NOTE — HISTORY OF PRESENT ILLNESS
[Chronic Headache] : chronic headache [Nausea] : nausea [FreeTextEntry1] : Pt notes that she had COVID in APril with different type of headache with dull frontal pressure.  NOtes she was hallucinating and dehydrated with injections and headaches persisted about 2 weeks after the other symptoms that were 2 1/2 weeks.\par The migraines then returned which was similar to previous with return to migraine event.\par Has increased the paxil to 20mg without ill side effects.\par Doesn't feel "different" with paxil but has had increase in panic attacks.\par .   [Photophobia] : photophobia [Phonophobia] : phonophobia [Scalp Tenderness] : scalp tenderness [4] : a minimum pain level of 4/10 [> 4 hours] : > 4 hours [stayed the same] : stayed the same [7] : a maximum pain level of 7/10 [Stable] : The patient reports ~his/her~ symptoms since the last visit are stable

## 2020-06-24 NOTE — ASSESSMENT
[FreeTextEntry1] : 28 year-old female with \par \par 1. Positive MIRTHA, polyarthritis, lymphadenopathy, on Plaquenil and Orencia  now on Simponi - controlled\par 2. thyroid nodule - bx was normal.\par 3. Pleuritic chest pain -resolved\par 4. Normal bone density - continue with calcium and Vitamin D\par 5. Gi bloating and constipation -pending GI appt\par 6. cervical lymphadenopathy - under observations ,previous n biopsy x4 - none demonstrated lymphoproliferative disease\par 7. hepatomegaly mild  recent Ct scan - stable\par 8. Hyper flexible joints over hands\par 9.  Left knee patellar dislocation -  no new episodes\par 10. MRSA in the past - no new episodes\par 11. left hip muscle/tendon tear - improved - \par 12- Recent work related accident affecting the right foot - being managed by ortho\par 13. Lumbar pain - repeat x-rays - ongoing tenderness over paraspinal muscle - start muscle relaxers if no improvement will send for MRI - has tried over  6 weeks of ibuprofen and cyclobenzaprine\par \par \par I reviewed previous labs results with patients.\par Laboratory tests ordered today\par Diagnosis and Prognosis discussed\par Continue with current medications\par education provided on  exercise and diet\par F/u  3 months\par \par \par \par

## 2020-06-24 NOTE — PHYSICAL EXAM
[General Appearance - Alert] : alert [General Appearance - In No Acute Distress] : in no acute distress [Auscultation Breath Sounds / Voice Sounds] : lungs were clear to auscultation bilaterally [Heart Rate And Rhythm] : heart rate was normal and rhythm regular [Heart Sounds] : normal S1 and S2 [Murmurs] : no murmurs [Heart Sounds Gallop] : no gallops [Heart Sounds Pericardial Friction Rub] : no pericardial rub [Full Pulse] : the pedal pulses are present [Edema] : there was no peripheral edema [Bowel Sounds] : normal bowel sounds [Abdomen Soft] : soft [Abdomen Mass (___ Cm)] : no abdominal mass palpated [Cervical Lymph Nodes Enlarged Posterior Bilaterally] : posterior cervical [Supraclavicular Lymph Nodes Enlarged Bilaterally] : supraclavicular [Cervical Lymph Nodes Enlarged Anterior Bilaterally] : anterior cervical [Skin Color & Pigmentation] : normal skin color and pigmentation [Skin Turgor] : normal skin turgor [] : no rash [Oriented To Time, Place, And Person] : oriented to person, place, and time [Impaired Insight] : insight and judgment were intact [Affect] : the affect was normal [FreeTextEntry1] : no swollen or tender  joints:

## 2020-06-24 NOTE — REVIEW OF SYSTEMS
[Joint Pain] : joint pain [As Noted in HPI] : as noted in HPI [Skin Lesions] : skin lesion [Negative] : Heme/Lymph [FreeTextEntry9] : back pain

## 2020-06-24 NOTE — PHYSICAL EXAM
[General Appearance - Alert] : alert [General Appearance - In No Acute Distress] : in no acute distress [General Appearance - Well Nourished] : well nourished [General Appearance - Well-Appearing] : healthy appearing [Oriented To Time, Place, And Person] : oriented to person, place, and time [General Appearance - Well Developed] : well developed [Impaired Insight] : insight and judgment were intact [Memory Recent] : recent memory was not impaired [Affect] : the affect was normal [Memory Remote] : remote memory was not impaired [Place] : oriented to place [Time] : oriented to time [Person] : oriented to person [Remote Intact] : remote memory intact [Short Term Intact] : short term memory intact [Registration Intact] : recent registration memory intact [Concentration Intact] : normal concentrating ability [Visual Intact] : visual attention was ~T not ~L decreased [Fluency] : fluency intact [Naming Objects] : no difficulty naming common objects [Comprehension] : comprehension intact [Past History] : adequate knowledge of personal past history [Current Events] : adequate knowledge of current events [Vocabulary] : adequate range of vocabulary [Cranial Nerves Oculomotor (III)] : extraocular motion intact [Cranial Nerves Trigeminal (V)] : facial sensation intact symmetrically [Cranial Nerves Facial (VII)] : face symmetrical [Cranial Nerves Glossopharyngeal (IX)] : tongue and palate midline [Cranial Nerves Accessory (XI - Cranial And Spinal)] : head turning and shoulder shrug symmetric [Motor Tone] : muscle tone was normal in all four extremities [Cranial Nerves Hypoglossal (XII)] : there was no tongue deviation with protrusion [Motor Strength] : muscle strength was normal in all four extremities [No Muscle Atrophy] : normal bulk in all four extremities [Motor Strength Upper Extremities Bilaterally] : strength was normal in both upper extremities [Motor Handedness Left-Handed] : the patient is left hand dominant [Sensation Tactile Decrease] : light touch was intact [Motor Strength Lower Extremities Bilaterally] : strength was normal in both lower extremities [Allodynia] : no ~T allodynia present [Limited Balance] : balance was intact [Balance] : balance was intact [Dysdiadochokinesia Bilaterally] : not present [Past-pointing] : there was no past-pointing [Tremor] : no tremor present [Coordination - Dysmetria Impaired Finger-to-Nose Bilateral] : not present [Sclera] : the sclera and conjunctiva were normal [Extraocular Movements] : extraocular movements were intact [PERRL With Normal Accommodation] : pupils were equal in size, round, reactive to light, with normal accommodation [Outer Ear] : the ears and nose were normal in appearance [Neck Cervical Mass (___cm)] : no neck mass was observed [Neck Appearance] : the appearance of the neck was normal [Abnormal Walk] : normal gait [Edema] : there was no peripheral edema [Exaggerated Use Of Accessory Muscles For Inspiration] : no accessory muscle use [Nail Clubbing] : no clubbing  or cyanosis of the fingernails [Involuntary Movements] : no involuntary movements were seen [Skin Color & Pigmentation] : normal skin color and pigmentation [] : no rash

## 2020-06-25 LAB
CRP SERPL-MCNC: 1.39 MG/DL
ERYTHROCYTE [SEDIMENTATION RATE] IN BLOOD BY WESTERGREN METHOD: 37 MM/HR
T3FREE SERPL-MCNC: 2.93 PG/ML
T4 FREE SERPL-MCNC: 0.8 NG/DL
TSH SERPL-ACNC: 3.88 UIU/ML

## 2020-07-01 ENCOUNTER — RESULT REVIEW (OUTPATIENT)
Age: 30
End: 2020-07-01

## 2020-07-01 ENCOUNTER — OUTPATIENT (OUTPATIENT)
Dept: OUTPATIENT SERVICES | Facility: HOSPITAL | Age: 30
LOS: 1 days | End: 2020-07-01
Payer: MEDICAID

## 2020-07-01 ENCOUNTER — LABORATORY RESULT (OUTPATIENT)
Age: 30
End: 2020-07-01

## 2020-07-01 ENCOUNTER — APPOINTMENT (OUTPATIENT)
Dept: OBGYN | Facility: CLINIC | Age: 30
End: 2020-07-01
Payer: MEDICAID

## 2020-07-01 VITALS — BODY MASS INDEX: 33.52 KG/M2 | WEIGHT: 214 LBS | SYSTOLIC BLOOD PRESSURE: 110 MMHG | DIASTOLIC BLOOD PRESSURE: 70 MMHG

## 2020-07-01 DIAGNOSIS — Z33.2 ENCOUNTER FOR ELECTIVE TERMINATION OF PREGNANCY: Chronic | ICD-10-CM

## 2020-07-01 DIAGNOSIS — Z00.00 ENCOUNTER FOR GENERAL ADULT MEDICAL EXAMINATION W/OUT ABNORMAL FINDINGS: ICD-10-CM

## 2020-07-01 DIAGNOSIS — N64.4 MASTODYNIA: ICD-10-CM

## 2020-07-01 DIAGNOSIS — Z98.89 OTHER SPECIFIED POSTPROCEDURAL STATES: Chronic | ICD-10-CM

## 2020-07-01 PROCEDURE — 86780 TREPONEMA PALLIDUM: CPT

## 2020-07-01 PROCEDURE — 99213 OFFICE O/P EST LOW 20 MIN: CPT | Mod: GC

## 2020-07-01 PROCEDURE — G0463: CPT

## 2020-07-01 PROCEDURE — 86803 HEPATITIS C AB TEST: CPT

## 2020-07-01 PROCEDURE — 87491 CHLMYD TRACH DNA AMP PROBE: CPT

## 2020-07-01 PROCEDURE — 87591 N.GONORRHOEAE DNA AMP PROB: CPT

## 2020-07-01 PROCEDURE — 87340 HEPATITIS B SURFACE AG IA: CPT

## 2020-07-01 PROCEDURE — 87389 HIV-1 AG W/HIV-1&-2 AB AG IA: CPT

## 2020-07-02 LAB
HBV SURFACE AG SER-ACNC: SIGNIFICANT CHANGE UP
HCV AB S/CO SERPL IA: 0.18 S/CO — SIGNIFICANT CHANGE UP (ref 0–0.99)
HCV AB SERPL-IMP: SIGNIFICANT CHANGE UP
HIV 1+2 AB+HIV1 P24 AG SERPL QL IA: SIGNIFICANT CHANGE UP
T PALLIDUM AB TITR SER: NEGATIVE — SIGNIFICANT CHANGE UP

## 2020-07-02 NOTE — END OF VISIT
[FreeTextEntry3] : Case discussed with resident, agree with management plan as above.\par \par Eleanor Dee MD\par  [] : Resident

## 2020-07-02 NOTE — PHYSICAL EXAM
[Awake] : awake [Alert] : alert [Examination Of The Breasts] : a normal appearance [No Masses] : no breast masses were palpable [Soft] : soft [Oriented x3] : oriented to person, place, and time [No Lesions] : no genitalia lesions [Normal] : uterus [Labia Majora] : labia major [Labia Minora] : labia minora [No Bleeding] : there was no active vaginal bleeding [IUD String] : had an IUD string protruding out [Pap Obtained] : a Pap smear was performed [Normal Position] : in a normal position [Uterine Adnexae] : were not tender and not enlarged [Acute Distress] : no acute distress [Nipple Discharge] : no nipple discharge [Mass] : no breast mass [Tender] : non tender [Axillary LAD] : no axillary lymphadenopathy [Axillary Lymph Nodes Enlarged Bilaterally] : no enlarged nodes [Depressed Mood] : not depressed [Distended] : not distended [Labia Majora Erythema] : no erythema of the labia majora [Labia Minora Erythema] : no erythema of the labia minora [Flat Affect] : affect not flat [Tenderness] : nontender [Motion Tenderness] : there was no cervical motion tenderness [FreeTextEntry4] : Stage 1 anterior and posterior vaginal prolapse; Minimal apical descent [FreeTextEntry6] : mobile uterus, adnexa nonpalpable b/l [Enlarged ___ wks] : not enlarged [FreeTextEntry9] : weakness on anterior rectal wall

## 2020-07-03 LAB
C TRACH RRNA SPEC QL NAA+PROBE: SIGNIFICANT CHANGE UP
N GONORRHOEA RRNA SPEC QL NAA+PROBE: SIGNIFICANT CHANGE UP
SPECIMEN SOURCE: SIGNIFICANT CHANGE UP

## 2020-07-07 ENCOUNTER — RESULT REVIEW (OUTPATIENT)
Age: 30
End: 2020-07-07

## 2020-07-07 ENCOUNTER — APPOINTMENT (OUTPATIENT)
Dept: ULTRASOUND IMAGING | Facility: CLINIC | Age: 30
End: 2020-07-07
Payer: MEDICAID

## 2020-07-07 ENCOUNTER — OUTPATIENT (OUTPATIENT)
Dept: OUTPATIENT SERVICES | Facility: HOSPITAL | Age: 30
LOS: 1 days | End: 2020-07-07
Payer: MEDICAID

## 2020-07-07 ENCOUNTER — APPOINTMENT (OUTPATIENT)
Dept: MAMMOGRAPHY | Facility: CLINIC | Age: 30
End: 2020-07-07
Payer: MEDICAID

## 2020-07-07 DIAGNOSIS — N81.9 FEMALE GENITAL PROLAPSE, UNSPECIFIED: ICD-10-CM

## 2020-07-07 DIAGNOSIS — N64.4 MASTODYNIA: ICD-10-CM

## 2020-07-07 DIAGNOSIS — Z00.8 ENCOUNTER FOR OTHER GENERAL EXAMINATION: ICD-10-CM

## 2020-07-07 DIAGNOSIS — Z98.89 OTHER SPECIFIED POSTPROCEDURAL STATES: Chronic | ICD-10-CM

## 2020-07-07 DIAGNOSIS — Z33.2 ENCOUNTER FOR ELECTIVE TERMINATION OF PREGNANCY: Chronic | ICD-10-CM

## 2020-07-07 PROCEDURE — 77066 DX MAMMO INCL CAD BI: CPT | Mod: 26

## 2020-07-07 PROCEDURE — 77066 DX MAMMO INCL CAD BI: CPT

## 2020-07-07 PROCEDURE — G0279: CPT | Mod: 26

## 2020-07-07 PROCEDURE — 76642 ULTRASOUND BREAST LIMITED: CPT | Mod: 26,RT

## 2020-07-07 PROCEDURE — 76642 ULTRASOUND BREAST LIMITED: CPT

## 2020-07-07 PROCEDURE — G0279: CPT

## 2020-07-08 ENCOUNTER — APPOINTMENT (OUTPATIENT)
Dept: UROGYNECOLOGY | Facility: CLINIC | Age: 30
End: 2020-07-08
Payer: MEDICAID

## 2020-07-08 VITALS
DIASTOLIC BLOOD PRESSURE: 79 MMHG | TEMPERATURE: 98.8 F | BODY MASS INDEX: 32.96 KG/M2 | HEART RATE: 82 BPM | WEIGHT: 210 LBS | SYSTOLIC BLOOD PRESSURE: 111 MMHG | HEIGHT: 67 IN | OXYGEN SATURATION: 97 %

## 2020-07-08 PROCEDURE — 99203 OFFICE O/P NEW LOW 30 MIN: CPT

## 2020-07-08 PROCEDURE — 99213 OFFICE O/P EST LOW 20 MIN: CPT

## 2020-07-08 NOTE — REASON FOR VISIT
[Initial Visit ___] : an initial visit for [unfilled] [Pelvic Organ Prolapse] : pelvic organ prolapse [Questionnaire Received] : Patient questionnaire received [Intake Form Reviewed] : Patient intake form with past medical history, surgical history, family history and social history reviewed today

## 2020-07-08 NOTE — PHYSICAL EXAM
[Chaperone Present] : A chaperone was present in the examining room during all aspects of the physical examination [No Acute Distress] : in no acute distress [Oriented x3] : oriented to person, place, and time [Respirations regular] : ~T respiratory rate was regular [No Edema] : ~T edema was not present [Warm and Dry] : was warm and dry to touch [Normal Gait] : gait was normal [Labia Majora] : were normal [Labia Minora] : were normal [Normal Appearance] : general appearance was normal [Pink Rugae] : pink rugae [Normal] : normal [Normal rectal exam] : was normal [No Bleeding] : there was no active vaginal bleeding [3] : 3 [Uterine Adnexae] : were not tender and not enlarged [Tenderness] : ~T no ~M abdominal tenderness observed [Distended] : not distended [FreeTextEntry4] : No rectocele identified vaginally

## 2020-07-08 NOTE — HISTORY OF PRESENT ILLNESS
[Cystocele (Obstetric)] : no [Vaginal Wall Prolapse] : no [Unable To Restrain Bowel Movement] : no [Rectal Prolapse] : no [Feelings Of Urinary Urgency] : no [Constipation Obstructed Defecation] : mild [x1] : nocturia once nightly [Diarrhea] : moderate [Stool Visible Blood] : severe [] : mild [de-identified] : Mostly when she is otherwise distracted [FreeTextEntry6] : Unclear given current symptoms, doesn’t take medication [de-identified] : 5-6x a day with water pill.  [de-identified] : x1 year [FreeTextEntry7] : x1year, states she has to move perineum out of the way, getting worse [de-identified] : Occasionally [de-identified] : x1 year

## 2020-07-08 NOTE — DISCUSSION/SUMMARY
[FreeTextEntry1] : 31yo P2 presenting with defecatory dysfunction with associated digitation and rectal bleeding over the past year, likely secondary to constipation. Discussed with patient benign physical exam findings. Counseled patient to try Miralax or other stool softeners to assist with defecation. Also referred patient to colorectal surgery for further evaluation. Patient states she already has an appointment scheduled with colo-rectal tomorrow. IUGA information on constipation provided to patient. All questions answered.

## 2020-07-09 ENCOUNTER — APPOINTMENT (OUTPATIENT)
Dept: COLORECTAL SURGERY | Facility: CLINIC | Age: 30
End: 2020-07-09
Payer: MEDICAID

## 2020-07-09 VITALS
SYSTOLIC BLOOD PRESSURE: 117 MMHG | BODY MASS INDEX: 32.96 KG/M2 | HEIGHT: 67 IN | HEART RATE: 102 BPM | WEIGHT: 210 LBS | TEMPERATURE: 99.1 F | DIASTOLIC BLOOD PRESSURE: 79 MMHG

## 2020-07-09 DIAGNOSIS — K59.00 CONSTIPATION, UNSPECIFIED: ICD-10-CM

## 2020-07-09 DIAGNOSIS — N81.9 FEMALE GENITAL PROLAPSE, UNSPECIFIED: ICD-10-CM

## 2020-07-09 PROCEDURE — 46600 DIAGNOSTIC ANOSCOPY SPX: CPT

## 2020-07-09 PROCEDURE — 99203 OFFICE O/P NEW LOW 30 MIN: CPT | Mod: 25

## 2020-07-09 NOTE — PHYSICAL EXAM
[Normal rectal exam] : exam was normal [Reduce Spontaneously] : a spontaneously reducible (grade II) [Normal] : was normal [None] : there was no rectal mass  [Alert] : alert [No Rash or Lesion] : No rash or lesion [Oriented to Person] : oriented to person [Oriented to Place] : oriented to place [Oriented to Time] : oriented to time [Calm] : calm [Skin Tags] : there were no residual hemorrhoidal skin tags seen [Stool Sample Taken] : no stool obtained on rectal exam [Gross Blood] : no gross blood [Fecal Impaction] : no fecal impaction was present [de-identified] : No apparent distress [de-identified] : Normocephalic atraumatic [de-identified] : Moving all extremities x4

## 2020-07-09 NOTE — CONSULT LETTER
[Dear  ___] : Dear  [unfilled], [Consult Letter:] : I had the pleasure of evaluating your patient, [unfilled]. [Please see my note below.] : Please see my note below. [Sincerely,] : Sincerely, [Consult Closing:] : Thank you very much for allowing me to participate in the care of this patient.  If you have any questions, please do not hesitate to contact me. [FreeTextEntry3] : Catie Mcclendon MD\par

## 2020-07-09 NOTE — HISTORY OF PRESENT ILLNESS
[FreeTextEntry1] : Aisha presents to the office for consultation with reports of difficulty evacuating her stools unless she applies pressure to her perineum.  She states that this began shortly after the birth of her 3 children, especially after the second pregnancy in which she delivered twins.  She notes that she often strains so hard that she has resultant rectal bleeding but denies any routine rectal bleeding not associated with stools or straining.  She does admit that when she drinks coffee, she is able to have a bowel movement on a daily basis.  Without coffee, she returns to her baseline constipation.  In June 2016, she underwent a screening colonoscopy, interestingly for diarrhea, not constipation, with biopsies taken that were negative for microscopic colitis.

## 2020-07-09 NOTE — PROCEDURE
[FreeTextEntry1] : Anoscopy performed after pt consent was obtained. Circumferential visualization of the anal canal  above the level of the dentate line was completed.\par

## 2020-07-09 NOTE — ASSESSMENT
[FreeTextEntry1] : Aisha presents to the office with reports of obstructed defecation.  Anorectal exam in office today, including RADHA and anoscopy, were unremarkable.  We discussed undergoing an MRI defecography to get a better sense of the cause of this obstructed defecation, whether it is secondary to nonrelaxing pelvic floor or to pelvic organ prolapse or to simple inadequate dietary fiber intake.  She was agreeable to proceed with this study and the order was placed.  In the interim, I have advised her to adhere to a high-fiber diet with ample water intake as well as MiraLAX nightly.  A handout on fiber was provided.  Finally, pelvic floor PT may be in order to help with these issues of pelvic floor dysfunction.  A [facility] referral was placed in all scripts.  We will contact her after results of MRI defecography have been completed.  She understands, and is agreeable with plan of care.

## 2020-07-10 ENCOUNTER — APPOINTMENT (OUTPATIENT)
Dept: PAIN MANAGEMENT | Facility: CLINIC | Age: 30
End: 2020-07-10

## 2020-07-10 ENCOUNTER — EMERGENCY (EMERGENCY)
Facility: HOSPITAL | Age: 30
LOS: 0 days | Discharge: HOME | End: 2020-07-10
Attending: EMERGENCY MEDICINE
Payer: MEDICAID

## 2020-07-10 VITALS
HEART RATE: 80 BPM | TEMPERATURE: 99 F | RESPIRATION RATE: 18 BRPM | SYSTOLIC BLOOD PRESSURE: 118 MMHG | OXYGEN SATURATION: 98 % | DIASTOLIC BLOOD PRESSURE: 72 MMHG

## 2020-07-10 VITALS — HEIGHT: 67 IN | WEIGHT: 210.1 LBS

## 2020-07-10 DIAGNOSIS — J43.9 EMPHYSEMA, UNSPECIFIED: ICD-10-CM

## 2020-07-10 DIAGNOSIS — Z98.89 OTHER SPECIFIED POSTPROCEDURAL STATES: Chronic | ICD-10-CM

## 2020-07-10 DIAGNOSIS — M32.9 SYSTEMIC LUPUS ERYTHEMATOSUS, UNSPECIFIED: ICD-10-CM

## 2020-07-10 DIAGNOSIS — E55.9 VITAMIN D DEFICIENCY, UNSPECIFIED: ICD-10-CM

## 2020-07-10 DIAGNOSIS — D64.9 ANEMIA, UNSPECIFIED: ICD-10-CM

## 2020-07-10 DIAGNOSIS — R11.2 NAUSEA WITH VOMITING, UNSPECIFIED: ICD-10-CM

## 2020-07-10 DIAGNOSIS — M06.9 RHEUMATOID ARTHRITIS, UNSPECIFIED: ICD-10-CM

## 2020-07-10 DIAGNOSIS — Z91.030 BEE ALLERGY STATUS: ICD-10-CM

## 2020-07-10 DIAGNOSIS — Z23 ENCOUNTER FOR IMMUNIZATION: ICD-10-CM

## 2020-07-10 DIAGNOSIS — Z33.2 ENCOUNTER FOR ELECTIVE TERMINATION OF PREGNANCY: Chronic | ICD-10-CM

## 2020-07-10 DIAGNOSIS — Z88.5 ALLERGY STATUS TO NARCOTIC AGENT: ICD-10-CM

## 2020-07-10 DIAGNOSIS — G43.909 MIGRAINE, UNSPECIFIED, NOT INTRACTABLE, WITHOUT STATUS MIGRAINOSUS: ICD-10-CM

## 2020-07-10 DIAGNOSIS — R51 HEADACHE: ICD-10-CM

## 2020-07-10 DIAGNOSIS — Z88.0 ALLERGY STATUS TO PENICILLIN: ICD-10-CM

## 2020-07-10 DIAGNOSIS — J45.909 UNSPECIFIED ASTHMA, UNCOMPLICATED: ICD-10-CM

## 2020-07-10 PROCEDURE — 99284 EMERGENCY DEPT VISIT MOD MDM: CPT

## 2020-07-10 PROCEDURE — 99284 EMERGENCY DEPT VISIT MOD MDM: CPT | Mod: 25

## 2020-07-10 PROCEDURE — 96375 TX/PRO/DX INJ NEW DRUG ADDON: CPT

## 2020-07-10 PROCEDURE — 96374 THER/PROPH/DIAG INJ IV PUSH: CPT

## 2020-07-10 PROCEDURE — 96372 THER/PROPH/DIAG INJ SC/IM: CPT | Mod: XU

## 2020-07-10 RX ORDER — KETOROLAC TROMETHAMINE 30 MG/ML
30 SYRINGE (ML) INJECTION ONCE
Refills: 0 | Status: DISCONTINUED | OUTPATIENT
Start: 2020-07-10 | End: 2020-07-10

## 2020-07-10 RX ORDER — TOPIRAMATE 25 MG/1
25 TABLET, FILM COATED ORAL
Qty: 120 | Refills: 2 | Status: ACTIVE | COMMUNITY
Start: 2017-06-29 | End: 1900-01-01

## 2020-07-10 RX ORDER — DIPHENHYDRAMINE HCL 50 MG
25 CAPSULE ORAL ONCE
Refills: 0 | Status: COMPLETED | OUTPATIENT
Start: 2020-07-10 | End: 2020-07-10

## 2020-07-10 RX ORDER — SODIUM CHLORIDE 9 MG/ML
1000 INJECTION INTRAMUSCULAR; INTRAVENOUS; SUBCUTANEOUS ONCE
Refills: 0 | Status: COMPLETED | OUTPATIENT
Start: 2020-07-10 | End: 2020-07-10

## 2020-07-10 RX ORDER — METOCLOPRAMIDE HCL 10 MG
10 TABLET ORAL ONCE
Refills: 0 | Status: COMPLETED | OUTPATIENT
Start: 2020-07-10 | End: 2020-07-10

## 2020-07-10 RX ADMIN — SODIUM CHLORIDE 1000 MILLILITER(S): 9 INJECTION INTRAMUSCULAR; INTRAVENOUS; SUBCUTANEOUS at 20:23

## 2020-07-10 RX ADMIN — Medication 10 MILLIGRAM(S): at 20:57

## 2020-07-10 RX ADMIN — Medication 25 MILLIGRAM(S): at 20:57

## 2020-07-10 RX ADMIN — Medication 30 MILLIGRAM(S): at 20:57

## 2020-07-10 NOTE — ED ADULT NURSE NOTE - OBJECTIVE STATEMENT
Pt c/o migraine, nausea, and vomiting x36 hours.  Pt took Excedrin migraine today without relief. Pt states headache is on left side of head. Pt alert and oriented x4, able to ambulate without assistance

## 2020-07-10 NOTE — ED STATDOCS - ATTENDING CONTRIBUTION TO CARE
I, Enedelia Khan MD, personally saw the patient with ACP.  I have personally performed a face to face diagnostic evaluation on this patient.  I have reviewed the ACP note and agree with the history, exam, and plan of care, except as noted.

## 2020-07-10 NOTE — ED STATDOCS - PROGRESS NOTE DETAILS
Patient seen s/p medications.  She is feeling better and would like to go home.  Reviewed return precautions and follow up with neurologist -Tameka Garcia PA-C

## 2020-07-10 NOTE — ED STATDOCS - PATIENT PORTAL LINK FT
You can access the FollowMyHealth Patient Portal offered by [facility] by registering at the following website: [email or web address]. By joining [employer]’s FollowMyHealth portal, you will also be able to view your health information using other applications (apps) compatible with our system.

## 2020-07-10 NOTE — ED STATDOCS - OBJECTIVE STATEMENT
31 y/o female with a PMHx of Lupus, asthma. rheumatoid arthritis, migraine, C. diff, emphysema, anemia, vitamin D deficiency, seronegative arthritis, presents to the ED c/o migraine, nausea, and vomiting x36 hours.  Pt took Excedrin migraine today without relief. Pt was supposed to see her neurologist today but was unable to do so because she was unable to drive secondary to migraine. Pt notes sensitivity to light. Pt reports migraine feels like typical migraines. Pt states she has an IUD and does not get menstrual period. No other complaints at this time. Allergies: Demerol HCl, morphine. INtolerances: Penicillin. PCP: Garth Ordaz.

## 2020-07-10 NOTE — ED ADULT TRIAGE NOTE - CHIEF COMPLAINT QUOTE
severe migraine pain x36 hours with many episodes of vomiting. has been unable to eat or drink due to vomiting, reports only urinating once today.   history of migraines, started taking topamax again, triptan, and excedrine migraine.

## 2020-07-10 NOTE — ED STATDOCS - CLINICAL SUMMARY MEDICAL DECISION MAKING FREE TEXT BOX
31 y/o female with Hx of migraines presents with a migraine. Pt states she tried Excedrin, has neurologist. Will medicate, reassess, and re eval pt. I do not think pt needs CT at this time as this is her typical migraine.

## 2020-07-10 NOTE — ED ADULT NURSE NOTE - NSIMPLEMENTINTERV_GEN_ALL_ED
Implemented All Universal Safety Interventions:  [place] to call system. Call bell, personal items and telephone within reach. Instruct patient to call for assistance. Room bathroom lighting operational. Non-slip footwear when patient is off stretcher. Physically safe environment: no spills, clutter or unnecessary equipment. Stretcher in lowest position, wheels locked, appropriate side rails in place.

## 2020-07-14 ENCOUNTER — LABORATORY RESULT (OUTPATIENT)
Age: 30
End: 2020-07-14

## 2020-07-15 ENCOUNTER — OUTPATIENT (OUTPATIENT)
Dept: OUTPATIENT SERVICES | Facility: HOSPITAL | Age: 30
LOS: 1 days | End: 2020-07-15
Payer: MEDICAID

## 2020-07-15 ENCOUNTER — APPOINTMENT (OUTPATIENT)
Dept: OBGYN | Facility: CLINIC | Age: 30
End: 2020-07-15
Payer: MEDICAID

## 2020-07-15 DIAGNOSIS — N76.0 ACUTE VAGINITIS: ICD-10-CM

## 2020-07-15 DIAGNOSIS — R87.810 CERVICAL HIGH RISK HUMAN PAPILLOMAVIRUS (HPV) DNA TEST POSITIVE: ICD-10-CM

## 2020-07-15 DIAGNOSIS — R87.610 ATYPICAL SQUAMOUS CELLS OF UNDETERMINED SIGNIFICANCE ON CYTOLOGIC SMEAR OF CERVIX (ASC-US): ICD-10-CM

## 2020-07-15 DIAGNOSIS — R87.612 LOW GRADE SQUAMOUS INTRAEPITHELIAL LESION ON CYTOLOGIC SMEAR OF CERVIX (LGSIL): ICD-10-CM

## 2020-07-15 DIAGNOSIS — Z33.2 ENCOUNTER FOR ELECTIVE TERMINATION OF PREGNANCY: Chronic | ICD-10-CM

## 2020-07-15 DIAGNOSIS — B97.7 PAPILLOMAVIRUS AS THE CAUSE OF DISEASES CLASSIFIED ELSEWHERE: ICD-10-CM

## 2020-07-15 DIAGNOSIS — R87.810 ATYPICAL SQUAMOUS CELLS OF UNDETERMINED SIGNIFICANCE ON CYTOLOGIC SMEAR OF CERVIX (ASC-US): ICD-10-CM

## 2020-07-15 DIAGNOSIS — Z98.89 OTHER SPECIFIED POSTPROCEDURAL STATES: Chronic | ICD-10-CM

## 2020-07-15 PROCEDURE — 57454 BX/CURETT OF CERVIX W/SCOPE: CPT

## 2020-07-15 PROCEDURE — 57454 BX/CURETT OF CERVIX W/SCOPE: CPT | Mod: GC

## 2020-07-15 NOTE — PROCEDURE
[Colposcopy] : colposcopy [HPV high risk] : PCR positive for high risk HPV [LGSIL] : low grade squamous intraepithelial lesion [Risks] : risks [Patient] : patient [Benefits] : benefits [Alternatives] : alternatives [Infection] : infection [Bleeding] : bleeding [Consent Obtained] : written consent was obtained prior to the procedure [SCJ Fully Visulized] : the squamocolumnar junction was fully visualized [Acetowhite ___ o'clock] : ascetowhite changes at [unfilled] ~Uo'clock [No Abnormalities] : no abnormalities seen [Biopsies Taken: # ___] : [unfilled] biopsies taken of the cervix [Biopsy Locations ___ o'clock] : the biopsies were taken at [unfilled] o'clock [John's] : John's solution [No Complications] : there were no complications [Tolerated Well] : the patient tolerated the procedure well [Pap Performed] : a cervical Pap smear was not performed [Punctation ___ o'clock] : no punctation [Mosaicism ___ o'clock] : no mosaicism [Atypical Vessels ___ o'clock] : no atypical vessels [ECC Done] : Endocervical curettage was not performed.

## 2020-07-17 ENCOUNTER — APPOINTMENT (OUTPATIENT)
Dept: PAIN MANAGEMENT | Facility: CLINIC | Age: 30
End: 2020-07-17
Payer: MEDICAID

## 2020-07-17 VITALS
WEIGHT: 210 LBS | DIASTOLIC BLOOD PRESSURE: 75 MMHG | HEART RATE: 84 BPM | HEIGHT: 67 IN | BODY MASS INDEX: 32.96 KG/M2 | SYSTOLIC BLOOD PRESSURE: 108 MMHG

## 2020-07-17 VITALS — TEMPERATURE: 95.4 F

## 2020-07-17 PROCEDURE — 99213 OFFICE O/P EST LOW 20 MIN: CPT

## 2020-07-17 NOTE — PHYSICAL EXAM
[General Appearance - Alert] : alert [General Appearance - In No Acute Distress] : in no acute distress [General Appearance - Well-Appearing] : healthy appearing [Oriented To Time, Place, And Person] : oriented to person, place, and time [Affect] : the affect was normal [Mood] : the mood was normal [Motor Strength] : muscle strength was normal in all four extremities [Paresis Pronator Drift Right-Sided] : no pronator drift on the right [Paresis Pronator Drift Left-Sided] : no pronator drift on the left [Motor Strength Lower Extremities Bilaterally] : strength was normal in both lower extremities [Motor Strength Upper Extremities Bilaterally] : strength was normal in both upper extremities [Sclera] : the sclera and conjunctiva were normal [PERRL With Normal Accommodation] : pupils were equal in size, round, reactive to light, with normal accommodation [] : no respiratory distress [Extraocular Movements] : extraocular movements were intact [Abnormal Walk] : normal gait [Edema] : there was no peripheral edema

## 2020-07-17 NOTE — HISTORY OF PRESENT ILLNESS
[Headache] : headache [FreeTextEntry1] : Pt has had a migraine for 7 days. Was in ED last Friday , 7/10/2020 -received IV Reglan , Benadryl and Toradol For migraine and nausea and vomiting. \par Pt took Po Toradol at home , the vomiting has subsided but migraine persists. \par Pt was unable to get Nurtec or Naratriptan due to insurance reasons.\par No other health issues reported today.  [Nausea] : nausea [Vomiting] : Vomiting [Photophobia] : photophobia [Daily] : daily [> 4 hours] : > 4 hours

## 2020-07-17 NOTE — REVIEW OF SYSTEMS
[Fever] : no fever [Chills] : no chills [Chest Pain] : no chest pain [Palpitations] : no palpitations [Shortness Of Breath] : no shortness of breath [Cough] : no cough [Abdominal Pain] : no abdominal pain [Vomiting] : vomiting [Dizziness] : no dizziness [Fainting] : no fainting

## 2020-07-29 ENCOUNTER — APPOINTMENT (OUTPATIENT)
Dept: RHEUMATOLOGY | Facility: CLINIC | Age: 30
End: 2020-07-29
Payer: MEDICAID

## 2020-07-29 ENCOUNTER — APPOINTMENT (OUTPATIENT)
Dept: OBGYN | Facility: CLINIC | Age: 30
End: 2020-07-29

## 2020-07-29 PROCEDURE — 96413 CHEMO IV INFUSION 1 HR: CPT

## 2020-07-29 PROCEDURE — 36415 COLL VENOUS BLD VENIPUNCTURE: CPT

## 2020-08-12 ENCOUNTER — APPOINTMENT (OUTPATIENT)
Dept: OBGYN | Facility: CLINIC | Age: 30
End: 2020-08-12

## 2020-08-23 ENCOUNTER — TRANSCRIPTION ENCOUNTER (OUTPATIENT)
Age: 30
End: 2020-08-23

## 2020-08-25 ENCOUNTER — TRANSCRIPTION ENCOUNTER (OUTPATIENT)
Age: 30
End: 2020-08-25

## 2020-08-26 ENCOUNTER — TRANSCRIPTION ENCOUNTER (OUTPATIENT)
Age: 30
End: 2020-08-26

## 2020-08-27 ENCOUNTER — TRANSCRIPTION ENCOUNTER (OUTPATIENT)
Age: 30
End: 2020-08-27

## 2020-08-27 ENCOUNTER — APPOINTMENT (OUTPATIENT)
Dept: INTERNAL MEDICINE | Facility: CLINIC | Age: 30
End: 2020-08-27
Payer: MEDICAID

## 2020-08-27 DIAGNOSIS — R05 COUGH: ICD-10-CM

## 2020-08-27 DIAGNOSIS — J45.909 UNSPECIFIED ASTHMA, UNCOMPLICATED: ICD-10-CM

## 2020-08-27 PROCEDURE — 99213 OFFICE O/P EST LOW 20 MIN: CPT | Mod: 95

## 2020-08-27 NOTE — PHYSICAL EXAM
[No Acute Distress] : no acute distress [Well Nourished] : well nourished [Well Developed] : well developed [No Respiratory Distress] : no respiratory distress

## 2020-08-27 NOTE — ASSESSMENT
[FreeTextEntry1] : likely asthmatic bronchitis\par Doxycycline 100 mg po  bid x 7 days \par Albuterol via neb q 4-6 hrs PRN \par Prednisone 20 mg po BID x 5 days \par Tessalon Perles TID PRN \par CXRY PA/LAT r/o pneumonia \par Call if not improving \par Discussed sx's to warrant ER attention

## 2020-08-27 NOTE — HISTORY OF PRESENT ILLNESS
[Home] : at home, [unfilled] , at the time of the visit. [Verbal consent obtained from patient] : the patient, [unfilled] [Medical Office: ([facility])___] : at the medical office located in  [FreeTextEntry8] : Pt is a 30 yr. old female with a h/ of asthma , migraines, GERD and anxiety. She reports went to Urgent Care on Sunday with c/ of sore  throat , cough and green colored sputum. She feels her asthma is bothering her more with the high humidity . Cough has been keeping her up at night and she is using her Albuterol  via nebulizer a few times a day for the last few days.  Per pt COVID PCR was negative, rapid strep was negative also at Urgent Care.  She does not use any daily maintenance  inhalers. \par Her sore throat has since resolved. \par She denies fever, shortness of breath , lightheadedness, chest pain.

## 2020-08-27 NOTE — REVIEW OF SYSTEMS
[Chills] : no chills [Fever] : no fever [Fatigue] : fatigue [Cough] : cough [Dyspnea on Exertion] : no dyspnea on exertion [Shortness Of Breath] : no shortness of breath [FreeTextEntry6] : see HPI  [Negative] : Integumentary

## 2020-08-28 ENCOUNTER — APPOINTMENT (OUTPATIENT)
Dept: RADIOLOGY | Facility: CLINIC | Age: 30
End: 2020-08-28
Payer: MEDICAID

## 2020-08-28 ENCOUNTER — OUTPATIENT (OUTPATIENT)
Dept: OUTPATIENT SERVICES | Facility: HOSPITAL | Age: 30
LOS: 1 days | End: 2020-08-28
Payer: MEDICAID

## 2020-08-28 DIAGNOSIS — Z33.2 ENCOUNTER FOR ELECTIVE TERMINATION OF PREGNANCY: Chronic | ICD-10-CM

## 2020-08-28 DIAGNOSIS — Z98.89 OTHER SPECIFIED POSTPROCEDURAL STATES: Chronic | ICD-10-CM

## 2020-08-28 DIAGNOSIS — R05 COUGH: ICD-10-CM

## 2020-08-28 PROCEDURE — 71046 X-RAY EXAM CHEST 2 VIEWS: CPT

## 2020-08-28 PROCEDURE — 71046 X-RAY EXAM CHEST 2 VIEWS: CPT | Mod: 26

## 2020-08-31 ENCOUNTER — APPOINTMENT (OUTPATIENT)
Dept: ENDOCRINOLOGY | Facility: CLINIC | Age: 30
End: 2020-08-31
Payer: MEDICAID

## 2020-08-31 VITALS
BODY MASS INDEX: 34.53 KG/M2 | OXYGEN SATURATION: 98 % | WEIGHT: 220 LBS | HEART RATE: 88 BPM | SYSTOLIC BLOOD PRESSURE: 112 MMHG | DIASTOLIC BLOOD PRESSURE: 70 MMHG | TEMPERATURE: 98.2 F | HEIGHT: 67 IN

## 2020-08-31 DIAGNOSIS — E04.1 NONTOXIC SINGLE THYROID NODULE: ICD-10-CM

## 2020-08-31 DIAGNOSIS — E06.3 OTHER SPECIFIED HYPOTHYROIDISM: ICD-10-CM

## 2020-08-31 DIAGNOSIS — E03.8 OTHER SPECIFIED HYPOTHYROIDISM: ICD-10-CM

## 2020-08-31 PROCEDURE — 99203 OFFICE O/P NEW LOW 30 MIN: CPT

## 2020-08-31 RX ORDER — DOXYCYCLINE HYCLATE 100 MG/1
100 CAPSULE ORAL
Qty: 14 | Refills: 0 | Status: COMPLETED | COMMUNITY
Start: 2020-08-27 | End: 2020-08-31

## 2020-08-31 RX ORDER — LEVOTHYROXINE SODIUM 0.07 MG/1
75 TABLET ORAL DAILY
Qty: 90 | Refills: 1 | Status: DISCONTINUED | COMMUNITY
Start: 2017-03-07 | End: 2020-08-31

## 2020-08-31 NOTE — HISTORY OF PRESENT ILLNESS
[FreeTextEntry1] : 30-year-old female with history of thyroid nodules, here for new patient visit. Was following with Dr. Jane in past, last visit was in 2018.\par \par Thyroid History: \par She was told of having thyroiditis as a teenager and does not recall exactly but states she might have taken Levothyroxine then. Was told of having thyroid nodules about 8-10 years ago and had a benign FNA in Nov 2014 for 1.5 cm left thyroid nodule.  \par She also has multiple lymph nodes that started around the time of her pregnancy in 2014 when she had identical twin girls. Had been biopsied in the past by Dr. Ivan and they were benign\par Was taking Levothyroxine 75 mcg until last year. \par No past use of amiodarone or lithium. \par No recent IV contrast use. \par Family history of hypothyroidism in mother. \par \par Currently reports fatigue and weight gain (was prescribed phentermine by rheum but asked to get endo evaluation before starting it). Endorses constipation. No palpitations or tremors. No heat or cold intolerance. No hair loss or dry skin. Does not have periods has an IUD. \par Reports occasional neck pain and dysphagia upon lying down. Occasional dyspnea but attributes to her asthma. No dysphonia. \par \par She lives with her mother and 3 children (8 year old son and 6 year old twin daughters).\par She also for positive MIRTHA polyarthritis and lymphadenopathy, follows with rheumatologist and was on high dose prednisone in the past. Bone density in past showed osteopenia of forearm. \par Now not on daily prednisone, but do receive short courses of prednisone for her asthma, currently says taking 5 mg bid. \par Also reports history of migraine, on medications currently. \par

## 2020-08-31 NOTE — PHYSICAL EXAM
[Alert] : alert [Well Nourished] : well nourished [No Acute Distress] : no acute distress [Well Developed] : well developed [Normal Sclera/Conjunctiva] : normal sclera/conjunctiva [No Proptosis] : no proptosis [Normal Hearing] : hearing was normal [No Neck Mass] : no neck mass was observed [Supple] : the neck was supple [No Accessory Muscle Use] : no accessory muscle use [No Respiratory Distress] : no respiratory distress [Clear to Auscultation] : lungs were clear to auscultation bilaterally [Normal S1, S2] : normal S1 and S2 [Normal Rate] : heart rate was normal [Regular Rhythm] : with a regular rhythm [No Edema] : no peripheral edema [Normal Bowel Sounds] : normal bowel sounds [Not Tender] : non-tender [Not Distended] : not distended [Soft] : abdomen soft [Normal Gait] : normal gait [No Clubbing, Cyanosis] : no clubbing  or cyanosis of the fingernails [No Rash] : no rash [No Motor Deficits] : the motor exam was normal [No Tremors] : no tremors [Oriented x3] : oriented to person, place, and time [Normal Affect] : the affect was normal [Normal Mood] : the mood was normal [de-identified] : Obese female

## 2020-08-31 NOTE — END OF VISIT
[] : Fellow [FreeTextEntry3] : Ms. Aisha Roper is a 30-year-old female with history of thyroid nodules, hypothyroidism, here for endocrinology follow-up after a long lapse of care in our endocrinology office.  She was last seen in May 2018.  Patient reports that she was on levothyroxine 75 mcg once daily, which she has stopped about 3 months ago.  The reason for stopping the medication was the lack of medication refills.  She reports no symptoms of hypothyroidism.  We will check a thyroid function and adjust the dose of levothyroxine as needed. \par \par In regards to thyroid nodule, her most recent ultrasound was done in February 2020.  Which showed that her left thyroid nodule was decrease in size, there is a right thyroid cyst.\par \par Of note, patient had a benign fine-needle aspiration on the 1.5 cm left thyroid nodule.  This was done in November 2014.\par \par Would recommend to repeat thyroid ultrasound in February 2021.  Thyroid ultrasound order is placed.\par \par

## 2020-08-31 NOTE — REVIEW OF SYSTEMS
[Fatigue] : fatigue [Recent Weight Gain (___ Lbs)] : recent weight gain: [unfilled] lbs [Dysphagia] : dysphagia [Neck Pain] : neck pain [Cough] : cough [Shortness Of Breath] : shortness of breath [As Noted in HPI] : as noted in HPI [Constipation] : constipation [Headaches] : headaches [All other systems negative] : All other systems negative [Dry Eyes] : no dryness [Blurred Vision] : no blurred vision [Dysphonia] : no dysphonia [Chest Pain] : no chest pain [Nausea] : no nausea [Palpitations] : no palpitations [Vomiting] : no vomiting [Polyuria] : no polyuria [Dry Skin] : no dry skin [Hair Loss] : no hair loss [Dizziness] : no dizziness [Polydipsia] : no polydipsia [Cold Intolerance] : no cold intolerance [Heat Intolerance] : no heat intolerance

## 2020-08-31 NOTE — DATA REVIEWED
[FreeTextEntry1] : TFTs from 6/2020\par TSH: 3.88\par Free T4: 0.8\par \par Thyroid US\par PROCEDURE DATE:  02/15/2020  \par INTERPRETATION:  CLINICAL INFORMATION: Cervicalgia. Hashimoto's thyroiditis. Nodules.\par COMPARISON: September 4, 2018 and October 18, 2014.\par TECHNIQUE: Sonography of the thyroid. \par FINDINGS:\par Right Lobe: 5.1 x 1.5 x 1.8 cm. Increased size of an upper pole cyst, measuring 0.8 x 0.7 x 0.8 cm, previously 0.4 x 0.3 x 0.5 cm.\par Left Lobe: 4.8 x 1.5 x 1.4 cm. Heterogeneous in echotexture. Slightly decreased size of heterogeneous nodule in the medial lower pole with coarse calcifications measuring 1.1 x 0.6 x 0.9 cm, previously 1.1 x 0.7 x 1.4 cm (TI-RADS 4).\par Isthmus: 2 mm.\par Cervical Lymph Nodes: No enlarged or abnormal morphology cervical nodes.\par IMPRESSION:\par Slightly decreased size of nodule in the left lower pole, measuring 1.1 cm, which is not significantly changed in size since October 18, 2014.\par

## 2020-09-02 ENCOUNTER — RESULT CHARGE (OUTPATIENT)
Age: 30
End: 2020-09-02

## 2020-09-02 ENCOUNTER — OUTPATIENT (OUTPATIENT)
Dept: OUTPATIENT SERVICES | Facility: HOSPITAL | Age: 30
LOS: 1 days | End: 2020-09-02
Payer: MEDICAID

## 2020-09-02 ENCOUNTER — LABORATORY RESULT (OUTPATIENT)
Age: 30
End: 2020-09-02

## 2020-09-02 ENCOUNTER — APPOINTMENT (OUTPATIENT)
Dept: OBGYN | Facility: CLINIC | Age: 30
End: 2020-09-02
Payer: MEDICAID

## 2020-09-02 DIAGNOSIS — Z33.2 ENCOUNTER FOR ELECTIVE TERMINATION OF PREGNANCY: Chronic | ICD-10-CM

## 2020-09-02 DIAGNOSIS — R87.612 LOW GRADE SQUAMOUS INTRAEPITHELIAL LESION ON CYTOLOGIC SMEAR OF CERVIX (LGSIL): ICD-10-CM

## 2020-09-02 DIAGNOSIS — N76.0 ACUTE VAGINITIS: ICD-10-CM

## 2020-09-02 DIAGNOSIS — Z98.89 OTHER SPECIFIED POSTPROCEDURAL STATES: Chronic | ICD-10-CM

## 2020-09-02 PROCEDURE — 57454 BX/CURETT OF CERVIX W/SCOPE: CPT

## 2020-09-02 PROCEDURE — 57454 BX/CURETT OF CERVIX W/SCOPE: CPT | Mod: NC

## 2020-09-02 PROCEDURE — 99213 OFFICE O/P EST LOW 20 MIN: CPT | Mod: 25,GC

## 2020-09-02 PROCEDURE — G0463: CPT

## 2020-09-03 LAB
T4 FREE SERPL-MCNC: 0.8 NG/DL
TSH SERPL-ACNC: 3.29 UIU/ML

## 2020-09-03 RX ORDER — LEVOTHYROXINE SODIUM 0.05 MG/1
50 TABLET ORAL DAILY
Qty: 30 | Refills: 5 | Status: ACTIVE | COMMUNITY
Start: 2020-09-03 | End: 1900-01-01

## 2020-09-08 LAB
HCG UR QL: NEGATIVE
QUALITY CONTROL: YES

## 2020-09-10 DIAGNOSIS — N87.1 MODERATE CERVICAL DYSPLASIA: ICD-10-CM

## 2020-09-24 ENCOUNTER — LABORATORY RESULT (OUTPATIENT)
Age: 30
End: 2020-09-24

## 2020-09-24 ENCOUNTER — APPOINTMENT (OUTPATIENT)
Dept: RHEUMATOLOGY | Facility: CLINIC | Age: 30
End: 2020-09-24
Payer: MEDICAID

## 2020-09-24 PROBLEM — Z23 ENCOUNTER FOR IMMUNIZATION: Status: ACTIVE | Noted: 2019-10-22

## 2020-09-24 PROCEDURE — 90686 IIV4 VACC NO PRSV 0.5 ML IM: CPT

## 2020-09-24 PROCEDURE — 96413 CHEMO IV INFUSION 1 HR: CPT

## 2020-09-24 PROCEDURE — 36415 COLL VENOUS BLD VENIPUNCTURE: CPT

## 2020-09-24 PROCEDURE — G0008: CPT

## 2020-10-13 ENCOUNTER — TRANSCRIPTION ENCOUNTER (OUTPATIENT)
Age: 30
End: 2020-10-13

## 2020-10-15 ENCOUNTER — APPOINTMENT (OUTPATIENT)
Dept: RHEUMATOLOGY | Facility: CLINIC | Age: 30
End: 2020-10-15
Payer: COMMERCIAL

## 2020-10-15 VITALS
SYSTOLIC BLOOD PRESSURE: 127 MMHG | RESPIRATION RATE: 16 BRPM | OXYGEN SATURATION: 98 % | WEIGHT: 219 LBS | HEART RATE: 73 BPM | BODY MASS INDEX: 34.37 KG/M2 | TEMPERATURE: 98 F | HEIGHT: 67 IN | DIASTOLIC BLOOD PRESSURE: 78 MMHG

## 2020-10-15 DIAGNOSIS — J45.909 UNSPECIFIED ASTHMA, UNCOMPLICATED: ICD-10-CM

## 2020-10-15 DIAGNOSIS — R05 COUGH: ICD-10-CM

## 2020-10-15 DIAGNOSIS — M06.9 RHEUMATOID ARTHRITIS, UNSPECIFIED: ICD-10-CM

## 2020-10-15 PROCEDURE — 99214 OFFICE O/P EST MOD 30 MIN: CPT | Mod: 25

## 2020-10-15 PROCEDURE — G0009: CPT

## 2020-10-15 PROCEDURE — 90732 PPSV23 VACC 2 YRS+ SUBQ/IM: CPT

## 2020-10-15 RX ORDER — BENZONATATE 100 MG/1
100 CAPSULE ORAL 3 TIMES DAILY
Qty: 30 | Refills: 0 | Status: DISCONTINUED | COMMUNITY
Start: 2020-02-06 | End: 2020-10-15

## 2020-10-15 RX ORDER — FROVATRIPTAN SUCCINATE 2.5 MG/1
2.5 TABLET, FILM COATED ORAL
Qty: 8 | Refills: 3 | Status: DISCONTINUED | COMMUNITY
Start: 2020-07-17 | End: 2020-10-15

## 2020-10-15 RX ORDER — DOXYCYCLINE 100 MG/1
100 CAPSULE ORAL TWICE DAILY
Qty: 14 | Refills: 0 | Status: DISCONTINUED | COMMUNITY
Start: 2020-08-31 | End: 2020-10-15

## 2020-10-15 RX ORDER — PREDNISONE 20 MG/1
20 TABLET ORAL
Qty: 10 | Refills: 0 | Status: DISCONTINUED | COMMUNITY
Start: 2020-08-27 | End: 2020-10-15

## 2020-10-15 RX ORDER — SUMATRIPTAN SUCCINATE 6 MG/.5ML
6 INJECTION SUBCUTANEOUS
Qty: 18 | Refills: 3 | Status: DISCONTINUED | COMMUNITY
Start: 2017-06-29 | End: 2020-10-15

## 2020-10-15 RX ORDER — BENZONATATE 100 MG/1
100 CAPSULE ORAL EVERY 8 HOURS
Qty: 30 | Refills: 1 | Status: DISCONTINUED | COMMUNITY
Start: 2020-08-27 | End: 2020-10-15

## 2020-10-18 ENCOUNTER — MED ADMIN CHARGE (OUTPATIENT)
Age: 30
End: 2020-10-18

## 2020-10-18 PROBLEM — M06.9 ARTHRITIS, RHEUMATOID: Status: ACTIVE | Noted: 2020-04-20

## 2020-10-27 RX ORDER — PHENTERMINE HYDROCHLORIDE 37.5 MG/1
37.5 TABLET ORAL
Qty: 30 | Refills: 0 | Status: ACTIVE | COMMUNITY
Start: 2020-06-26 | End: 1900-01-01

## 2020-10-28 ENCOUNTER — OUTPATIENT (OUTPATIENT)
Dept: OUTPATIENT SERVICES | Facility: HOSPITAL | Age: 30
LOS: 1 days | End: 2020-10-28
Payer: COMMERCIAL

## 2020-10-28 ENCOUNTER — APPOINTMENT (OUTPATIENT)
Dept: CT IMAGING | Facility: CLINIC | Age: 30
End: 2020-10-28
Payer: COMMERCIAL

## 2020-10-28 DIAGNOSIS — R05 COUGH: ICD-10-CM

## 2020-10-28 DIAGNOSIS — Z33.2 ENCOUNTER FOR ELECTIVE TERMINATION OF PREGNANCY: Chronic | ICD-10-CM

## 2020-10-28 DIAGNOSIS — Z98.89 OTHER SPECIFIED POSTPROCEDURAL STATES: Chronic | ICD-10-CM

## 2020-10-28 PROCEDURE — 71250 CT THORAX DX C-: CPT | Mod: 26

## 2020-10-28 PROCEDURE — 71250 CT THORAX DX C-: CPT

## 2020-10-30 ENCOUNTER — APPOINTMENT (OUTPATIENT)
Dept: OPHTHALMOLOGY | Facility: CLINIC | Age: 30
End: 2020-10-30
Payer: COMMERCIAL

## 2020-10-30 ENCOUNTER — NON-APPOINTMENT (OUTPATIENT)
Age: 30
End: 2020-10-30

## 2020-10-30 PROCEDURE — 92004 COMPRE OPH EXAM NEW PT 1/>: CPT

## 2020-10-30 PROCEDURE — 92015 DETERMINE REFRACTIVE STATE: CPT

## 2020-10-30 PROCEDURE — 99072 ADDL SUPL MATRL&STAF TM PHE: CPT

## 2020-10-30 PROCEDURE — 92134 CPTRZ OPH DX IMG PST SGM RTA: CPT

## 2020-11-02 ENCOUNTER — APPOINTMENT (OUTPATIENT)
Dept: NEUROSURGERY | Facility: CLINIC | Age: 30
End: 2020-11-02
Payer: COMMERCIAL

## 2020-11-02 PROCEDURE — 99213 OFFICE O/P EST LOW 20 MIN: CPT | Mod: 95

## 2020-11-04 ENCOUNTER — TRANSCRIPTION ENCOUNTER (OUTPATIENT)
Age: 30
End: 2020-11-04

## 2020-11-06 NOTE — PHYSICAL EXAM
[General Appearance - Alert] : alert [General Appearance - In No Acute Distress] : in no acute distress [FreeTextEntry1] : Depressed affect [FreeTextEntry6] : RADHA
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
## 2020-11-10 ENCOUNTER — APPOINTMENT (OUTPATIENT)
Dept: PAIN MANAGEMENT | Facility: CLINIC | Age: 30
End: 2020-11-10
Payer: COMMERCIAL

## 2020-11-10 PROCEDURE — 99213 OFFICE O/P EST LOW 20 MIN: CPT | Mod: 95

## 2020-11-10 RX ORDER — PAROXETINE HYDROCHLORIDE 40 MG/1
40 TABLET, FILM COATED ORAL DAILY
Qty: 30 | Refills: 1 | Status: ACTIVE | COMMUNITY
Start: 2017-08-21 | End: 1900-01-01

## 2020-11-10 RX ORDER — HYDROXYZINE HYDROCHLORIDE 25 MG/1
25 TABLET ORAL
Qty: 60 | Refills: 2 | Status: ACTIVE | COMMUNITY
Start: 2020-02-28 | End: 1900-01-01

## 2020-11-10 RX ORDER — MECLIZINE HYDROCHLORIDE 12.5 MG/1
12.5 TABLET ORAL 3 TIMES DAILY
Qty: 90 | Refills: 2 | Status: DISCONTINUED | COMMUNITY
Start: 2020-02-12 | End: 2020-11-10

## 2020-11-10 NOTE — REASON FOR VISIT
[Home] : at home, [unfilled] , at the time of the visit. [Medical Office: ([facility])___] : at the medical office located in  [Verbal consent obtained from patient] : the patient, [unfilled] [Follow-Up: _____] : a [unfilled] follow-up visit

## 2020-11-10 NOTE — HISTORY OF PRESENT ILLNESS
[Headache] : headache [Nausea] : nausea [Photophobia] : photophobia [Phonophobia] : phonophobia [Daily] : daily [FreeTextEntry1] : Pt explains she has had an increase in migraine frequency . Waking up often with migraine , has had a migraine for 6 days . Pt reports her anxiety is very high and she is experiencing very frequent panic attacks.  \par She also feels depressed but fully denies suicidal ideations. \par Discussed the role anxiety can play in migraine frequency .\par Phone # for behavioral health given to patient.

## 2020-11-10 NOTE — REVIEW OF SYSTEMS
[Anxiety] : anxiety [Depression] : depression [Fever] : no fever [Chills] : no chills [Chest Pain] : no chest pain [Palpitations] : no palpitations [Suicidal] : not suicidal

## 2020-11-10 NOTE — ASSESSMENT
[FreeTextEntry1] : Pt to call Behavioral Health today for an appt . \par Increase Paxil to 40mg 1x/day

## 2020-11-10 NOTE — PHYSICAL EXAM
[General Appearance - Alert] : alert [General Appearance - In No Acute Distress] : in no acute distress [General Appearance - Well-Appearing] : healthy appearing [Oriented To Time, Place, And Person] : oriented to person, place, and time [Affect] : the affect was normal [Mood] : the mood was normal [Cranial Nerves Facial (VII)] : face symmetrical [Cranial Nerves Accessory (XI - Cranial And Spinal)] : head turning and shoulder shrug symmetric [Cranial Nerves Hypoglossal (XII)] : there was no tongue deviation with protrusion [Sclera] : the sclera and conjunctiva were normal [] : no respiratory distress [Respiration, Rhythm And Depth] : normal respiratory rhythm and effort [Paresis Pronator Drift Right-Sided] : no pronator drift on the right [Paresis Pronator Drift Left-Sided] : no pronator drift on the left

## 2020-11-18 ENCOUNTER — APPOINTMENT (OUTPATIENT)
Dept: RHEUMATOLOGY | Facility: CLINIC | Age: 30
End: 2020-11-18

## 2020-11-19 ENCOUNTER — APPOINTMENT (OUTPATIENT)
Dept: RHEUMATOLOGY | Facility: CLINIC | Age: 30
End: 2020-11-19
Payer: COMMERCIAL

## 2020-11-19 ENCOUNTER — LABORATORY RESULT (OUTPATIENT)
Age: 30
End: 2020-11-19

## 2020-11-19 PROCEDURE — 96413 CHEMO IV INFUSION 1 HR: CPT

## 2020-11-19 PROCEDURE — 36415 COLL VENOUS BLD VENIPUNCTURE: CPT

## 2020-12-01 ENCOUNTER — APPOINTMENT (OUTPATIENT)
Dept: OPHTHALMOLOGY | Facility: CLINIC | Age: 30
End: 2020-12-01

## 2020-12-02 ENCOUNTER — APPOINTMENT (OUTPATIENT)
Dept: OPHTHALMOLOGY | Facility: CLINIC | Age: 30
End: 2020-12-02
Payer: COMMERCIAL

## 2020-12-02 ENCOUNTER — NON-APPOINTMENT (OUTPATIENT)
Age: 30
End: 2020-12-02

## 2020-12-02 PROCEDURE — 92310 CONTACT LENS FITTING OU: CPT

## 2020-12-03 ENCOUNTER — APPOINTMENT (OUTPATIENT)
Dept: OPHTHALMOLOGY | Facility: CLINIC | Age: 30
End: 2020-12-03

## 2020-12-17 ENCOUNTER — RX RENEWAL (OUTPATIENT)
Age: 30
End: 2020-12-17

## 2020-12-17 RX ORDER — NARATRIPTAN 2.5 MG/1
2.5 TABLET, FILM COATED ORAL
Qty: 9 | Refills: 3 | Status: ACTIVE | COMMUNITY
Start: 2020-07-13 | End: 1900-01-01

## 2020-12-17 RX ORDER — TIZANIDINE 2 MG/1
2 TABLET ORAL
Qty: 30 | Refills: 1 | Status: ACTIVE | COMMUNITY
Start: 2020-12-17 | End: 1900-01-01

## 2020-12-18 ENCOUNTER — RX RENEWAL (OUTPATIENT)
Age: 30
End: 2020-12-18

## 2020-12-19 RX ORDER — PAROXETINE HYDROCHLORIDE 30 MG/1
30 TABLET, FILM COATED ORAL
Qty: 30 | Refills: 3 | Status: ACTIVE | COMMUNITY
Start: 2020-12-19 | End: 1900-01-01

## 2021-01-14 ENCOUNTER — APPOINTMENT (OUTPATIENT)
Dept: RHEUMATOLOGY | Facility: CLINIC | Age: 31
End: 2021-01-14
Payer: COMMERCIAL

## 2021-01-14 ENCOUNTER — LABORATORY RESULT (OUTPATIENT)
Age: 31
End: 2021-01-14

## 2021-01-14 PROCEDURE — 99072 ADDL SUPL MATRL&STAF TM PHE: CPT

## 2021-01-14 PROCEDURE — 36415 COLL VENOUS BLD VENIPUNCTURE: CPT

## 2021-01-14 PROCEDURE — 96413 CHEMO IV INFUSION 1 HR: CPT

## 2021-01-14 RX ORDER — GOLIMUMAB 50 MG/4ML
50 SOLUTION INTRAVENOUS
Qty: 4 | Refills: 6 | Status: ACTIVE | OUTPATIENT
Start: 2018-07-02

## 2021-01-27 ENCOUNTER — TRANSCRIPTION ENCOUNTER (OUTPATIENT)
Age: 31
End: 2021-01-27

## 2021-02-05 NOTE — CONSULT NOTE ADULT - PROBLEM SELECTOR PROBLEM 1
Summarization Of Episode

                             Created on: 2021



JUAN KYLE VERONICA

External Reference #: [id number]

: 1993

Sex: Female



Demographics





                          Address                   [address]  [place]  [address]

 

                          Home Phone                [phone]

 

                          Preferred Language        English

 

                          Marital Status            Single

 

                          Yarsani Affiliation     NONE

 

                          Race                      White

 

                          Ethnic Group              Not  or 





Author





                          Author                    [facility]

 

                          Organization              [facility]

 

                          Address                   Unknown

 

                          Phone                     Unavailable







Support





                Name            Relationship    Address         Phone

 

                    JEFFCODSS           Next Of Kin         [address] [place] [address]  [address]                    [phone]

 

                    AMERICAN GREETINGS  Next Of Kin         [address] [place] [address]  [address]                    [phone]

 

                    FUCILLOWAT          [place]  [address]                    [phone]

 

                    CONVERGY'S          Next Of Kin         [address] [place] [address]  [address]                    [phone]

 

                    STREAM              Next Of Kin         [address] [place] [address]  [address]                    [phone]

 

                    DESIRE               [place]  [address]                    [phone]

 

                UE              Next Of Kin     Unknown         Unavailable

 

                    NANETTE DREW         Next Of Kin         [address] 

[place]  [address]                  [phone]

 

                AKOSUA DREW    Next Of Kin     Unknown         [phone]

 

                    KYLE DIALLO     Next Of Kin         [address] 

[place], [place]  [id number]              [phone]

 

                AKOSUA DREW    Next Of Kin     Unknown         [phone]

 

                    NANETTE DREW         ECON                [address]

[place]  [address]                    [phone]







Care Team Providers





                    Care Team Member Name Role                Phone

 

                    RING, K JEAN-CLAUDE PA  Unavailable         Unavailable

 

                    RING, K JEAN-CLAUDE PA  Unavailable         Unavailable

 

                    RING, K JEAN-CLAUDE PA  Unavailable         Unavailable

 

                    RING, K JEAN-CLAUDE PA  Unavailable         Unavailable

 

                    RING, K JEAN-CLAUDE PA  Unavailable         Unavailable

 

                    RING, K JEAN-CLAUDE PA  Unavailable         Unavailable

 

                    RING, K JEAN-CLAUDE PA  Unavailable         Unavailable

 

                    RING, K JEAN-CLAUDE PA  Unavailable         Unavailable

 

                    RING, K JEAN-CLAUDE PA  Unavailable         Unavailable

 

                    RING, K JEAN-CLAUDE PA  Unavailable         Unavailable

 

                    RING, K JEAN-CLAUDE PA  Unavailable         Unavailable

 

                    RING, K JEAN-CLAUDE PA  Unavailable         Unavailable

 

                    RING, K JEAN-CLAUDE PA  Unavailable         Unavailable

 

                    RING, K JEAN-CLAUDE PA  Unavailable         Unavailable

 

                    RING, K JEAN-CLAUDE PA  Unavailable         Unavailable

 

                    RING, K JEAN-CLAUDE PA  Unavailable         Unavailable

 

                    RING, K JEAN-CLAUDE PA  Unavailable         Unavailable

 

                    RING, K JEAN-CLAUDE PA  Unavailable         Unavailable

 

                    RING, K JEAN-CLAUDE PA  Unavailable         Unavailable

 

                    RING, K JEAN-CLAUDE PA  Unavailable         Unavailable

 

                    RING, K JEAN-CLAUDE PA  Unavailable         Unavailable

 

                    Darling, Susan Peggy PA Unavailable         Unavailable

 

                    Darling, Susan Peggy PA Unavailable         Unavailable

 

                    Darling, Susan Peggy PA Unavailable         Unavailable

 

                    Darling, Susan Peggy PA Unavailable         Unavailable

 

                    Darling, Susan Peggy PA Unavailable         Unavailable

 

                    Darling, Susan Peggy PA Unavailable         Unavailable

 

                    Darling, Susan Peggy PA Unavailable         Unavailable

 

                    Darling, Susan Peggy PA Unavailable         Unavailable

 

                    Darling, Susan Peggy PA Unavailable         Unavailable

 

                    Darling, Susan Peggy PA Unavailable         Unavailable



                                  



Re-disclosure Warning

          The records that you are about to access may contain information from 
federally-assisted alcohol or drug abuse programs. If such information is 
present, then the following federally mandated warning applies: This information
has been disclosed to you from records protected by federal confidentiality 
rules (42 CFR part 2). The federal rules prohibit you from making any further 
disclosure of this information unless further disclosure is expressly permitted 
by the written consent of the person to whom it pertains or as otherwise 
permitted by 42 CFR part 2. A general authorization for the release of medical 
or other information is NOT sufficient for this purpose. The Federal rules 
restrict any use of the information to criminally investigate or prosecute any 
alcohol or drug abuse patient.The records that you are about to access may 
contain highly sensitive health information, the redisclosure of which is 
protected by Article 27-F of the [facility] Public Health law. If you 
continue you may have access to information: Regarding HIV / AIDS; Provided by 
facilities licensed or operated by the [facility] Office of Mental Health; 
or Provided by the [facility] Office for People With Developmental 
Disabilities. If such information is present, then the following [place] 
mandated warning applies: This information has been disclosed to you from 
confidential records which are protected by state law. State law prohibits you 
from making any further disclosure of this information without the specific 
written consent of the person to whom it pertains, or as otherwise permitted by 
law. Any unauthorized further disclosure in violation of state law may result in
a fine or assisted sentence or both. A general authorization for the release of 
medical or other information is NOT sufficient authorization for further disc
losure.                                                                         
    



Family History

          



             Family Member Name Family Member Gender Family Member Status Date o

f Status 

Description                             Data Source(s)

 

           Unknown    Unknown    Problem                          [facility] ([facility]

[facility])

 

           Unknown    Unknown    Problem                          [facility] ([facility]

dy Rob Workman MD )



                                                                                
                 



Encounters

          



           Encounter  Providers  Location   Date       Indications Data Source(s

)

 

                Unknown                         [address] [facility] [id number] 2021 12:00:00 AM 

EST                                                 eCW1 ([facility])

 

                Outpatient                      [address] [place] [phone] 2021 12:00:00 AM

EST                                                 eCW1 ([facility])

 

                Outpatient      Attender: Peggy Chery

deidre 10/06/2020 

08:45:00 AM EDT                                     MED[facility] ([facility]

[facility]

 

                Outpatient                      [address] [place] [phone] 2020 12:00:00 AM

EDT                                                 eCW1 ([facility])

 

                Outpatient      Attender: JEAN-CLAUDE Pacheco Primary 

2020 05:15:00 

PM EDT                                              [facility] ([facility]

[facility])

 

                [facility]                 [address] [place] [phone] 2020 

12:00:00 AM EST                                     eCW1 ([facility])

 

                Outpatient      Attender: JEAN-CLAUDE Pacheco Primary 

2020 07:15:00 

AM EST                                              [facility] ([facility]

[facility])



                                                                                
                                                                   



Medications

          



          Medication Brand Name Start Date Product Form Dose      Route     Admi

nistrative 

Instructions Pharmacy Instructions Status     Indications Reaction   Description

 Data 

Source(s)

 

          100 mcg             2021 12:00:00 AM EST tablet    30           

       TAKE ONE TABLET BY MOUTH EVERY 

MORNING ON AN EMPTY STOMACH             TAKE ONE TABLET BY MOUTH EVERY MORNING O

N AN EMPTY 

STOMACH      SOLD: 2021                                        [facility]

s

 

                          Levothyroxine Sodium 0.1 MG Oral Tablet Levothyroxine 

Sodium 100 MCG 

Levothyroxine Sodium 100 MCG 2021 12:00:00 AM EST                         

           active               

Levothyroxine Sodium 100 MCG            eCW1 ([facility])

 

                          Levothyroxine Sodium 0.1 MG Oral Tablet Levothyroxine 

Sodium 100 MCG 

Levothyroxine Sodium 100 MCG 2021 12:00:00 AM EST                         

           active               

Levothyroxine Sodium 100 MCG            eCW1 ([facility])

 

                    Metronidazole 500 MG Oral Tablet [Flagyl] Flagyl 500 MG Flag

yl 500 MG       2020

 12:00:00 AM EDT        1.0 {tablet}                      suspended             

  Flagyl 500 MG eCW1 

([facility])

 

           Metronidazole 500 MG Oral Tablet [Flagyl] Flagyl     2020 12:00

:00 AM EDT                       

ORAL                          completed                               [facility] ([facility])

 

                    Metronidazole 500 MG Oral Tablet [Flagyl] Flagyl 500 MG Flag

yl 500 MG       2020

 12:00:00 AM EDT        1.0 {tablet}                      suspended             

  Flagyl 500 MG eCW1 

([facility])

 

                    Metronidazole 500 MG Oral Tablet [Flagyl] Flagyl 500 MG Flag

yl 500 MG       2020

 12:00:00 AM EDT        1.0 {tablet}                      active               F

lagyl 500 MG eCW1 ([facility])

 

          500 mg              2020 12:00:00 AM EDT tablet    14           

       TAKE ONE TABLET BY MOUTH TWICE A

 DAY FOR 7 DAYS           TAKE ONE TABLET BY MOUTH TWICE A DAY FOR 7 DAYS SOLD: 

2020

                                                                [facility]

 

        Naproxen 500 MG Oral Tablet Naproxen 2020 12:00:00 AM EST         

        ORAL                    

completed                                                       MEDENT ([facility])

 

          500 mg              2020 12:00:00 AM EST tablet    14           

       TAKE ONE TABLET BY MOUTH EVERY 

12 HOURS AS NEEDED FOR PAIN , MAY TAKE AFTER 6 IN THE EVENING 2020 TAKE ONE

 TABLET BY MOUTH EVERY 12 HOURS AS NEEDED FOR PAIN , MAY TAKE AFTER 6 IN THE 
EVENING 2020 SOLD: 2020                                        [facility]

 

          4 mg                2020 12:00:00 AM EST tablet    14           

       TAKE ONE TABLET BY MOUTH EVERY 8 

HOURS AS NEEDED FOR PAIN                TAKE ONE TABLET BY MOUTH EVERY 8 HOURS A

S NEEDED FOR 

PAIN         SOLD: 2020                                        [facility]

s

 

          tizanidine 4 MG Oral Tablet Tizanidine HCL 2020 12:00:00 AM EST 

                    ORAL       

                      completed                                   [facility] ([facility]

[facility]



                                                                                
                                                                                
                  



Insurance Providers

          



             Payer name   Policy type / Coverage type Policy ID    Covered party

 ID Covered 

party's relationship to solano Policy Solano             Plan Information

 

          [facility]           2001            SP               

   2001

 

          [facility]           2001            SP               

   2001

 

                    ANSI-Not a Secondary Insurance [id number]

[id number]                               

[id number]

 

                    ANSI-Commercial [id number]        

                       

[id number]

 

                    ANSI-Medicaid [id number]          

                     

[id number]

 

                    ANSI-Medicaid [id number]          

                     

[id number]

 

                    ANSI-Not a Secondary Insurance [id number]

[id number]                               

[id number]

 

                    ANSI-Not a Secondary Insurance [id number]

[id number]                               

[id number]

 

                    ANSI-Medicaid [id number]          

                     

[id number]

 

          [facility] [place] [facility] Maintenance [employer] (O) [id number]        

   Self                

[id number]

 

                    ANSI-Medicaid [id number]          

                     

[id number]

 

                    ANSI-Not a Secondary Insurance [id number]

[id number]                               

[id number]

 

          [facility]           [id number]           SP                  [id number]

[id number]

 

                    ANSI-Medicaid [id number]          

                     

[id number]

 

                    ANSI-Not a Secondary Insurance [id number]

[id number]                               

[id number]

 

                    ANSI-Not a Secondary Insurance [id number]

[id number]                               

[id number]

 

                    ANSI-Medicaid [id number]          

                     

[id number]

 

                    ANSI-Medicaid [id number]          

                     

[id number]

 

                    ANSI-Not a Secondary Insurance [id number]

[id number]                               

[id number]

 

          [facility]           [id number]           SP                  [id number]

[id number]

 

          MEDICAID            [id number]            SP                  [id number]

 

          BEATRICE             [id number]           SP                  [id number]

[id number]

 

          BE[facility] [place]         [id number]           S                   [id number]

[id number]

 

          [place] [place] [employer]                     Self                 

 

          [employer]                     Self                 

 

          [facility]           [id number]           SP                  

[id number]

 

          SELF PAY            UNAVAILABLE           SP                  UNAVAILA

BLE

 

          [employer] [facility]           [id number]           SP           

       [id number]

 

          [facility]           [id number]           [id number]

                  [id number]

 

                              [id number]                               [id number]



                                                                                
                                                                                
                                                                                
                                                                                
                                                                    



Surgeries/Procedures

          



             Procedure    Description  Date         Indications  Data Source(s)

 

             REMOVE DRUG IMPLANT DEVICE              2020 12:00:00 AM EST 

             eCW1 ([facility])

 

                    Therapeutic, Prophylactic Or Diagnostic Injection Subq/Im   

                  2020 12:00:00 

AM EST                                              [facility] ([facility])



                                                                                
                  



Results

          



                    ID                  Date                Data Source

 

                    [id number]            2021 09:12:00 AM EST [facility]









          Name      Value     Range     Interpretation Code Description Data Eliza

rce(s) Supporting 

Document(s)

 

          SARS-CoV-2 NEGATIVE                                [facility]     

 

                                        This lab was ordered by [facility]

[place] [id number] and reported by [facility]. 









                    ID                  Date                Data Source

 

                    [id number]            2021 12:00:00 AM EST [facility]









          Name      Value     Range     Interpretation Code Description Data Eliza

rce(s) Supporting 

Document(s)

 

          SARS-CoV-2 RT-PCR negative                                [facility]     

 

                                        This lab was ordered by [facility] and re

ported by [facility]. 









                    ID                  Date                Data Source

 

                    [id number]             10/06/2020 09:17:00 AM EDT [facility] ([facility])









          Name      Value     Range     Interpretation Code Description Data Eliza

rce(s) Supporting 

Document(s)

 

           Bacteria identified in Throat by Culture Laboratory test result      

                            [facility] 

([facility])            

 

                                        FULL REPORT IN LAB NOTES (eCW and [facility]

).

NORMAL JT PRESENT



ORGANISM 1: STREPTOCOCCUS GROUP G



QUANTITY OF GROWTH            MODERATE





ORGANISM 1: STREPTOCOCCUS GROUP G

 







                                        Procedure

 

                                          



                                                                                
                                      



Social History

          



           Code       Duration   Value      Status     Description Data Source(s

)

 

           Smoking    2021 12:00:00 AM EST Former Smoker completed  Former

 Smoker eCW1 

([facility])

 

           Smoking    2021 12:00:00 AM EST Former Smoker completed  Former

 Smoker eCW1 

([facility])

 

           Smoking    2020 12:00:00 AM EDT Former Smoker completed  Former

 Smoker eCW1 

([facility])

 

             Smoking      2020 12:00:00 AM EST Patient has never smoked co

mpleted    Patient 

has never smoked                        [facility] ([facility])



                                                                                
                                                



Vital Signs

          



                    ID                  Date                Data Source

 

                    UNK                                      









           Name       Value      Range      Interpretation Code Description Data

 Source(s)

 

           Diastolic blood pressure 68 mm[Hg]                        68 mm[Hg]  

eCW1 ([facility])

 

           Systolic blood pressure 105 mm[Hg]                       105 mm[Hg] e

CW1 ([facility])

 

           Body temperature 97.3 [degF]                       97.3 [degF] eCW1 (

[facility])

 

           Respiratory rate 18 /min                          18 /min    eCW1 ([facility])

 

           Heart rate 96 /min                          96 /min    eCW1 ([facility])

 

           Body mass index (BMI) [Ratio] 27.63 kg/m2                       27.63

 kg/m2 eCW1 ([facility])

 

           Body height 64 [in_i]                        64 [in_i]  eCW1 ([facility])

 

           Body weight 161 [lb_av]                       161 [lb_av] eCW1 ([facility])

 

           Body mass index (BMI) [Ratio] 26.6 kg/m2                       26.6 k

g/m2 [facility] ([facility])

 

           Body height 64 [in_i]                        64 [in_i]  [facility] ([facility])

 

                                        5'4" 

 

           Body weight 155.00 [lb_av]                       155.00 [lb_av] MEDEN

T ([facility] 

[facility])

 

           Body temperature 98.7 [degF]                       98.7 [degF] MED[facility]

 ([facility], 

[facility])

 

           Oxygen saturation in Arterial blood by Pulse oximetry 99 %           

                  99 %       [facility] 

([facility])

 

           Respiratory rate 16 /min                          16 /min    [facility] (

[facility])

 

           Heart rate 66 /min                          66 /min    MEDENT ([facility])

 

           Diastolic blood pressure 70 mm[Hg]                        70 mm[Hg]  

[facility] ([facility] 

[facility])

 

           Systolic blood pressure 110 mm[Hg]                       110 mm[Hg] M

EDENT ([facility]

 [facility])

 

           Diastolic blood pressure 64 mm[Hg]                        64 mm[Hg]  

eCW1 ([facility])

 

           Systolic blood pressure 108 mm[Hg]                       108 mm[Hg] e

CW1 ([facility])

 

           Body temperature 97.7 [degF]                       97.7 [degF] eCW1 (

[facility])

 

           Respiratory rate 18 /min                          18 /min    eCW1 ([facility] [facility])

 

           Heart rate 84 /min                          84 /min    eCW1 ([facility])

 

           Body mass index (BMI) [Ratio] 27.63 kg/m2                       27.63

 kg/m2 W1 ([facility])

 

           Body height 64 [in_i]                        64 [in_i]  eCW1 ([facility])

 

           Body weight 161 [lb_av]                       161 [lb_av] eCW1 ([facility])

 

           Body mass index (BMI) [Ratio] 26.6 kg/m2                       26.6 k

g/m2 MEDENT ([facility], [facility])

 

           Body height 64 [in_i]                        64 [in_i]  MEDENT ([facility] [place], [facility])

 

                                        5'4" 

 

           Body weight 155.00 [lb_av]                       155.00 [lb_av] MEDEN

T ([facility] [place], 

[facility])

 

           Body temperature 984.0 [degF]                       984.0 [degF] MEDE

NT ([facility] [place], 

[facility])

 

           Oxygen saturation in Arterial blood by Pulse oximetry 99 %           

                  99 %       MEDENT 

([facility] [place], [facility])

 

           Respiratory rate 16 /min                          16 /min    MEDENT (

[facility] [place], [facility])

 

           Heart rate 69 /min                          69 /min    MEDENT ([facility] [place], [facility])

 

           Diastolic blood pressure 80 mm[Hg]                        80 mm[Hg]  

MEDENT ([facility] [place], 

[facility])

 

           Systolic blood pressure 117 mm[Hg]                       117 mm[Hg] M

EDENT ([facility] [place],

 [facility])

 

           Diastolic blood pressure 70 mm[Hg]                        70 mm[Hg]  

eCW1 ([facility])

 

           Systolic blood pressure 122 mm[Hg]                       122 mm[Hg] e

CW1 ([facility])

 

           Body mass index (BMI) [Ratio] 26.6 kg/m2                       26.6 k

g/m2 W1 ([facility])

 

           Body height 64 [in_us]                       64 [in_us] eCW1 ([facility])

 

           Body weight Measured 155 [lb_av]                       155 [lb_av] eC

W1 ([facility])

 

           Body mass index (BMI) [Ratio] 24.5 kg/m2                       24.5 k

g[facility]m2 [facility], [facility])

 

           Body height 64 [in_i]                        64 [in_i]  [facility] ([facility], [facility])

 

                                        5'4" 

 

           Body weight 143.00 [lb_av]                       143.00 [lb_av] MEDEN

T ([facility], 

[facility])

 

           Body temperature 97.9 [degF]                       97.9 [degF] [facility]

 ([facility], 

[facility])

 

           Oxygen saturation in Arterial blood by Pulse oximetry 98 %           

                  98 %       [facility] 

([facility], [facility])

 

           Respiratory rate 16 /min                          16 /min    [facility] (

[facility], [facility])

 

           Heart rate 73 /min                          73 /min    [facility] ([facility], [facility])

 

           Diastolic blood pressure 83 mm[Hg]                        83 mm[Hg]  

[facility]

 

           Systolic blood pressure 119 mm[Hg]                       119 mm[Hg] M

[facility]



                                                                                
                  



Patient Treatment Plan of Care

          



             Planned Activity Planned Date Details      Description  Data Source

(s)

 

             Levothyroxine Sodium 0.1 MG Oral Tablet 2021 12:00:00 AM EST 

                          eCW1 

([facility])

 

             Levothyroxine Sodium 0.1 MG Oral Tablet 2021 12:00:00 AM EST 

                          eCW1 

([facility]) Migraine equivalent

## 2021-03-01 ENCOUNTER — APPOINTMENT (OUTPATIENT)
Dept: ENDOCRINOLOGY | Facility: CLINIC | Age: 31
End: 2021-03-01

## 2021-03-04 ENCOUNTER — TRANSCRIPTION ENCOUNTER (OUTPATIENT)
Age: 31
End: 2021-03-04

## 2021-03-09 NOTE — ED PROVIDER NOTE - CPE EDP ENMT NORM
[FreeTextEntry1] : Attending:\par Agree with NP Roland above\par s/p long admission for covid, decannulated and de-pegged.\par Continues to slowly improve\par Still with HA, posterior, respond well to tylenol\par 02 -- on 2lpm, stable. titrate down slowly as tolerated\par She stopped the klonpin and remeron on her own.\par Doing home PT\par Wound care following for leg wound\par f/u next week normal...

## 2021-03-11 ENCOUNTER — LABORATORY RESULT (OUTPATIENT)
Age: 31
End: 2021-03-11

## 2021-03-11 ENCOUNTER — APPOINTMENT (OUTPATIENT)
Dept: RHEUMATOLOGY | Facility: CLINIC | Age: 31
End: 2021-03-11
Payer: COMMERCIAL

## 2021-03-11 PROCEDURE — 99072 ADDL SUPL MATRL&STAF TM PHE: CPT

## 2021-03-11 PROCEDURE — 36415 COLL VENOUS BLD VENIPUNCTURE: CPT

## 2021-03-11 PROCEDURE — 96413 CHEMO IV INFUSION 1 HR: CPT

## 2021-03-25 ENCOUNTER — TRANSCRIPTION ENCOUNTER (OUTPATIENT)
Age: 31
End: 2021-03-25

## 2021-04-03 ENCOUNTER — EMERGENCY (EMERGENCY)
Facility: HOSPITAL | Age: 31
LOS: 0 days | Discharge: HOME | End: 2021-04-03
Attending: EMERGENCY MEDICINE
Payer: COMMERCIAL

## 2021-04-03 VITALS
TEMPERATURE: 98 F | DIASTOLIC BLOOD PRESSURE: 74 MMHG | OXYGEN SATURATION: 96 % | WEIGHT: 210.1 LBS | SYSTOLIC BLOOD PRESSURE: 114 MMHG | HEIGHT: 67 IN | RESPIRATION RATE: 16 BRPM | HEART RATE: 83 BPM

## 2021-04-03 VITALS
DIASTOLIC BLOOD PRESSURE: 57 MMHG | HEART RATE: 74 BPM | OXYGEN SATURATION: 100 % | TEMPERATURE: 98 F | SYSTOLIC BLOOD PRESSURE: 119 MMHG | RESPIRATION RATE: 16 BRPM

## 2021-04-03 DIAGNOSIS — J43.9 EMPHYSEMA, UNSPECIFIED: ICD-10-CM

## 2021-04-03 DIAGNOSIS — M08.3 JUVENILE RHEUMATOID POLYARTHRITIS (SERONEGATIVE): ICD-10-CM

## 2021-04-03 DIAGNOSIS — M35.9 SYSTEMIC INVOLVEMENT OF CONNECTIVE TISSUE, UNSPECIFIED: ICD-10-CM

## 2021-04-03 DIAGNOSIS — J45.909 UNSPECIFIED ASTHMA, UNCOMPLICATED: ICD-10-CM

## 2021-04-03 DIAGNOSIS — Z88.5 ALLERGY STATUS TO NARCOTIC AGENT: ICD-10-CM

## 2021-04-03 DIAGNOSIS — Z91.030 BEE ALLERGY STATUS: ICD-10-CM

## 2021-04-03 DIAGNOSIS — M32.9 SYSTEMIC LUPUS ERYTHEMATOSUS, UNSPECIFIED: ICD-10-CM

## 2021-04-03 DIAGNOSIS — Z33.2 ENCOUNTER FOR ELECTIVE TERMINATION OF PREGNANCY: Chronic | ICD-10-CM

## 2021-04-03 DIAGNOSIS — R10.2 PELVIC AND PERINEAL PAIN: ICD-10-CM

## 2021-04-03 DIAGNOSIS — D64.9 ANEMIA, UNSPECIFIED: ICD-10-CM

## 2021-04-03 DIAGNOSIS — R06.9 UNSPECIFIED ABNORMALITIES OF BREATHING: ICD-10-CM

## 2021-04-03 DIAGNOSIS — Z98.89 OTHER SPECIFIED POSTPROCEDURAL STATES: Chronic | ICD-10-CM

## 2021-04-03 DIAGNOSIS — Z88.0 ALLERGY STATUS TO PENICILLIN: ICD-10-CM

## 2021-04-03 DIAGNOSIS — R10.32 LEFT LOWER QUADRANT PAIN: ICD-10-CM

## 2021-04-03 LAB
ALBUMIN SERPL ELPH-MCNC: 3.5 G/DL — SIGNIFICANT CHANGE UP (ref 3.3–5)
ALP SERPL-CCNC: 79 U/L — SIGNIFICANT CHANGE UP (ref 40–120)
ALT FLD-CCNC: 21 U/L — SIGNIFICANT CHANGE UP (ref 12–78)
ANION GAP SERPL CALC-SCNC: 3 MMOL/L — LOW (ref 5–17)
APPEARANCE UR: CLEAR — SIGNIFICANT CHANGE UP
AST SERPL-CCNC: 12 U/L — LOW (ref 15–37)
BASOPHILS # BLD AUTO: 0 K/UL — SIGNIFICANT CHANGE UP (ref 0–0.2)
BASOPHILS NFR BLD AUTO: 0 % — SIGNIFICANT CHANGE UP (ref 0–2)
BILIRUB SERPL-MCNC: 0.3 MG/DL — SIGNIFICANT CHANGE UP (ref 0.2–1.2)
BILIRUB UR-MCNC: NEGATIVE — SIGNIFICANT CHANGE UP
BUN SERPL-MCNC: 16 MG/DL — SIGNIFICANT CHANGE UP (ref 7–23)
CALCIUM SERPL-MCNC: 8.6 MG/DL — SIGNIFICANT CHANGE UP (ref 8.5–10.1)
CHLORIDE SERPL-SCNC: 106 MMOL/L — SIGNIFICANT CHANGE UP (ref 96–108)
CO2 SERPL-SCNC: 28 MMOL/L — SIGNIFICANT CHANGE UP (ref 22–31)
COLOR SPEC: YELLOW — SIGNIFICANT CHANGE UP
CREAT SERPL-MCNC: 0.58 MG/DL — SIGNIFICANT CHANGE UP (ref 0.5–1.3)
DIFF PNL FLD: NEGATIVE — SIGNIFICANT CHANGE UP
EOSINOPHIL # BLD AUTO: 0.15 K/UL — SIGNIFICANT CHANGE UP (ref 0–0.5)
EOSINOPHIL NFR BLD AUTO: 2 % — SIGNIFICANT CHANGE UP (ref 0–6)
GLUCOSE SERPL-MCNC: 101 MG/DL — HIGH (ref 70–99)
GLUCOSE UR QL: NEGATIVE MG/DL — SIGNIFICANT CHANGE UP
HCG SERPL-ACNC: <1 MIU/ML — SIGNIFICANT CHANGE UP
HCT VFR BLD CALC: 39.3 % — SIGNIFICANT CHANGE UP (ref 34.5–45)
HGB BLD-MCNC: 12.7 G/DL — SIGNIFICANT CHANGE UP (ref 11.5–15.5)
KETONES UR-MCNC: NEGATIVE — SIGNIFICANT CHANGE UP
LEUKOCYTE ESTERASE UR-ACNC: NEGATIVE — SIGNIFICANT CHANGE UP
LIDOCAIN IGE QN: 66 U/L — LOW (ref 73–393)
LYMPHOCYTES # BLD AUTO: 3.75 K/UL — HIGH (ref 1–3.3)
LYMPHOCYTES # BLD AUTO: 50 % — HIGH (ref 13–44)
MCHC RBC-ENTMCNC: 28.4 PG — SIGNIFICANT CHANGE UP (ref 27–34)
MCHC RBC-ENTMCNC: 32.3 GM/DL — SIGNIFICANT CHANGE UP (ref 32–36)
MCV RBC AUTO: 87.9 FL — SIGNIFICANT CHANGE UP (ref 80–100)
MONOCYTES # BLD AUTO: 0.45 K/UL — SIGNIFICANT CHANGE UP (ref 0–0.9)
MONOCYTES NFR BLD AUTO: 6 % — SIGNIFICANT CHANGE UP (ref 2–14)
NEUTROPHILS # BLD AUTO: 3.15 K/UL — SIGNIFICANT CHANGE UP (ref 1.8–7.4)
NEUTROPHILS NFR BLD AUTO: 41 % — LOW (ref 43–77)
NITRITE UR-MCNC: NEGATIVE — SIGNIFICANT CHANGE UP
NRBC # BLD: SIGNIFICANT CHANGE UP /100 WBCS (ref 0–0)
PCP SPEC-MCNC: SIGNIFICANT CHANGE UP
PH UR: 5 — SIGNIFICANT CHANGE UP (ref 5–8)
PLATELET # BLD AUTO: 414 K/UL — HIGH (ref 150–400)
POTASSIUM SERPL-MCNC: 4.1 MMOL/L — SIGNIFICANT CHANGE UP (ref 3.5–5.3)
POTASSIUM SERPL-SCNC: 4.1 MMOL/L — SIGNIFICANT CHANGE UP (ref 3.5–5.3)
PROT SERPL-MCNC: 7.6 GM/DL — SIGNIFICANT CHANGE UP (ref 6–8.3)
PROT UR-MCNC: NEGATIVE MG/DL — SIGNIFICANT CHANGE UP
RBC # BLD: 4.47 M/UL — SIGNIFICANT CHANGE UP (ref 3.8–5.2)
RBC # FLD: 15 % — HIGH (ref 10.3–14.5)
SODIUM SERPL-SCNC: 137 MMOL/L — SIGNIFICANT CHANGE UP (ref 135–145)
SP GR SPEC: 1.02 — SIGNIFICANT CHANGE UP (ref 1.01–1.02)
UROBILINOGEN FLD QL: NEGATIVE MG/DL — SIGNIFICANT CHANGE UP
WBC # BLD: 7.5 K/UL — SIGNIFICANT CHANGE UP (ref 3.8–10.5)
WBC # FLD AUTO: 7.5 K/UL — SIGNIFICANT CHANGE UP (ref 3.8–10.5)

## 2021-04-03 PROCEDURE — 99284 EMERGENCY DEPT VISIT MOD MDM: CPT | Mod: 25

## 2021-04-03 PROCEDURE — 76856 US EXAM PELVIC COMPLETE: CPT

## 2021-04-03 PROCEDURE — 99285 EMERGENCY DEPT VISIT HI MDM: CPT

## 2021-04-03 PROCEDURE — 96374 THER/PROPH/DIAG INJ IV PUSH: CPT | Mod: XU

## 2021-04-03 PROCEDURE — 84702 CHORIONIC GONADOTROPIN TEST: CPT

## 2021-04-03 PROCEDURE — 87086 URINE CULTURE/COLONY COUNT: CPT

## 2021-04-03 PROCEDURE — 76856 US EXAM PELVIC COMPLETE: CPT | Mod: 26

## 2021-04-03 PROCEDURE — 80307 DRUG TEST PRSMV CHEM ANLYZR: CPT

## 2021-04-03 PROCEDURE — 81003 URINALYSIS AUTO W/O SCOPE: CPT

## 2021-04-03 PROCEDURE — 85025 COMPLETE CBC W/AUTO DIFF WBC: CPT

## 2021-04-03 PROCEDURE — 83690 ASSAY OF LIPASE: CPT

## 2021-04-03 PROCEDURE — 74177 CT ABD & PELVIS W/CONTRAST: CPT | Mod: 26

## 2021-04-03 PROCEDURE — 74177 CT ABD & PELVIS W/CONTRAST: CPT

## 2021-04-03 PROCEDURE — 36415 COLL VENOUS BLD VENIPUNCTURE: CPT

## 2021-04-03 PROCEDURE — 80053 COMPREHEN METABOLIC PANEL: CPT

## 2021-04-03 RX ORDER — KETOROLAC TROMETHAMINE 30 MG/ML
30 SYRINGE (ML) INJECTION ONCE
Refills: 0 | Status: DISCONTINUED | OUTPATIENT
Start: 2021-04-03 | End: 2021-04-03

## 2021-04-03 RX ORDER — LIDOCAINE 4 G/100G
1 CREAM TOPICAL ONCE
Refills: 0 | Status: COMPLETED | OUTPATIENT
Start: 2021-04-03 | End: 2021-04-03

## 2021-04-03 RX ORDER — SODIUM CHLORIDE 9 MG/ML
1000 INJECTION INTRAMUSCULAR; INTRAVENOUS; SUBCUTANEOUS ONCE
Refills: 0 | Status: COMPLETED | OUTPATIENT
Start: 2021-04-03 | End: 2021-04-03

## 2021-04-03 RX ADMIN — Medication 30 MILLIGRAM(S): at 06:47

## 2021-04-03 RX ADMIN — SODIUM CHLORIDE 1000 MILLILITER(S): 9 INJECTION INTRAMUSCULAR; INTRAVENOUS; SUBCUTANEOUS at 05:53

## 2021-04-03 RX ADMIN — LIDOCAINE 1 PATCH: 4 CREAM TOPICAL at 05:53

## 2021-04-03 NOTE — ED PROVIDER NOTE - PROGRESS NOTE DETAILS
Candice HOWARD: Received s/o from Dr. Muller pending CT scan; CT with no acute pathology; all results discussed with patient at bedside; results given to patient and aware; patient with appt with private ob on Tuesday; motrin/tylenol prn pain; strict return precautions given.

## 2021-04-03 NOTE — ED PROVIDER NOTE - PATIENT PORTAL LINK FT
You can access the FollowMyHealth Patient Portal offered by [facility] by registering at the following website: [email or web address]. By joining [facility]’s FollowMyHealth portal, you will also be able to view your health information using other applications (apps) compatible with our system.

## 2021-04-03 NOTE — ED PROVIDER NOTE - OBJECTIVE STATEMENT
30 y/o female in ED c/o sudden onset of LLQ pain x 1 hr that awoke her from sleep.   no meds taken for pain.   pt states just received 2nd round of abx for BV treatment.   pt denies any fever, HA, cp, sob, n/v/d.   tolerating PO.   no sick contacts or recent travel.

## 2021-04-03 NOTE — ED ADULT TRIAGE NOTE - CHIEF COMPLAINT QUOTE
Patient presents to the ED co 10/10 pelvic pain which woke her up out of her sleep.  Pt states she is being treated for bacterial vaginosis for the second time with fluconazole and flagyl. No bleeding, + discharge as per pt

## 2021-04-03 NOTE — ED ADULT NURSE REASSESSMENT NOTE - NS ED NURSE REASSESS COMMENT FT1
report received from MIROSLAVA Pierson. no other needs at this time. patient ambulated to bathroom with assistance. urine obtained and sent. will continue to monitor for safety and comfort.

## 2021-04-04 LAB
CULTURE RESULTS: SIGNIFICANT CHANGE UP
SPECIMEN SOURCE: SIGNIFICANT CHANGE UP

## 2021-04-06 ENCOUNTER — APPOINTMENT (OUTPATIENT)
Dept: GASTROENTEROLOGY | Facility: CLINIC | Age: 31
End: 2021-04-06
Payer: COMMERCIAL

## 2021-04-06 ENCOUNTER — APPOINTMENT (OUTPATIENT)
Dept: OBGYN | Facility: CLINIC | Age: 31
End: 2021-04-06
Payer: COMMERCIAL

## 2021-04-06 VITALS
SYSTOLIC BLOOD PRESSURE: 140 MMHG | HEIGHT: 67 IN | HEART RATE: 93 BPM | BODY MASS INDEX: 36.88 KG/M2 | WEIGHT: 235 LBS | TEMPERATURE: 98 F | DIASTOLIC BLOOD PRESSURE: 96 MMHG

## 2021-04-06 VITALS
WEIGHT: 235 LBS | HEIGHT: 67 IN | BODY MASS INDEX: 36.88 KG/M2 | TEMPERATURE: 96.8 F | DIASTOLIC BLOOD PRESSURE: 78 MMHG | SYSTOLIC BLOOD PRESSURE: 120 MMHG

## 2021-04-06 DIAGNOSIS — B96.89 ACUTE VAGINITIS: ICD-10-CM

## 2021-04-06 DIAGNOSIS — K62.5 HEMORRHAGE OF ANUS AND RECTUM: ICD-10-CM

## 2021-04-06 DIAGNOSIS — K59.00 CONSTIPATION, UNSPECIFIED: ICD-10-CM

## 2021-04-06 DIAGNOSIS — N76.0 ACUTE VAGINITIS: ICD-10-CM

## 2021-04-06 PROCEDURE — 99072 ADDL SUPL MATRL&STAF TM PHE: CPT

## 2021-04-06 PROCEDURE — 58301 REMOVE INTRAUTERINE DEVICE: CPT

## 2021-04-06 PROCEDURE — 99214 OFFICE O/P EST MOD 30 MIN: CPT | Mod: 25

## 2021-04-06 RX ORDER — HYDROCORTISONE ACETATE 25 MG/1
25 SUPPOSITORY RECTAL
Qty: 30 | Refills: 3 | Status: ACTIVE | COMMUNITY
Start: 2021-04-06 | End: 1900-01-01

## 2021-04-06 RX ORDER — SODIUM SULFATE, MAGNESIUM SULFATE, AND POTASSIUM CHLORIDE 17.75; 2.7; 2.25 G/1; G/1; G/1
1479-225-188 TABLET ORAL
Qty: 1 | Refills: 0 | Status: ACTIVE | COMMUNITY
Start: 2021-04-06 | End: 1900-01-01

## 2021-04-06 RX ORDER — NORETHINDRONE 0.35 MG/1
0.35 TABLET ORAL DAILY
Qty: 1 | Refills: 6 | Status: ACTIVE | COMMUNITY
Start: 2021-04-06 | End: 1900-01-01

## 2021-04-06 RX ORDER — PANTOPRAZOLE 40 MG/1
40 TABLET, DELAYED RELEASE ORAL DAILY
Qty: 90 | Refills: 3 | Status: ACTIVE | COMMUNITY
Start: 2021-04-06 | End: 1900-01-01

## 2021-04-06 RX ORDER — SODIUM SULFATE, POTASSIUM SULFATE, MAGNESIUM SULFATE 17.5; 3.13; 1.6 G/ML; G/ML; G/ML
17.5-3.13-1.6 SOLUTION, CONCENTRATE ORAL
Qty: 1 | Refills: 0 | Status: ACTIVE | COMMUNITY
Start: 2021-04-06 | End: 1900-01-01

## 2021-04-06 NOTE — HISTORY OF PRESENT ILLNESS
[FreeTextEntry1] : Patient with recurrent BV\par went to urgent care and got MetroGel and Diflucan\par She has an IUD and sees her fiance every 2 months and then gets BV\par She is currently complaining of left sided pain\par No fever

## 2021-04-06 NOTE — REVIEW OF SYSTEMS
[As Noted in HPI] : as noted in HPI [Constipation] : constipation [Negative] : Heme/Lymph [FreeTextEntry7] : BRBPR

## 2021-04-06 NOTE — PLAN
[FreeTextEntry1] : I did not culture patient as she just finished MetroGel yesterday\par Discuss IUD as possible causative agent of BV \par Patient would like removal and understands that she needs alternative birth control\par Will Rx mini pill as patient is treated for MCTD and will check with her rheumatologist

## 2021-04-06 NOTE — PROCEDURE
[IUD Removal] : intrauterine device (IUD) removal [Time out performed] : Pre-procedure time out performed.  Patient's name, date of birth and procedure confirmed. [Consent Obtained] : Consent obtained [Infection] : infection [Risks] : risks [Benefits] : benefits [Alternatives] : alternatives [Patient] : patient [IUD Discarded] : IUD discarded [Sent to Pathology] : specimen was placed in buffered formalin and sent for pathology [Tolerated Well] : Patient tolerated the procedure well [No Complications] : no complications [Heavy Vaginal Bleeding] : for heavy vaginal bleeding [Pelvic Pain] : for pelvic pain

## 2021-04-06 NOTE — ASSESSMENT
[FreeTextEntry1] : 30yo female hx of x3 natural deliveries, vaginal prolapse, BV s/p removal of IUD, chronic constipation requiring her to apply pressure to her perineum to move her bowels.  \par \par Reports being more constipated then normal and is very frustrated.  Now with BRBPR in stools for the past few days.  Describes as a popping feeling with bowel movement.  Also with some indigestion after mealtime.\par \par BRBPR likely due to internal hemorrhoids 2/2 constipation.\par Indigestion\par \par Plan:\par Arrange egd and colonoscopy with Dr. Quintana (Dr. Quintana to review past records about ?itching after sedation). Aisha to send message to him. \par \par PPI and hydro. supp. for likely internal hemorrhoids. \par \par Discussed with Dr. Osorio.

## 2021-04-06 NOTE — PHYSICAL EXAM
[General Appearance - Alert] : alert [General Appearance - In No Acute Distress] : in no acute distress [Neck Appearance] : the appearance of the neck was normal [Neck Cervical Mass (___cm)] : no neck mass was observed [Jugular Venous Distention Increased] : there was no jugular-venous distention [Thyroid Diffuse Enlargement] : the thyroid was not enlarged [Thyroid Nodule] : there were no palpable thyroid nodules [Auscultation Breath Sounds / Voice Sounds] : lungs were clear to auscultation bilaterally [Heart Rate And Rhythm] : heart rate was normal and rhythm regular [Heart Sounds] : normal S1 and S2 [Heart Sounds Gallop] : no gallops [Murmurs] : no murmurs [Heart Sounds Pericardial Friction Rub] : no pericardial rub [Bowel Sounds] : normal bowel sounds [Abdomen Soft] : soft [Abdomen Tenderness] : non-tender [] : no hepato-splenomegaly [Abdomen Mass (___ Cm)] : no abdominal mass palpated [Normal Sphincter Tone] : normal sphincter tone [No Rectal Mass] : no rectal mass [Internal Hemorrhoid] : internal hemorrhoids [Abnormal Walk] : normal gait [Nail Clubbing] : no clubbing  or cyanosis of the fingernails [Musculoskeletal - Swelling] : no joint swelling seen [Motor Tone] : muscle strength and tone were normal [Oriented To Time, Place, And Person] : oriented to person, place, and time [Impaired Insight] : insight and judgment were intact [Affect] : the affect was normal [Occult Blood Positive] : stool was negative for occult blood [FreeTextEntry1] : no overt gi bleeding.

## 2021-04-06 NOTE — PHYSICAL EXAM
[Labia Majora] : normal [Labia Minora] : normal [IUD String] : an IUD string was noted [Normal] : normal [Uterine Adnexae] : normal [FreeTextEntry4] : filled with MetroGel

## 2021-04-07 RX ORDER — POLYETHYLENE GLYCOL-3350, SODIUM CHLORIDE, POTASSIUM CHLORIDE AND SODIUM BICARBONATE 420; 11.2; 5.72; 1.48 G/438.4G; G/438.4G; G/438.4G; G/438.4G
420 POWDER, FOR SOLUTION ORAL
Qty: 1 | Refills: 0 | Status: ACTIVE | COMMUNITY
Start: 2021-04-07 | End: 1900-01-01

## 2021-04-07 RX ORDER — SODIUM SULFATE, MAGNESIUM SULFATE, AND POTASSIUM CHLORIDE 17.75; 2.7; 2.25 G/1; G/1; G/1
1479-225-188 TABLET ORAL
Qty: 1 | Refills: 0 | Status: ACTIVE | COMMUNITY
Start: 2021-04-07 | End: 1900-01-01

## 2021-04-09 ENCOUNTER — TRANSCRIPTION ENCOUNTER (OUTPATIENT)
Age: 31
End: 2021-04-09

## 2021-04-12 ENCOUNTER — APPOINTMENT (OUTPATIENT)
Dept: OBGYN | Facility: CLINIC | Age: 31
End: 2021-04-12

## 2021-04-12 ENCOUNTER — TRANSCRIPTION ENCOUNTER (OUTPATIENT)
Age: 31
End: 2021-04-12

## 2021-04-12 RX ORDER — PRAMOXINE HYDROCHLORIDE HYDROCORTISONE ACETATE 100; 100 MG/10G; MG/10G
1-1 AEROSOL, FOAM TOPICAL
Qty: 10 | Refills: 3 | Status: ACTIVE | COMMUNITY
Start: 2021-04-12 | End: 1900-01-01

## 2021-04-13 ENCOUNTER — TRANSCRIPTION ENCOUNTER (OUTPATIENT)
Age: 31
End: 2021-04-13

## 2021-04-14 ENCOUNTER — LABORATORY RESULT (OUTPATIENT)
Age: 31
End: 2021-04-14

## 2021-04-14 DIAGNOSIS — R10.9 UNSPECIFIED ABDOMINAL PAIN: ICD-10-CM

## 2021-04-14 RX ORDER — METRONIDAZOLE 7.5 MG/G
0.75 GEL VAGINAL
Qty: 1 | Refills: 0 | Status: ACTIVE | COMMUNITY
Start: 2021-04-14 | End: 1900-01-01

## 2021-04-16 ENCOUNTER — RESULT REVIEW (OUTPATIENT)
Age: 31
End: 2021-04-16

## 2021-04-16 ENCOUNTER — APPOINTMENT (OUTPATIENT)
Dept: GASTROENTEROLOGY | Facility: AMBULATORY MEDICAL SERVICES | Age: 31
End: 2021-04-16
Payer: COMMERCIAL

## 2021-04-16 DIAGNOSIS — K29.60 OTHER GASTRITIS W/OUT BLEEDING: ICD-10-CM

## 2021-04-16 LAB
ALBUMIN SERPL ELPH-MCNC: 4.3 G/DL
ALP BLD-CCNC: 88 U/L
ALT SERPL-CCNC: 14 U/L
ANION GAP SERPL CALC-SCNC: 10 MMOL/L
AST SERPL-CCNC: 13 U/L
BASOPHILS # BLD AUTO: 0.04 K/UL
BASOPHILS NFR BLD AUTO: 0.7 %
BILIRUB SERPL-MCNC: 0.4 MG/DL
BUN SERPL-MCNC: 10 MG/DL
CALCIUM SERPL-MCNC: 9.3 MG/DL
CHLORIDE SERPL-SCNC: 101 MMOL/L
CO2 SERPL-SCNC: 26 MMOL/L
CREAT SERPL-MCNC: 0.6 MG/DL
EOSINOPHIL # BLD AUTO: 0.21 K/UL
EOSINOPHIL NFR BLD AUTO: 3.8 %
GLUCOSE SERPL-MCNC: 94 MG/DL
HCT VFR BLD CALC: 41.4 %
HGB BLD-MCNC: 13.3 G/DL
IMM GRANULOCYTES NFR BLD AUTO: 0 %
LYMPHOCYTES # BLD AUTO: 2.96 K/UL
LYMPHOCYTES NFR BLD AUTO: 53.1 %
MAN DIFF?: NORMAL
MCHC RBC-ENTMCNC: 28.7 PG
MCHC RBC-ENTMCNC: 32.1 GM/DL
MCV RBC AUTO: 89.2 FL
MONOCYTES # BLD AUTO: 0.47 K/UL
MONOCYTES NFR BLD AUTO: 8.4 %
NEUTROPHILS # BLD AUTO: 1.89 K/UL
NEUTROPHILS NFR BLD AUTO: 34 %
PLATELET # BLD AUTO: 402 K/UL
POTASSIUM SERPL-SCNC: 4.3 MMOL/L
PROT SERPL-MCNC: 7.1 G/DL
RBC # BLD: 4.64 M/UL
RBC # FLD: 15.4 %
SODIUM SERPL-SCNC: 136 MMOL/L
WBC # FLD AUTO: 5.57 K/UL

## 2021-04-16 PROCEDURE — 43239 EGD BIOPSY SINGLE/MULTIPLE: CPT

## 2021-04-16 PROCEDURE — 45378 DIAGNOSTIC COLONOSCOPY: CPT

## 2021-04-16 RX ORDER — SUCRALFATE 1 G/10ML
1 SUSPENSION ORAL
Qty: 200 | Refills: 2 | Status: ACTIVE | COMMUNITY
Start: 2021-04-16 | End: 1900-01-01

## 2021-04-18 ENCOUNTER — APPOINTMENT (OUTPATIENT)
Dept: DISASTER EMERGENCY | Facility: CLINIC | Age: 31
End: 2021-04-18

## 2021-04-18 DIAGNOSIS — Z01.818 ENCOUNTER FOR OTHER PREPROCEDURAL EXAMINATION: ICD-10-CM

## 2021-04-20 ENCOUNTER — TRANSCRIPTION ENCOUNTER (OUTPATIENT)
Age: 31
End: 2021-04-20

## 2021-04-20 DIAGNOSIS — K58.0 IRRITABLE BOWEL SYNDROME WITH DIARRHEA: ICD-10-CM

## 2021-04-20 RX ORDER — RIFAXIMIN 550 MG/1
550 TABLET ORAL
Qty: 42 | Refills: 2 | Status: ACTIVE | COMMUNITY
Start: 2021-04-20 | End: 1900-01-01

## 2021-04-21 ENCOUNTER — APPOINTMENT (OUTPATIENT)
Dept: PAIN MANAGEMENT | Facility: CLINIC | Age: 31
End: 2021-04-21
Payer: COMMERCIAL

## 2021-04-21 VITALS
HEIGHT: 67 IN | BODY MASS INDEX: 36.1 KG/M2 | DIASTOLIC BLOOD PRESSURE: 75 MMHG | WEIGHT: 230 LBS | SYSTOLIC BLOOD PRESSURE: 114 MMHG | HEART RATE: 74 BPM

## 2021-04-21 VITALS — TEMPERATURE: 97.6 F

## 2021-04-21 PROCEDURE — 99212 OFFICE O/P EST SF 10 MIN: CPT

## 2021-04-21 PROCEDURE — 99072 ADDL SUPL MATRL&STAF TM PHE: CPT

## 2021-04-21 RX ORDER — PREDNISONE 20 MG/1
20 TABLET ORAL
Qty: 10 | Refills: 0 | Status: DISCONTINUED | COMMUNITY
Start: 2020-12-17 | End: 2021-04-21

## 2021-04-21 RX ORDER — FROVATRIPTAN SUCCINATE 2.5 MG/1
2.5 TABLET, FILM COATED ORAL
Qty: 8 | Refills: 3 | Status: ACTIVE | COMMUNITY
Start: 2021-04-21 | End: 1900-01-01

## 2021-04-21 NOTE — HISTORY OF PRESENT ILLNESS
[FreeTextEntry1] : Pt returns today for a followup visit. Explains she has had a migraine for over 1 week , she had a colonoscopy and endoscopy at the end of last week and then a COVID vaccine on Sunday. \par Explains she has taken Naratriptan and has not had relief. \par The endoscopy showed erosive gastritis with bleed and pt was told to avoid NSAIDS.\par She is taking Zofran for nausea and vomiting. \par  \par  [Headache] : headache [Dizziness] : dizziness [Nausea] : nausea [Vomiting] : Vomiting [Photophobia] : photophobia [Daily] : daily

## 2021-04-21 NOTE — REVIEW OF SYSTEMS
[Fever] : no fever [Chills] : no chills [Chest Pain] : no chest pain [Palpitations] : no palpitations [Abdominal Pain] : no abdominal pain [Vomiting] : vomiting

## 2021-04-21 NOTE — PHYSICAL EXAM
[General Appearance - Alert] : alert [General Appearance - Well-Appearing] : healthy appearing [Oriented To Time, Place, And Person] : oriented to person, place, and time [Mood] : the mood was normal [Affect] : the affect was normal [Cranial Nerves Facial (VII)] : face symmetrical [Cranial Nerves Accessory (XI - Cranial And Spinal)] : head turning and shoulder shrug symmetric [Cranial Nerves Hypoglossal (XII)] : there was no tongue deviation with protrusion [Motor Strength] : muscle strength was normal in all four extremities [Paresis Pronator Drift Right-Sided] : no pronator drift on the right [Paresis Pronator Drift Left-Sided] : no pronator drift on the left [Motor Strength Upper Extremities Bilaterally] : strength was normal in both upper extremities [Motor Strength Lower Extremities Bilaterally] : strength was normal in both lower extremities [Coordination - Dysmetria Impaired Finger-to-Nose Bilateral] : not present [Sclera] : the sclera and conjunctiva were normal [PERRL With Normal Accommodation] : pupils were equal in size, round, reactive to light, with normal accommodation [Extraocular Movements] : extraocular movements were intact [] : no respiratory distress

## 2021-04-21 NOTE — ASSESSMENT
[FreeTextEntry1] : trial of Nurtec - coupon card given.\par If unable to attain Nurtec then Frovatriptan bridge ( for 4 days ) \par Continue Zofran.

## 2021-04-22 ENCOUNTER — APPOINTMENT (OUTPATIENT)
Dept: GASTROENTEROLOGY | Facility: AMBULATORY MEDICAL SERVICES | Age: 31
End: 2021-04-22

## 2021-04-22 ENCOUNTER — TRANSCRIPTION ENCOUNTER (OUTPATIENT)
Age: 31
End: 2021-04-22

## 2021-04-22 RX ORDER — RIMEGEPANT SULFATE 75 MG/75MG
75 TABLET, ORALLY DISINTEGRATING ORAL
Qty: 2 | Refills: 3 | Status: ACTIVE | COMMUNITY
Start: 2020-07-10 | End: 1900-01-01

## 2021-04-26 ENCOUNTER — TRANSCRIPTION ENCOUNTER (OUTPATIENT)
Age: 31
End: 2021-04-26

## 2021-04-26 RX ORDER — HYOSCYAMINE SULFATE 0.38 MG/1
0.38 TABLET, EXTENDED RELEASE ORAL
Qty: 60 | Refills: 3 | Status: ACTIVE | COMMUNITY
Start: 2021-04-26 | End: 1900-01-01

## 2021-05-06 ENCOUNTER — APPOINTMENT (OUTPATIENT)
Dept: RHEUMATOLOGY | Facility: CLINIC | Age: 31
End: 2021-05-06

## 2021-05-07 ENCOUNTER — TRANSCRIPTION ENCOUNTER (OUTPATIENT)
Age: 31
End: 2021-05-07

## 2021-05-07 ENCOUNTER — APPOINTMENT (OUTPATIENT)
Dept: CT IMAGING | Facility: CLINIC | Age: 31
End: 2021-05-07

## 2021-05-07 ENCOUNTER — OUTPATIENT (OUTPATIENT)
Dept: OUTPATIENT SERVICES | Facility: HOSPITAL | Age: 31
LOS: 1 days | End: 2021-05-07

## 2021-05-07 DIAGNOSIS — Z00.8 ENCOUNTER FOR OTHER GENERAL EXAMINATION: ICD-10-CM

## 2021-05-07 DIAGNOSIS — Z33.2 ENCOUNTER FOR ELECTIVE TERMINATION OF PREGNANCY: Chronic | ICD-10-CM

## 2021-05-07 DIAGNOSIS — Z98.89 OTHER SPECIFIED POSTPROCEDURAL STATES: Chronic | ICD-10-CM

## 2021-05-20 ENCOUNTER — APPOINTMENT (OUTPATIENT)
Dept: GASTROENTEROLOGY | Facility: CLINIC | Age: 31
End: 2021-05-20

## 2021-05-20 ENCOUNTER — APPOINTMENT (OUTPATIENT)
Dept: RHEUMATOLOGY | Facility: CLINIC | Age: 31
End: 2021-05-20

## 2021-06-08 ENCOUNTER — RX RENEWAL (OUTPATIENT)
Age: 31
End: 2021-06-08

## 2021-06-08 RX ORDER — ALBUTEROL SULFATE 90 UG/1
108 (90 BASE) INHALANT RESPIRATORY (INHALATION)
Qty: 1 | Refills: 3 | Status: ACTIVE | COMMUNITY
Start: 2021-06-08 | End: 1900-01-01

## 2021-07-01 ENCOUNTER — APPOINTMENT (OUTPATIENT)
Dept: RHEUMATOLOGY | Facility: CLINIC | Age: 31
End: 2021-07-01

## 2021-08-25 NOTE — CONSULT NOTE ADULT - COMMENTS
8/25/2021      Steve Whiting  [address]   [facility] [address]        Dear Mr. Whiting,    This letter is to inform you that your biopsies from your recent colonoscopy are benign (non cancerous).  Please follow up in the clinic as advised. I recommend to return for your next colonoscopy in 5 years for continued surveillance. Please call the [facility] at [phone] if you have any questions or concerns.     Sincerely,        Dr. Omari Barnes  Gastroenterology  [address] [address]  
Vital Signs Last 24 Hrs  T(C): 36.7 (24 Aug 2017 00:30), Max: 37.9 (23 Aug 2017 15:37)  T(F): 98.1 (24 Aug 2017 00:30), Max: 100.3 (23 Aug 2017 15:37)  HR: 88 (24 Aug 2017 00:30) (68 - 103)  BP: 109/70 (24 Aug 2017 00:30) (106/69 - 119/78)  BP(mean): --  RR: 17 (24 Aug 2017 00:30) (17 - 18)  SpO2: 100% (24 Aug 2017 00:30) (99% - 100%)
[X] All other systems negative aside from above.  [  ] ROS unobtainable because:

## 2021-08-31 ENCOUNTER — RX RENEWAL (OUTPATIENT)
Age: 31
End: 2021-08-31

## 2021-08-31 RX ORDER — ALBUTEROL SULFATE 2.5 MG/3ML
(2.5 MG/3ML) SOLUTION RESPIRATORY (INHALATION)
Qty: 300 | Refills: 2 | Status: ACTIVE | COMMUNITY
Start: 2020-01-13 | End: 1900-01-01

## 2021-09-26 NOTE — ED ADULT NURSE NOTE - BREATHING, MLM
Patient s/p fall with subdural hematoma and L hip fx, tx from OSH. Trauma 1 activation.     Primary Survey  A - airway intact  B - bilateral breath sounds and good chest rise  C - initially BP: 129/67 (09-26-21 @ 03:55), palpable pulses in all extremities  D - GCS 15 on arrival  Exposure obtained      Secondary survey  Gen: NAD  HEENT: NC/AT  CV: s1, s2, RRR  Pulm: CTA B/L  Chest: No TTP  Abd: Soft, ND, NT, no rebound, no guarding  Groin: Normal appearing, pain to palpation in left hip, pelvis stable  Ext: Palp radial b/l, palp DP b/l  Back: TTP in thoracic spine  no palpable runoff, stepoff, or deformity   Patient s/p fall at assisted living facility. Initially presented [facility] and was found to have subdural hematoma and L hip fracture. Transferred to [facility] for further work-up.     Primary Survey  A - airway intact  B - bilateral breath sounds and good chest rise  C - initially BP: 129/67 (09-26-21 @ 03:55), palpable pulses in all extremities  D - GCS 14 on arrival  Exposure obtained      Secondary survey  Gen: NAD  HEENT: NC/AT  CV: s1, s2, RRR  Pulm: CTA B/L  Chest: No TTP  Abd: Soft, but distended, no rebound, no guarding  Groin: Normal appearing, pain to palpation in left hip, pelvis stable  Ext: Palp radial b/l, palp DP b/l  Back: TTP in thoracic spine  no palpable runoff, stepoff, or deformity    Patient taken to CT scanner directly from trauma bay.   Spontaneous, unlabored and symmetrical

## 2021-10-19 NOTE — ED ADULT TRIAGE NOTE - ADDITIONAL SAFETY/BANDS...
Additional Safety/Bands: Render Note In Bullet Format When Appropriate: No Duration Of Freeze Thaw-Cycle (Seconds): 7 Consent: The patient's consent was obtained including but not limited to risks of crusting, scabbing, blistering, scarring, darker or lighter pigmentary change, recurrence, incomplete removal and infection. Number Of Freeze-Thaw Cycles: 1 freeze-thaw cycle Post-Care Instructions: I reviewed with the patient in detail post-care instructions. Patient is to wear sunprotection, and avoid picking at any of the treated lesions. Pt may apply Vaseline to crusted or scabbing areas. Render Post-Care Instructions In Note?: yes Detail Level: Zone

## 2021-12-14 NOTE — ED ADULT NURSE NOTE - NS TRANSFER PATIENT BELONGINGS
Clothing Consent 3/Introductory Paragraph: I gave the patient a chance to ask questions they had about the procedure.  Following this I explained the Mohs procedure and consent was obtained. The risks, benefits and alternatives to therapy were discussed in detail. Specifically, the risks of infection, scarring, bleeding, prolonged wound healing, incomplete removal, allergy to anesthesia, nerve injury and recurrence were addressed. Prior to the procedure, the treatment site was clearly identified and confirmed by the patient. All components of Universal Protocol/PAUSE Rule completed.

## 2021-12-22 NOTE — ED STATDOCS - PMH
-- DO NOT REPLY / DO NOT REPLY ALL --  -- Message is from the [facility]--    General Patient Message      Reason for Call: Ivonne is calling today she was told the chapincito for her son is tomorrow and it was for today she's upset she doesn't want to be charge for today's visit please advice     Caller Information       Type Contact Phone    12/22/2021 11:19 AM CST Phone (Incoming) Ivonne Sanchez (Mother) [phone] (M)          Alternative phone number: n/a    Turnaround time given to caller:   \"This message will be sent to [state Provider's name]. The clinical team will fulfill your request as soon as they review your message.\"     Anemia    Asthma  No h/o intubation, dose not use ventolin on daily basis, denies hospitalization due to asthma  Chronic tonsillitis    Emphysema lung    Enlarged lymph nodes  dx: 2014  History of Clostridium difficile infection  2016, not an active infection  Lupus    Migraine    Multigravida    RA (rheumatoid arthritis)    Seronegative arthritis    Undifferentiated connective tissue disease    Vitamin D deficiency

## 2022-02-02 NOTE — ED ADULT NURSE NOTE - DOES PATIENT HAVE ADVANCE DIRECTIVE
Patient informed of the following   \"Uric acid is high, continue all treatments as advised at visit.   Very good improvement in cholesterol but still above goal, increase atorvastatin to 40 mg daily.   Diabetes the same like last time , continue weight loss   Kidney function slightly better than last time, continue controlliing diabetes and other problems\"   patient verbalized understanding.      No

## 2022-05-02 NOTE — ASSESSMENT
[FreeTextEntry1] : 30-year-old female with history of thyroid nodules, here for new patient visit. Was following with Dr. Jane in past, last visit was in 2018.\par \par Thyroid nodules and history of Hashimoto's thyroiditis:\par - Clinically she seems hypothyroid : fatigue, weight gain, constipation.\par - Check TFTs today. Per charts, had positive TPO antibodies in past. \par - Thyroid US from 2/2020 showed decrease in size of left mid pole thyroid nodule 1.1 cm and slight increase in size of right upper pole cyst 0.8 cm. \par - Repeat thyroid US to be done in 2/2021. \par \par Follow up visit in 6 months.\par \par Seen and discussed with Dr. Brock.  Forceps were not used

## 2022-06-29 NOTE — ED ADULT NURSE NOTE - CAS DISCH CONDITION
Recommend ice every 2-3 hours areas of discomfort for approximately 10 minutes for following 3 days.  Tylenol as needed for discomfort.    
Stable

## 2022-07-22 NOTE — ED PROVIDER NOTE - IV ALTEPLASE EXCL REL HIDDEN
Sendy Pacheco is a 82 year old female presenting for   Chief Complaint   Patient presents with   • Follow-up   • Office Visit       Denies Latex allergy or sensitivity.    Medication verified, no changes  Refills needed today: No    Health Maintenance Due   Topic Date Due   • Traditional Medicare- Medicare Wellness Visit  02/26/2022   • COVID-19 Vaccine (4 - Booster for Moderna series) 06/15/2022     Patient is due for topics as listed above but is not proceeding with MWV (Medicare Wellness Visit) at this time.     
Ze presents for a follow up about her memory difficulty. She is accompanied by her daughter Olga.   She continues to live at the [facility].     She state she is so so. She states she wants to go back home.   She was able to go home for a day with her son Mukul. She was there for the afternoon. She was able to set the oven and cook a pizza. However she was not strong enough to cut her pizza.   Ze has been keeping her own calendar date book. 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 She enjoys this.   Patient is quite unhappy where she is living and wants to move back home.   She does not wish to have anyone come live with her were she to move back home.     Exam:  Well developed, well nourished elderly woman in no apparent distress.    Blood pressure 132/72, pulse 84, weight 53 kg (116 lb 12.8 oz), SpO2 98 %.  patient is awake, alert. She expresses her wish to move back home as she feels she does not belong with the other residents in the memory home.     MOCA 19/30.     Impression:  82 year old woman with memory difficulty since having surgery in January 2022. Her cognitive function remains limited as per MOCA test.   Her POA is activated.     Plan:  Will have her see neurologist about her memory.   F/u September 13.             
show

## 2022-11-23 NOTE — PATIENT PROFILE ADULT. - BILL OF RIGHTS/ADMISSION INFORMATION PROVIDED TO:
Patient Intermediate Repair Preamble Text (Leave Blank If You Do Not Want): Undermining was performed with blunt dissection.

## 2023-01-16 NOTE — ED PROVIDER NOTE - CPE EDP EYES NORM
Past Medical History:   Diagnosis Date    Anemia in ESRD (end-stage renal disease) 04/10/2013    Cellulitis of foot 2019    CHF (congestive heart failure)     Critical lower limb ischemia     Cysts of both ovaries 2018    Debility 2022    Diabetic ulcer of right heel associated with type 2 diabetes mellitus 2019    Diastolic dysfunction without heart failure     Encounter for blood transfusion     Gangrene of left foot 2019    Hyperlipidemia     Hypertension     Malignant hypertension with ESRD (end stage renal disease)     Morbid obesity with BMI of 45.0-49.9, adult 2017    Multiple thyroid nodules 2022    AIMEE (obstructive sleep apnea)     Osteomyelitis of left foot 2019    Pseudoaneurysm of arteriovenous dialysis fistula     Left arm    Pseudoaneurysm of arteriovenous dialysis fistula     Steal syndrome of dialysis vascular access 2018    Stroke     Thrombosis of arteriovenous graft 2019    Type 2 diabetes mellitus, uncontrolled, with renal complications        Past Surgical History:   Procedure Laterality Date    AMPUTATION      ANGIOGRAPHY OF LOWER EXTREMITY N/A 2019    Procedure: Angiogram Extremity bilateral;  Surgeon: Edward Quintana MD PhD;  Location: [facility] CATH LAB;  Service: Cardiology;  Laterality: N/A;    ANGIOGRAPHY OF LOWER EXTREMITY Right 2019    Procedure: Angiogram Extremity Unilateral, right;  Surgeon: Judd Galarza MD;  Location: [facility] CATH LAB;  Service: Peripheral Vascular;  Laterality: Right;    BELOW KNEE AMPUTATION OF LOWER EXTREMITY Right 2020    Procedure: AMPUTATION, BELOW KNEE;  Surgeon: Alena Solorio MD;  Location: [facility] OR;  Service: General;  Laterality: Right;     SECTION, CLASSIC      x2    CHOLECYSTECTOMY      DEBRIDEMENT OF LOWER EXTREMITY Right 10/10/2019    Procedure: DEBRIDEMENT, LOWER EXTREMITY;  Surgeon: Alena Solorio MD;  Location: [facility] OR;  Service: General;  Laterality: Right;     DEBRIDEMENT OF LOWER EXTREMITY Right 11/15/2019    Procedure: DEBRIDEMENT, LOWER EXTREMITY;  Surgeon: Alena Solorio MD;  Location: [facility] OR;  Service: General;  Laterality: Right;    DECLOTTING OF VASCULAR GRAFT Left 6/27/2019    Procedure: DECLOT-GRAFT;  Surgeon: Judd Galarza MD;  Location: [facility] CATH LAB;  Service: Peripheral Vascular;  Laterality: Left;    ESOPHAGOGASTRODUODENOSCOPY N/A 6/2/2022    Procedure: EGD (ESOPHAGOGASTRODUODENOSCOPY);  Surgeon: Emmanuel Valenzuela MD;  Location: [facility] ENDO;  Service: Endoscopy;  Laterality: N/A;    FISTULOGRAM N/A 7/10/2019    Procedure: Fistulogram;  Surgeon: Sohan Alvarado MD;  Location: [facility] CATH LAB/EP;  Service: Cardiology;  Laterality: N/A;    FOOT AMPUTATION THROUGH METATARSAL Left 2/26/2019    Procedure: AMPUTATION, FOOT, TRANSMETATARSAL;  Surgeon: Liliane Hyatt DPM;  Location: [facility] OR;  Service: Podiatry;  Laterality: Left;  4th and 5th partial ray amputatuion      FOOT AMPUTATION THROUGH METATARSAL Left 4/10/2019    Procedure: AMPUTATION, FOOT, TRANSMETATARSAL with wound vac application;  Surgeon: Liliane Hyatt DPM;  Location: [facility] OR;  Service: Podiatry;  Laterality: Left;  I am availiable at 11:30.   Thank you      FOOT AMPUTATION THROUGH METATARSAL Left 4/5/2019    Procedure: AMPUTATION, FOOT, TRANSMETATARSAL;  Surgeon: Liliane Hyatt DPM;  Location: [facility] OR;  Service: Podiatry;  Laterality: Left;    GASTRECTOMY      gastric sleeve      INCISION AND DRAINAGE OF WOUND      MECHANICAL THROMBOLYSIS Left 7/10/2019    Procedure: Thrombolysis - bypass graft;  Surgeon: Sohan Alvarado MD;  Location: [facility] CATH LAB/EP;  Service: Cardiology;  Laterality: Left;    PERCUTANEOUS TRANSLUMINAL ANGIOPLASTY (PTA) OF PERIPHERAL VESSEL Left 3/14/2019    Procedure: PTA, PERIPHERAL VESSEL;  Surgeon: Edward Quintana MD PhD;  Location: [facility] CATH LAB;  Service: Cardiology;  Laterality: Left;    PERCUTANEOUS TRANSLUMINAL ANGIOPLASTY (PTA) OF PERIPHERAL VESSEL Left  4/4/2019    Procedure: PTA, PERIPHERAL VESSEL;  Surgeon: Parish Renteria MD;  Location: [facility] CATH LAB/EP;  Service: Cardiology;  Laterality: Left;    PERCUTANEOUS TRANSLUMINAL ANGIOPLASTY OF ARTERIOVENOUS FISTULA N/A 7/10/2019    Procedure: PTA, AV FISTULA;  Surgeon: Sohan Alvarado MD;  Location: [facility] CATH LAB/EP;  Service: Cardiology;  Laterality: N/A;    THROMBECTOMY Left 8/19/2019    Procedure: THROMBECTOMY;  Surgeon: Alena Solorio MD;  Location: [facility] OR;  Service: General;  Laterality: Left;    TUBAL LIGATION  2010    VASCULAR SURGERY      fistula construction L upper arm       Review of patient's allergies indicates:  No Known Allergies    No current facility-administered medications on file prior to encounter.     Current Outpatient Medications on File Prior to Encounter   Medication Sig    apixaban (ELIQUIS) 5 mg Tab Take 1 tablet (5 mg total) by mouth 2 (two) times daily.    atorvastatin (LIPITOR) 40 MG tablet Take 1 tablet (40 mg total) by mouth once daily.    azelastine (ASTELIN) 137 mcg (0.1 %) nasal spray 1 spray (137 mcg total) by Nasal route 2 (two) times daily.    blood sugar diagnostic Strp 1 strip by Misc.(Non-Drug; Combo Route) route 2 (two) times daily.    blood-glucose meter (TRUE METRIX GLUCOSE METER) Misc 1 Device by Misc.(Non-Drug; Combo Route) route 2 (two) times daily.    carvediloL (COREG) 6.25 MG tablet Take 1 tablet (6.25 mg total) by mouth 2 (two) times daily with meals.    cinacalcet (SENSIPAR) 30 MG Tab Take 1 tablet (30 mg total) by mouth every evening.    FLUoxetine 20 MG capsule Take 1 capsule (20 mg total) by mouth once daily.    gabapentin (NEURONTIN) 100 MG capsule Take 1 capsule (100 mg total) by mouth once daily.    hydrALAZINE (APRESOLINE) 100 MG tablet Take 1 tablet (100 mg total) by mouth every 8 (eight) hours.    HYDROcodone-acetaminophen (NORCO) 5-325 mg per tablet Take 1 tablet by mouth every 24 hours as needed for Pain.    lancets 32 gauge Misc 1 lancet by  "Misc.(Non-Drug; Combo Route) route 2 (two) times a day.    losartan (COZAAR) 100 MG tablet Take 1 tablet (100 mg total) by mouth once daily.    pen needle, diabetic 32 gauge x 1/4" Ndle 1 lancet by Misc.(Non-Drug; Combo Route) route 2 (two) times daily.    sevelamer carbonate (RENVELA) 800 mg Tab TAKE 3 TABLETS BY MOUTH THREE TIMES DAILY WITH MEALS (Patient taking differently: Take 2,400 mg by mouth 3 (three) times daily with meals.)    traZODone (DESYREL) 100 MG tablet Take 1 tablet (100 mg total) by mouth nightly.    [DISCONTINUED] clopidogreL (PLAVIX) 75 mg tablet Take 1 tablet (75 mg total) by mouth once daily.    [DISCONTINUED] EScitalopram oxalate (LEXAPRO) 10 MG tablet Take 1 tablet (10 mg total) by mouth once daily.     Family History       Problem Relation (Age of Onset)    Breast cancer Mother    Colon cancer Maternal Grandfather    Heart disease Father    Ulcers Father          Tobacco Use    Smoking status: Never    Smokeless tobacco: Never   Substance and Sexual Activity    Alcohol use: No    Drug use: No    Sexual activity: Yes     Partners: Male     Birth control/protection: See Surgical Hx     Review of Systems   Constitutional:  Positive for fatigue and fever. Negative for chills and diaphoresis.   HENT:  Negative for congestion and sore throat.    Eyes:  Negative for discharge and visual disturbance.   Respiratory:  Positive for cough and shortness of breath.    Cardiovascular:  Negative for chest pain and leg swelling.   Gastrointestinal:  Negative for abdominal pain, nausea and vomiting.   Genitourinary:  Negative for difficulty urinating.   Musculoskeletal:  Negative for arthralgias and joint swelling.   Skin:  Negative for rash and wound.   Allergic/Immunologic: Negative for immunocompromised state.   Neurological:  Positive for weakness. Negative for light-headedness and headaches.   Psychiatric/Behavioral:  Negative for agitation and confusion.    Objective:     Vital Signs (Most " Recent):  Temp: 97.7 °F (36.5 °C) (01/16/23 1356)  Pulse: 87 (01/16/23 1649)  Resp: (!) 23 (01/16/23 1649)  BP: 126/64 (01/16/23 1649)  SpO2: 100 % (01/16/23 1649)   Vital Signs (24h Range):  Temp:  [97.7 °F (36.5 °C)] 97.7 °F (36.5 °C)  Pulse:  [82-87] 87  Resp:  [18-27] 23  SpO2:  [99 %-100 %] 100 %  BP: (126-228)/(64-92) 126/64     Weight: 134 kg (295 lb 6.7 oz)  Body mass index is 41.2 kg/m².    Physical Exam  Vitals reviewed.   Constitutional:       General: She is not in acute distress.     Appearance: She is well-developed. She is obese. She is not diaphoretic.   HENT:      Head: Normocephalic and atraumatic.      Nose: Nose normal.   Eyes:      General: No scleral icterus.     Pupils: Pupils are equal, round, and reactive to light.   Neck:      Vascular: No JVD.      Trachea: No tracheal deviation.   Cardiovascular:      Rate and Rhythm: Normal rate and regular rhythm.      Heart sounds: Normal heart sounds.   Pulmonary:      Effort: Pulmonary effort is normal. No respiratory distress.      Breath sounds: Normal breath sounds.   Abdominal:      General: There is no distension.      Palpations: Abdomen is soft.      Tenderness: There is no abdominal tenderness.   Musculoskeletal:         General: No deformity.      Cervical back: Normal range of motion.      Comments: Bilateral BKA   Skin:     General: Skin is warm and dry.      Findings: No rash.   Neurological:      Mental Status: She is alert and oriented to person, place, and time.   Psychiatric:         Behavior: Behavior normal.         CRANIAL NERVES     CN III, IV, VI   Pupils are equal, round, and reactive to light.     Significant Labs: All pertinent labs within the past 24 hours have been reviewed.    Significant Imaging: I have reviewed all pertinent imaging results/findings within the past 24 hours.   normal...

## 2023-03-10 NOTE — ED PROVIDER NOTE - DATE/TIME 1
Patient given Xarelto 20 mg, 2 bottles. Lot [id number] Exp 04/25   Patient also given updated patient assistance forms to fill out and return to office. Stephanie MANN LPN   03-Apr-2021 08:32

## 2023-04-19 NOTE — ED ADULT NURSE NOTE - MUSCULOSKELETAL WDL
Discussed during office visit yesterday Full range of motion of upper and lower extremities, no joint tenderness/swelling.

## 2023-05-08 NOTE — ED PROVIDER NOTE - MUSCULOSKELETAL, MLM
Spine appears normal, range of motion is not limited, no muscle or joint tenderness
[FreeTextEntry1] : I have reviewed the pertinent imaging, blood work, and pathology

## 2025-06-27 NOTE — ED ADULT NURSE NOTE - PT NEEDS ASSIST
Please have patient make an appointment with PCP as no more refills will be given until appointment.  
no